# Patient Record
Sex: MALE | Race: WHITE | NOT HISPANIC OR LATINO | Employment: OTHER | RURAL
[De-identification: names, ages, dates, MRNs, and addresses within clinical notes are randomized per-mention and may not be internally consistent; named-entity substitution may affect disease eponyms.]

---

## 2019-09-05 ENCOUNTER — OFFICE VISIT (OUTPATIENT)
Dept: FAMILY MEDICINE CLINIC | Facility: CLINIC | Age: 59
End: 2019-09-05

## 2019-09-05 VITALS
HEIGHT: 72 IN | BODY MASS INDEX: 27.32 KG/M2 | SYSTOLIC BLOOD PRESSURE: 118 MMHG | OXYGEN SATURATION: 99 % | WEIGHT: 201.7 LBS | TEMPERATURE: 98 F | DIASTOLIC BLOOD PRESSURE: 60 MMHG | HEART RATE: 53 BPM

## 2019-09-05 DIAGNOSIS — E78.5 HYPERLIPIDEMIA, UNSPECIFIED HYPERLIPIDEMIA TYPE: ICD-10-CM

## 2019-09-05 DIAGNOSIS — Z87.19 HISTORY OF INGUINAL HERNIA: ICD-10-CM

## 2019-09-05 DIAGNOSIS — I10 ESSENTIAL HYPERTENSION: ICD-10-CM

## 2019-09-05 DIAGNOSIS — Z76.89 ENCOUNTER TO ESTABLISH CARE WITH NEW DOCTOR: ICD-10-CM

## 2019-09-05 DIAGNOSIS — Z90.79 H/O PROSTATECTOMY: ICD-10-CM

## 2019-09-05 DIAGNOSIS — C61 PROSTATE CANCER (HCC): ICD-10-CM

## 2019-09-05 DIAGNOSIS — G47.00 INSOMNIA, UNSPECIFIED TYPE: ICD-10-CM

## 2019-09-05 PROCEDURE — 99204 OFFICE O/P NEW MOD 45 MIN: CPT | Performed by: FAMILY MEDICINE

## 2019-09-05 RX ORDER — GABAPENTIN 300 MG/1
300 CAPSULE ORAL
Refills: 5 | COMMUNITY
Start: 2019-08-19 | End: 2019-10-30 | Stop reason: SDUPTHER

## 2019-09-05 RX ORDER — IPRATROPIUM BROMIDE 21 UG/1
SPRAY, METERED NASAL
Refills: 4 | COMMUNITY
Start: 2019-07-16 | End: 2020-04-15 | Stop reason: SDUPTHER

## 2019-09-05 RX ORDER — RAMIPRIL 10 MG/1
CAPSULE ORAL
COMMUNITY
Start: 2018-06-29 | End: 2019-10-30 | Stop reason: SDUPTHER

## 2019-09-05 RX ORDER — ATORVASTATIN CALCIUM 20 MG/1
1 TABLET, FILM COATED ORAL EVERY OTHER DAY
COMMUNITY
Start: 2018-06-29 | End: 2019-10-30 | Stop reason: SDUPTHER

## 2019-09-05 RX ORDER — MONTELUKAST SODIUM 10 MG/1
TABLET ORAL
COMMUNITY
Start: 2018-06-29 | End: 2019-10-30 | Stop reason: SDUPTHER

## 2019-09-05 NOTE — PROGRESS NOTES
Subjective Pt of Dr. Loyd. Practice has closed.    Guevara Meza is a 59 y.o. overweight white male nonsmoker with HTN, Allergies, [employer] (Hearing aides), H/O Prostate cancer S/P Prostatectomy at [facility]. See PMH/PSH. Presents to [facility] with new PCP.  ' Need Checkup, management of problems as discussed'.    Hypertension   This is a chronic problem. The current episode started more than 1 year ago. The problem is controlled. Associated symptoms include headaches. Pertinent negatives include no chest pain, neck pain, palpitations or shortness of breath (Cancer). Agents associated with hypertension include decongestants and NSAIDs. Risk factors for coronary artery disease include male gender and dyslipidemia. Past treatments include ACE inhibitors. Current antihypertension treatment includes ACE inhibitors. The current treatment provides significant improvement.   Cancer   This is a chronic (prostate cancer, S/P Prostatectomy) problem. The current episode started more than 1 year ago. The problem occurs daily. The problem has been gradually improving. Associated symptoms include arthralgias, fatigue and headaches. Pertinent negatives include no abdominal pain, chest pain, chills, congestion, coughing, diaphoresis, fever, joint swelling, myalgias, nausea, neck pain, numbness, rash, sore throat, vomiting or weakness. Nothing aggravates the symptoms. Treatments tried: Surgery. The treatment provided significant relief.   Sinus Problem   This is a chronic (Allergies, Rhinorrhea.) problem. The current episode started more than 1 year ago. The problem has been waxing and waning since onset. His pain is at a severity of 0/10. He is experiencing no pain. Associated symptoms include headaches. Pertinent negatives include no chills, congestion, coughing, diaphoresis, ear pain, neck pain, shortness of breath (Cancer), sinus pressure, sneezing or sore throat. Past treatments include spray decongestants and saline sprays  (Singulair, Benadryl, Atrovent nasal.). The treatment provided moderate relief.   Hyperlipidemia   This is a chronic problem. The current episode started more than 1 year ago. Recent lipid tests were reviewed and are variable. Pertinent negatives include no chest pain, myalgias or shortness of breath (Cancer). Current antihyperlipidemic treatment includes statins. The current treatment provides significant improvement of lipids. Risk factors for coronary artery disease include dyslipidemia, hypertension and male sex.   Pain   This is a chronic (Foot, Back, Shoulder) problem. Associated symptoms include arthralgias, fatigue and headaches. Pertinent negatives include no abdominal pain, chest pain, chills, congestion, coughing, diaphoresis, fever, joint swelling, myalgias, nausea, neck pain, numbness, rash, sore throat, vomiting or weakness. The symptoms are aggravated by standing, walking and exertion. Treatments tried: Neurontin. The treatment provided moderate relief.   Insomnia   This is a chronic problem. The current episode started more than 1 year ago. The problem occurs intermittently. Associated symptoms include arthralgias, fatigue and headaches. Pertinent negatives include no abdominal pain, chest pain, chills, congestion, coughing, diaphoresis, fever, joint swelling, myalgias, nausea, neck pain, numbness, rash, sore throat, vomiting or weakness. Treatments tried: Benadryl, Neurontin. The treatment provided moderate relief.        The following portions of the patient's history were reviewed and updated as appropriate: allergies, current medications, past family history, past medical history, past social history, past surgical history and problem list.    Review of Systems   Constitutional: Positive for fatigue. Negative for activity change, appetite change, chills, diaphoresis, fever and unexpected weight change.   HENT: Positive for hearing loss, postnasal drip, rhinorrhea and tinnitus. Negative for  congestion, dental problem, drooling, ear discharge, ear pain, facial swelling, mouth sores, nosebleeds, sinus pressure, sinus pain, sneezing, sore throat, trouble swallowing and voice change.    Eyes: Positive for itching. Negative for photophobia, pain, discharge, redness and visual disturbance.        Wears glasses     Eye exams with Dr. Glaser   Respiratory: Negative for apnea, cough, choking, chest tightness, shortness of breath (Cancer), wheezing and stridor.    Cardiovascular: Negative for chest pain, palpitations and leg swelling.   Gastrointestinal: Negative for abdominal distention, abdominal pain, anal bleeding, blood in stool, constipation, diarrhea, nausea, rectal pain and vomiting.        Rectal stenosis   Endocrine: Negative for cold intolerance, heat intolerance, polydipsia, polyphagia and polyuria.   Genitourinary: Positive for enuresis and hematuria. Negative for decreased urine volume, difficulty urinating, discharge, dysuria, flank pain, frequency, genital sores, penile pain, penile swelling, scrotal swelling, testicular pain and urgency.        Small R hernia felt in scrotum at times, told OK    Musculoskeletal: Positive for arthralgias and back pain. Negative for gait problem, joint swelling, myalgias, neck pain and neck stiffness.        R foot, Plantar faciitis.  R calf pain.     L5 DDD  Occasional back pain more on R    R shoulder pain   Skin: Negative for color change, pallor, rash and wound.        Eczema   Allergic/Immunologic: Positive for environmental allergies. Negative for food allergies and immunocompromised state.   Neurological: Positive for tremors and headaches. Negative for dizziness, seizures, syncope, facial asymmetry, speech difficulty, weakness, light-headedness and numbness.   Hematological: Negative for adenopathy. Bruises/bleeds easily.   Psychiatric/Behavioral: Positive for sleep disturbance. Negative for agitation, behavioral problems, confusion, decreased  concentration, dysphoric mood, hallucinations, self-injury and suicidal ideas. The patient has insomnia. The patient is not nervous/anxious and is not hyperactive.         Neurotin helps with sleep,      Sleeps 5 hrs with med.        Objective   Physical Exam   Constitutional: He is oriented to person, place, and time. He appears well-developed and well-nourished. No distress.   HENT:   Head: Normocephalic.   Right Ear: External ear normal.   Left Ear: External ear normal.   Nose: Nose normal.   Mouth/Throat: Oropharynx is clear and moist. No oropharyngeal exudate.   Eyes: Conjunctivae and EOM are normal. Pupils are equal, round, and reactive to light. Right eye exhibits no discharge. Left eye exhibits no discharge. No scleral icterus.   Neck: Normal range of motion. Neck supple. No JVD present. No tracheal deviation present. No thyromegaly present.   Cardiovascular: Normal rate, normal heart sounds and intact distal pulses. Exam reveals no gallop and no friction rub.   No murmur heard.  Pulmonary/Chest: Effort normal and breath sounds normal. No stridor. No respiratory distress. He has no wheezes. He has no rales. He exhibits no tenderness.   Abdominal: Soft. Bowel sounds are normal. He exhibits no distension and no mass. There is no tenderness. There is no rebound and no guarding. No hernia.   Musculoskeletal: Normal range of motion. He exhibits no edema, tenderness or deformity.   R shoulder pain   Lymphadenopathy:     He has no cervical adenopathy.   Neurological: He is alert and oriented to person, place, and time. He has normal reflexes. He displays normal reflexes. No cranial nerve deficit or sensory deficit. He exhibits normal muscle tone. Coordination normal.   Skin: Skin is warm and dry. Capillary refill takes 2 to 3 seconds. No rash noted. He is not diaphoretic. No erythema. No pallor.   Psychiatric: He has a normal mood and affect. His behavior is normal. Judgment and thought content normal.   Nursing  note and vitals reviewed.      Assessment/Plan   Guevara was seen today for establish care.    Diagnoses and all orders for this visit:    Encounter to establish care with new doctor    Prostate cancer (CMS/[facility])    H/O prostatectomy    Essential hypertension    Hyperlipidemia, unspecified hyperlipidemia type    History of inguinal hernia    Insomnia, unspecified type    Discussed exam, health problems, meds, tx plan, rationale. Discussed USPSTF recommendations. Discussed F/U with specialist. Discussed F/U here.          This document has been electronically signed by Rd Pabon MD

## 2019-09-08 PROBLEM — I10 ESSENTIAL HYPERTENSION: Status: ACTIVE | Noted: 2019-09-08

## 2019-09-08 PROBLEM — Z87.19 HISTORY OF INGUINAL HERNIA: Status: ACTIVE | Noted: 2019-09-08

## 2019-09-08 PROBLEM — E78.5 HYPERLIPIDEMIA: Status: ACTIVE | Noted: 2019-09-08

## 2019-09-08 PROBLEM — Z90.79 H/O PROSTATECTOMY: Status: ACTIVE | Noted: 2019-09-08

## 2019-09-08 PROBLEM — Z76.89 ENCOUNTER TO ESTABLISH CARE WITH NEW DOCTOR: Status: ACTIVE | Noted: 2019-09-08

## 2019-09-08 PROBLEM — C61 PROSTATE CANCER: Status: ACTIVE | Noted: 2019-09-08

## 2019-10-16 ENCOUNTER — LAB (OUTPATIENT)
Dept: LAB | Facility: HOSPITAL | Age: 59
End: 2019-10-16

## 2019-10-16 ENCOUNTER — OFFICE VISIT (OUTPATIENT)
Dept: FAMILY MEDICINE CLINIC | Facility: CLINIC | Age: 59
End: 2019-10-16

## 2019-10-16 VITALS
OXYGEN SATURATION: 98 % | HEIGHT: 72 IN | DIASTOLIC BLOOD PRESSURE: 96 MMHG | TEMPERATURE: 97.9 F | RESPIRATION RATE: 18 BRPM | SYSTOLIC BLOOD PRESSURE: 158 MMHG | HEART RATE: 53 BPM | WEIGHT: 205.25 LBS | BODY MASS INDEX: 27.8 KG/M2

## 2019-10-16 DIAGNOSIS — R25.2 LEG CRAMPS: ICD-10-CM

## 2019-10-16 DIAGNOSIS — M79.604 RIGHT LEG PAIN: Primary | ICD-10-CM

## 2019-10-16 DIAGNOSIS — Z79.899 HIGH RISK MEDICATION USE: ICD-10-CM

## 2019-10-16 DIAGNOSIS — I10 ESSENTIAL HYPERTENSION: ICD-10-CM

## 2019-10-16 DIAGNOSIS — I83.93 VARICOSE VEINS OF BOTH LOWER EXTREMITIES, UNSPECIFIED WHETHER COMPLICATED: ICD-10-CM

## 2019-10-16 DIAGNOSIS — Z00.00 ROUTINE MEDICAL EXAM: ICD-10-CM

## 2019-10-16 PROCEDURE — 80048 BASIC METABOLIC PNL TOTAL CA: CPT | Performed by: NURSE PRACTITIONER

## 2019-10-16 PROCEDURE — 85379 FIBRIN DEGRADATION QUANT: CPT | Performed by: NURSE PRACTITIONER

## 2019-10-16 PROCEDURE — 83735 ASSAY OF MAGNESIUM: CPT | Performed by: NURSE PRACTITIONER

## 2019-10-16 PROCEDURE — 99213 OFFICE O/P EST LOW 20 MIN: CPT | Performed by: NURSE PRACTITIONER

## 2019-10-16 PROCEDURE — 85025 COMPLETE CBC W/AUTO DIFF WBC: CPT | Performed by: NURSE PRACTITIONER

## 2019-10-16 NOTE — PROGRESS NOTES
"Subjective   Guevara Meza is a 59 y.o. male.     [facility] Visit    PCP: Rd Pabon MD    CC: \"right leg pain, cramps\"    History of varicose veins in bilateral lower legs, has support socks, but hasn't been wearing regularly.  Denies history of blood clots, recent travel.  Non smoker.     Does report he took a cialis and some cold meds yesterday and blood pressure is up a little today.  Has not taken Altace yet today.      Is on a statin for hyperlipidemia, but has been on for awhile without symptoms of leg cramps and has had no recent increase in dosage.       Leg Pain    Incident onset: started with right foot pain/cramping about a week ago, but the last few days it feels like it is traveling up his leg and now is in his right thigh--he is concerned about a blood clot. The incident occurred at home. There was no injury mechanism. The pain is present in the right foot and right leg. The quality of the pain is described as cramping. The pain is moderate. The pain has been fluctuating since onset. Pertinent negatives include no inability to bear weight, loss of motion, loss of sensation, muscle weakness, numbness or tingling. Nothing aggravates the symptoms. Treatments tried: he thought it may be from his sciatica, sees chiropractor for this, but pain feels different.        The following portions of the patient's history were reviewed and updated as appropriate: allergies, current medications, past medical history, past social history, past surgical history and problem list.    Review of Systems   Constitutional: Negative.    HENT: Positive for congestion. Negative for ear discharge, ear pain, postnasal drip, rhinorrhea, sinus pressure, sneezing and swollen glands.    Eyes: Negative.    Respiratory: Negative.    Cardiovascular: Negative.         Varicosities lower legs     Gastrointestinal: Negative.    Genitourinary: Positive for urinary incontinence ( since prostatectomy in Feb 2019). " "  Musculoskeletal: Positive for back pain.   Skin: Negative for rash.   Neurological: Negative for dizziness, tingling, numbness and headache.     /96 (BP Location: Right arm, Patient Position: Sitting, Cuff Size: Large Adult)   Pulse 53   Temp 97.9 °F (36.6 °C) (Oral)   Resp 18   Ht 182.9 cm (72\")   Wt 93.1 kg (205 lb 4 oz)   SpO2 98%   BMI 27.84 kg/m²     Objective   Physical Exam   Constitutional: He is oriented to person, place, and time. He appears well-developed and well-nourished. No distress.   Cardiovascular: Normal rate and regular rhythm.   Pulmonary/Chest: Effort normal and breath sounds normal.   Musculoskeletal:        Right lower leg: He exhibits no tenderness, no bony tenderness, no swelling and no edema.        Legs:  Negative Hansel's sign     Neurological: He is alert and oriented to person, place, and time.   Skin: Skin is warm and dry. No erythema.   Nursing note and vitals reviewed.    No results found for this or any previous visit (from the past 24 hour(s)).  No Images in the past 120 days found..      Assessment/Plan   Guevara was seen today for leg pain.    Diagnoses and all orders for this visit:    Right leg pain  -     CBC Auto Differential  -     D-dimer, Quantitative  -     Duplex Venous Lower Extremity - Right CAR; Future    Leg cramps  -     Basic metabolic panel  -     Magnesium  -     CBC Auto Differential  -     D-dimer, Quantitative    Varicose veins of both lower extremities, unspecified whether complicated  -     CBC Auto Differential  -     D-dimer, Quantitative  -     Duplex Venous Lower Extremity - Right CAR; Future    Essential hypertension      Labs, US as above.  Will follow with results when available.   Recommended he resume support socks/stockings for now    Take b/p meds as directed daily. Avoid OTC cold meds.  May use Coricidan OTC if needed.     See PCP or RTC if symptoms persist/worsen  See PCP for routine f/u visit and management of chronic medical " conditions

## 2019-10-17 LAB
ANION GAP SERPL CALCULATED.3IONS-SCNC: 8.4 MMOL/L (ref 5–15)
BASOPHILS # BLD AUTO: 0.06 10*3/MM3 (ref 0–0.2)
BASOPHILS NFR BLD AUTO: 1.1 % (ref 0–1.5)
BUN BLD-MCNC: 12 MG/DL (ref 8–23)
BUN/CREAT SERPL: 12.4 (ref 7–25)
CALCIUM SPEC-SCNC: 9.1 MG/DL (ref 8.6–10.5)
CHLORIDE SERPL-SCNC: 102 MMOL/L (ref 98–107)
CO2 SERPL-SCNC: 31.6 MMOL/L (ref 22–29)
CREAT BLD-MCNC: 0.97 MG/DL (ref 0.76–1.27)
D-DIMER, QUANTITATIVE (MAD,POW, STR): 323 NG/ML (FEU) (ref 0–470)
DEPRECATED RDW RBC AUTO: 40.7 FL (ref 37–54)
EOSINOPHIL # BLD AUTO: 0.19 10*3/MM3 (ref 0–0.4)
EOSINOPHIL NFR BLD AUTO: 3.5 % (ref 0.3–6.2)
ERYTHROCYTE [DISTWIDTH] IN BLOOD BY AUTOMATED COUNT: 12.4 % (ref 12.3–15.4)
GFR SERPL CREATININE-BSD FRML MDRD: 79 ML/MIN/1.73
GLUCOSE BLD-MCNC: 143 MG/DL (ref 65–99)
HCT VFR BLD AUTO: 40.1 % (ref 37.5–51)
HGB BLD-MCNC: 13.6 G/DL (ref 13–17.7)
IMM GRANULOCYTES # BLD AUTO: 0.13 10*3/MM3 (ref 0–0.05)
IMM GRANULOCYTES NFR BLD AUTO: 2.4 % (ref 0–0.5)
LYMPHOCYTES # BLD AUTO: 0.89 10*3/MM3 (ref 0.7–3.1)
LYMPHOCYTES NFR BLD AUTO: 16.2 % (ref 19.6–45.3)
MAGNESIUM SERPL-MCNC: 2.4 MG/DL (ref 1.6–2.4)
MCH RBC QN AUTO: 31 PG (ref 26.6–33)
MCHC RBC AUTO-ENTMCNC: 33.9 G/DL (ref 31.5–35.7)
MCV RBC AUTO: 91.3 FL (ref 79–97)
MONOCYTES # BLD AUTO: 0.46 10*3/MM3 (ref 0.1–0.9)
MONOCYTES NFR BLD AUTO: 8.4 % (ref 5–12)
NEUTROPHILS # BLD AUTO: 3.76 10*3/MM3 (ref 1.7–7)
NEUTROPHILS NFR BLD AUTO: 68.4 % (ref 42.7–76)
NRBC BLD AUTO-RTO: 0 /100 WBC (ref 0–0.2)
PLATELET # BLD AUTO: 189 10*3/MM3 (ref 140–450)
PMV BLD AUTO: 11.3 FL (ref 6–12)
POTASSIUM BLD-SCNC: 4.2 MMOL/L (ref 3.5–5.2)
RBC # BLD AUTO: 4.39 10*6/MM3 (ref 4.14–5.8)
SODIUM BLD-SCNC: 142 MMOL/L (ref 136–145)
WBC NRBC COR # BLD: 5.49 10*3/MM3 (ref 3.4–10.8)

## 2019-10-18 ENCOUNTER — OFFICE VISIT (OUTPATIENT)
Dept: FAMILY MEDICINE CLINIC | Facility: CLINIC | Age: 59
End: 2019-10-18

## 2019-10-18 VITALS
TEMPERATURE: 98.6 F | BODY MASS INDEX: 28.31 KG/M2 | OXYGEN SATURATION: 98 % | HEIGHT: 72 IN | DIASTOLIC BLOOD PRESSURE: 88 MMHG | SYSTOLIC BLOOD PRESSURE: 144 MMHG | WEIGHT: 209 LBS | HEART RATE: 59 BPM

## 2019-10-18 DIAGNOSIS — Z90.79 H/O PROSTATECTOMY: ICD-10-CM

## 2019-10-18 DIAGNOSIS — Z00.00 ROUTINE MEDICAL EXAM: ICD-10-CM

## 2019-10-18 DIAGNOSIS — Z79.899 HIGH RISK MEDICATION USE: ICD-10-CM

## 2019-10-18 DIAGNOSIS — I10 ESSENTIAL HYPERTENSION: ICD-10-CM

## 2019-10-18 DIAGNOSIS — Z87.19 HISTORY OF INGUINAL HERNIA: ICD-10-CM

## 2019-10-18 DIAGNOSIS — M54.31 SCIATICA OF RIGHT SIDE: ICD-10-CM

## 2019-10-18 DIAGNOSIS — I83.93 VARICOSE VEINS OF BOTH LOWER EXTREMITIES, UNSPECIFIED WHETHER COMPLICATED: ICD-10-CM

## 2019-10-18 DIAGNOSIS — R25.2 LEG CRAMPS: ICD-10-CM

## 2019-10-18 LAB
AMPHET+METHAMPHET UR QL: NEGATIVE
AMPHETAMINES UR QL: NEGATIVE
BARBITURATES UR QL SCN: NEGATIVE
BENZODIAZ UR QL SCN: NEGATIVE
BILIRUB UR QL STRIP: NEGATIVE
BUPRENORPHINE SERPL-MCNC: NEGATIVE NG/ML
CANNABINOIDS SERPL QL: NEGATIVE
CHOLEST SERPL-MCNC: 144 MG/DL (ref 0–200)
CLARITY UR: CLEAR
COCAINE UR QL: NEGATIVE
COLOR UR: YELLOW
GLUCOSE UR STRIP-MCNC: NEGATIVE MG/DL
HDLC SERPL-MCNC: 53 MG/DL (ref 40–60)
HGB UR QL STRIP.AUTO: NEGATIVE
KETONES UR QL STRIP: NEGATIVE
LDLC SERPL CALC-MCNC: 82 MG/DL (ref 0–100)
LDLC/HDLC SERPL: 1.54 {RATIO}
LEUKOCYTE ESTERASE UR QL STRIP.AUTO: NEGATIVE
METHADONE UR QL SCN: NEGATIVE
NITRITE UR QL STRIP: NEGATIVE
OPIATES UR QL: NEGATIVE
OXYCODONE UR QL SCN: NEGATIVE
PCP UR QL SCN: NEGATIVE
PH UR STRIP.AUTO: 7 [PH] (ref 5–8)
PROPOXYPH UR QL: NEGATIVE
PROT UR QL STRIP: NEGATIVE
SP GR UR STRIP: 1.01 (ref 1–1.03)
TRICYCLICS UR QL SCN: NEGATIVE
TRIGL SERPL-MCNC: 46 MG/DL (ref 0–150)
TSH SERPL DL<=0.05 MIU/L-ACNC: 3.27 UIU/ML (ref 0.27–4.2)
UROBILINOGEN UR QL STRIP: NORMAL
VLDLC SERPL-MCNC: 9.2 MG/DL (ref 5–40)

## 2019-10-18 PROCEDURE — 80307 DRUG TEST PRSMV CHEM ANLYZR: CPT

## 2019-10-18 PROCEDURE — 80061 LIPID PANEL: CPT

## 2019-10-18 PROCEDURE — 84443 ASSAY THYROID STIM HORMONE: CPT

## 2019-10-18 PROCEDURE — 82672 ASSAY OF ESTROGEN: CPT

## 2019-10-18 PROCEDURE — 99213 OFFICE O/P EST LOW 20 MIN: CPT | Performed by: FAMILY MEDICINE

## 2019-10-18 PROCEDURE — 81003 URINALYSIS AUTO W/O SCOPE: CPT

## 2019-10-18 NOTE — PROGRESS NOTES
Subjective   ohn Maria E Meza is a 59 y.o. overweight white male nonsmoker with HTN, Allergies, [employer] (Hearing aides), H/O Prostate cancer S/P Prostatectomy at [place]. See PMH/PSH. Presents for exam, to discuss health problems, Tx and F/U plans.    '  Remains incontinent of urine after Prostatectomy, using absorbant UW, smells like ammonia  after a few hrs. ED unresolved since surgery, was given Cialis not effective, trying a pump. Back pain has flared up, going down R leg. B/p has been OK. Allergies tolerable on meds'      Hypertension   This is a chronic problem. The current episode started more than 1 year ago. The problem is controlled. Associated symptoms include headaches. Pertinent negatives include no chest pain, neck pain, palpitations or shortness of breath (Cancer). Agents associated with hypertension include decongestants and NSAIDs. Risk factors for coronary artery disease include male gender and dyslipidemia. Past treatments include ACE inhibitors. Current antihypertension treatment includes ACE inhibitors. The current treatment provides significant improvement.   Cancer   This is a chronic (prostate cancer, S/P Prostatectomy) problem. The current episode started more than 1 year ago. The problem occurs daily. The problem has been gradually improving. Associated symptoms include arthralgias, fatigue and headaches. Pertinent negatives include no abdominal pain, chest pain, chills, congestion, coughing, diaphoresis, fever, joint swelling, myalgias, nausea, neck pain, numbness, rash, sore throat, vomiting or weakness. Nothing aggravates the symptoms. Treatments tried: Surgery. The treatment provided significant relief.   Sinus Problem   This is a chronic (Allergies, Rhinorrhea.) problem. The current episode started more than 1 year ago. The problem has been waxing and waning since onset. His pain is at a severity of 0/10. He is experiencing no pain. Associated symptoms include headaches. Pertinent  negatives include no chills, congestion, coughing, diaphoresis, ear pain, neck pain, shortness of breath (Cancer), sinus pressure, sneezing or sore throat. Past treatments include spray decongestants and saline sprays (Singulair, Benadryl, Atrovent nasal.). The treatment provided moderate relief.   Hyperlipidemia   This is a chronic problem. The current episode started more than 1 year ago. Recent lipid tests were reviewed and are variable. Pertinent negatives include no chest pain, myalgias or shortness of breath (Cancer). Current antihyperlipidemic treatment includes statins. The current treatment provides significant improvement of lipids. Risk factors for coronary artery disease include dyslipidemia, hypertension and male sex.   Pain   This is a chronic (Foot, Back, Shoulder pain. Low back is flaring) problem. The current episode started more than 1 year ago. Associated symptoms include arthralgias, fatigue and headaches. Pertinent negatives include no abdominal pain, chest pain, chills, congestion, coughing, diaphoresis, fever, joint swelling, myalgias, nausea, neck pain, numbness, rash, sore throat, vomiting or weakness. The symptoms are aggravated by standing, walking and exertion. Treatments tried: Neurontin. The treatment provided moderate relief.   Insomnia   This is a chronic problem. The current episode started more than 1 year ago. The problem occurs intermittently. Associated symptoms include arthralgias, fatigue and headaches. Pertinent negatives include no abdominal pain, chest pain, chills, congestion, coughing, diaphoresis, fever, joint swelling, myalgias, nausea, neck pain, numbness, rash, sore throat, vomiting or weakness. Treatments tried: Benadryl, Neurontin. The treatment provided moderate relief.        The following portions of the patient's history were reviewed and updated as appropriate: allergies, current medications, past family history, past medical history, past social history, past  surgical history and problem list.    Review of Systems   Constitutional: Positive for fatigue. Negative for activity change, appetite change, chills, diaphoresis, fever and unexpected weight change.   HENT: Positive for hearing loss, postnasal drip, rhinorrhea and tinnitus. Negative for congestion, dental problem, drooling, ear discharge, ear pain, facial swelling, mouth sores, nosebleeds, sinus pressure, sinus pain, sneezing, sore throat, trouble swallowing and voice change.    Eyes: Positive for itching. Negative for photophobia, pain, discharge, redness and visual disturbance.        Wears glasses     Eye exams with Dr. Glaser   Respiratory: Negative for apnea, cough, choking, chest tightness, shortness of breath (Cancer), wheezing and stridor.    Cardiovascular: Negative for chest pain, palpitations and leg swelling.   Gastrointestinal: Negative for abdominal distention, abdominal pain, anal bleeding, blood in stool, constipation, diarrhea, nausea, rectal pain and vomiting.        Rectal stenosis   Endocrine: Negative for cold intolerance, heat intolerance, polydipsia, polyphagia and polyuria.   Genitourinary: Positive for enuresis and hematuria. Negative for decreased urine volume, difficulty urinating, discharge, dysuria, flank pain, frequency, genital sores, penile pain, penile swelling, scrotal swelling, testicular pain and urgency.        Small R hernia felt in scrotum at times, told OK    Musculoskeletal: Positive for arthralgias and back pain. Negative for gait problem, joint swelling, myalgias, neck pain and neck stiffness.        R foot, Plantar faciitis.  R calf pain.     L5 DDD  Occasional back pain more on R    R shoulder pain   Skin: Negative for color change, pallor, rash and wound.        Eczema   Allergic/Immunologic: Positive for environmental allergies. Negative for food allergies and immunocompromised state.   Neurological: Positive for tremors and headaches. Negative for dizziness, seizures,  syncope, facial asymmetry, speech difficulty, weakness, light-headedness and numbness.   Hematological: Negative for adenopathy. Bruises/bleeds easily.   Psychiatric/Behavioral: Positive for sleep disturbance. Negative for agitation, behavioral problems, confusion, decreased concentration, dysphoric mood, hallucinations, self-injury and suicidal ideas. The patient has insomnia. The patient is not nervous/anxious and is not hyperactive.         Neurotin helps with sleep,      Sleeps 5 hrs with med.        Objective   Physical Exam   Constitutional: He is oriented to person, place, and time. He appears well-developed and well-nourished. No distress.   HENT:   Head: Normocephalic.   Right Ear: External ear normal.   Left Ear: External ear normal.   Nose: Nose normal.   Mouth/Throat: Oropharynx is clear and moist. No oropharyngeal exudate.   Eyes: Conjunctivae and EOM are normal. Pupils are equal, round, and reactive to light. Right eye exhibits no discharge. Left eye exhibits no discharge. No scleral icterus.   Neck: Normal range of motion. Neck supple. No JVD present. No tracheal deviation present. No thyromegaly present.   Cardiovascular: Normal rate, normal heart sounds and intact distal pulses. Exam reveals no gallop and no friction rub.   No murmur heard.  Pulmonary/Chest: Effort normal and breath sounds normal. No stridor. No respiratory distress. He has no wheezes. He has no rales. He exhibits no tenderness.   Abdominal: Soft. Bowel sounds are normal. He exhibits no distension and no mass. There is no tenderness. There is no rebound and no guarding. No hernia.   Musculoskeletal: Normal range of motion. He exhibits no edema, tenderness or deformity.   R shoulder pain.  Has WB 4 with Sav SL and C/O   Reports pain goes R low back down R leg.    Lymphadenopathy:     He has no cervical adenopathy.   Neurological: He is alert and oriented to person, place, and time. He has normal reflexes. He displays normal reflexes.  No cranial nerve deficit or sensory deficit. He exhibits normal muscle tone. Coordination normal.   Skin: Skin is warm and dry. Capillary refill takes 2 to 3 seconds. No rash noted. He is not diaphoretic. No erythema. No pallor.   Psychiatric: He has a normal mood and affect. His behavior is normal. Judgment and thought content normal.   Nursing note and vitals reviewed.      Assessment/Plan   Guevara was seen today for hypertension, hyperlipidemia, insomnia, leg pain and muscle pain.    Diagnoses and all orders for this visit:    Sciatica of right side  -     XR Spine Lumbar 2 or 3 View (In Office)    High risk medication use  -     Urine Drug Screen - Urine, Clean Catch; Future    Routine medical exam  -     TSH; Future  -     Lipid Panel; Future  -     Urinalysis With Culture If Indicated -; Future  -     Estrogens, Total; Future    Essential hypertension    H/O prostatectomy    History of inguinal hernia    Leg cramps    Varicose veins of both lower extremities, unspecified whether complicated      Discussed exam, health problems, meds, indications, Tx plan, rationale. Discussed USPSTF recommendations. Discussed checking labs. Discussed F/U with specialist. Discussed F/U here.          This document has been electronically signed by Rd Pabon MD

## 2019-10-18 NOTE — PATIENT INSTRUCTIONS
Chronic Back Pain  When back pain lasts longer than 3 months, it is called chronic back pain. The cause of your back pain may not be known. Some common causes include:  · Wear and tear (degenerative disease) of the bones, ligaments, or disks in your back.  · Inflammation and stiffness in your back (arthritis).  People who have chronic back pain often go through certain periods in which the pain is more intense (flare-ups). Many people can learn to manage the pain with home care.  Follow these instructions at home:  Pay attention to any changes in your symptoms. Take these actions to help with your pain:  Activity    · Avoid bending and other activities that make the problem worse.  · Maintain a proper position when standing or sitting:  ? When standing, keep your upper back and neck straight, with your shoulders pulled back. Avoid slouching.  ? When sitting, keep your back straight and relax your shoulders. Do not round your shoulders or pull them backward.  · Do not sit or  one place for long periods of time.  · Take brief periods of rest throughout the day. This will reduce your pain. Resting in a lying or standing position is usually better than sitting to rest.  · When you are resting for longer periods, mix in some mild activity or stretching between periods of rest. This will help to prevent stiffness and pain.  · Get regular exercise. Ask your health care provider what activities are safe for you.  · Do not lift anything that is heavier than 10 lb (4.5 kg). Always use proper lifting technique, which includes:  ? Bending your knees.  ? Keeping the load close to your body.  ? Avoiding twisting.  · Sleep on a firm mattress in a comfortable position. Try lying on your side with your knees slightly bent. If you lie on your back, put a pillow under your knees.  Managing pain  · If directed, apply ice to the painful area. Your health care provider may recommend applying ice during the first 24-48 hours after  a flare-up begins.  ? Put ice in a plastic bag.  ? Place a towel between your skin and the bag.  ? Leave the ice on for 20 minutes, 2-3 times per day.  · If directed, apply heat to the affected area as often as told by your health care provider. Use the heat source that your health care provider recommends, such as a moist heat pack or a heating pad.  ? Place a towel between your skin and the heat source.  ? Leave the heat on for 20-30 minutes.  ? Remove the heat if your skin turns bright red. This is especially important if you are unable to feel pain, heat, or cold. You may have a greater risk of getting burned.  · Try soaking in a warm tub.  · Take over-the-counter and prescription medicines only as told by your health care provider.  · Keep all follow-up visits as told by your health care provider. This is important.  Contact a health care provider if:  · You have pain that is not relieved with rest or medicine.  Get help right away if:  · You have weakness or numbness in one or both of your legs or feet.  · You have trouble controlling your bladder or your bowels.  · You have nausea or vomiting.  · You have pain in your abdomen.  · You have shortness of breath or you faint.  This information is not intended to replace advice given to you by your health care provider. Make sure you discuss any questions you have with your health care provider.  Document Released: 01/25/2006 Document Revised: 06/27/2018 Document Reviewed: 06/27/2018  [employer] Interactive Interactive Patient Education © 2019 [employer]

## 2019-10-21 LAB — ESTROGEN SERPL-MCNC: 114 PG/ML (ref 40–115)

## 2019-10-22 ENCOUNTER — TELEPHONE (OUTPATIENT)
Dept: FAMILY MEDICINE CLINIC | Facility: CLINIC | Age: 59
End: 2019-10-22

## 2019-10-30 ENCOUNTER — OFFICE VISIT (OUTPATIENT)
Dept: FAMILY MEDICINE CLINIC | Facility: CLINIC | Age: 59
End: 2019-10-30

## 2019-10-30 VITALS
TEMPERATURE: 98.1 F | DIASTOLIC BLOOD PRESSURE: 90 MMHG | OXYGEN SATURATION: 99 % | SYSTOLIC BLOOD PRESSURE: 138 MMHG | WEIGHT: 210.5 LBS | HEIGHT: 72 IN | HEART RATE: 59 BPM | BODY MASS INDEX: 28.51 KG/M2

## 2019-10-30 DIAGNOSIS — Z90.79 H/O PROSTATECTOMY: ICD-10-CM

## 2019-10-30 DIAGNOSIS — M79.604 RIGHT LEG PAIN: ICD-10-CM

## 2019-10-30 DIAGNOSIS — I10 ESSENTIAL HYPERTENSION: ICD-10-CM

## 2019-10-30 DIAGNOSIS — E78.5 HYPERLIPIDEMIA, UNSPECIFIED HYPERLIPIDEMIA TYPE: ICD-10-CM

## 2019-10-30 DIAGNOSIS — C61 PROSTATE CANCER (HCC): ICD-10-CM

## 2019-10-30 DIAGNOSIS — R39.81 FUNCTIONAL URINARY INCONTINENCE: ICD-10-CM

## 2019-10-30 DIAGNOSIS — T78.40XA ALLERGIC DISORDER, INITIAL ENCOUNTER: ICD-10-CM

## 2019-10-30 DIAGNOSIS — N52.31 ERECTILE DYSFUNCTION AFTER RADICAL PROSTATECTOMY: ICD-10-CM

## 2019-10-30 DIAGNOSIS — Z79.899 HIGH RISK MEDICATION USE: ICD-10-CM

## 2019-10-30 PROCEDURE — 99214 OFFICE O/P EST MOD 30 MIN: CPT | Performed by: FAMILY MEDICINE

## 2019-10-30 RX ORDER — MONTELUKAST SODIUM 10 MG/1
10 TABLET ORAL NIGHTLY
Qty: 90 TABLET | Refills: 2 | Status: SHIPPED | OUTPATIENT
Start: 2019-10-30 | End: 2020-04-15 | Stop reason: SDUPTHER

## 2019-10-30 RX ORDER — GABAPENTIN 300 MG/1
300 CAPSULE ORAL 3 TIMES DAILY
Qty: 90 CAPSULE | Refills: 3 | Status: SHIPPED | OUTPATIENT
Start: 2019-10-30 | End: 2020-02-26 | Stop reason: SDUPTHER

## 2019-10-30 RX ORDER — ATORVASTATIN CALCIUM 20 MG/1
20 TABLET, FILM COATED ORAL EVERY OTHER DAY
Qty: 30 TABLET | Refills: 5 | Status: SHIPPED | OUTPATIENT
Start: 2019-10-30 | End: 2020-07-15 | Stop reason: SDUPTHER

## 2019-10-30 RX ORDER — RAMIPRIL 10 MG/1
10 CAPSULE ORAL DAILY
Qty: 90 CAPSULE | Refills: 1 | Status: SHIPPED | OUTPATIENT
Start: 2019-10-30 | End: 2020-02-07 | Stop reason: SDUPTHER

## 2019-11-02 PROBLEM — Z79.899 HIGH RISK MEDICATION USE: Status: ACTIVE | Noted: 2019-11-02

## 2019-11-02 PROBLEM — N52.31 ERECTILE DYSFUNCTION AFTER RADICAL PROSTATECTOMY: Status: ACTIVE | Noted: 2019-11-02

## 2019-11-02 PROBLEM — R39.81 FUNCTIONAL URINARY INCONTINENCE: Status: ACTIVE | Noted: 2019-11-02

## 2020-02-07 DIAGNOSIS — I10 ESSENTIAL HYPERTENSION: ICD-10-CM

## 2020-02-07 RX ORDER — RAMIPRIL 10 MG/1
10 CAPSULE ORAL DAILY
Qty: 90 CAPSULE | Refills: 1 | Status: SHIPPED | OUTPATIENT
Start: 2020-02-07 | End: 2020-04-15 | Stop reason: SDUPTHER

## 2020-02-26 ENCOUNTER — OFFICE VISIT (OUTPATIENT)
Dept: FAMILY MEDICINE CLINIC | Facility: CLINIC | Age: 60
End: 2020-02-26

## 2020-02-26 ENCOUNTER — LAB (OUTPATIENT)
Dept: LAB | Facility: HOSPITAL | Age: 60
End: 2020-02-26

## 2020-02-26 VITALS
DIASTOLIC BLOOD PRESSURE: 70 MMHG | BODY MASS INDEX: 28.86 KG/M2 | TEMPERATURE: 98.6 F | HEIGHT: 72 IN | HEART RATE: 66 BPM | SYSTOLIC BLOOD PRESSURE: 118 MMHG | OXYGEN SATURATION: 99 % | WEIGHT: 213.1 LBS

## 2020-02-26 DIAGNOSIS — M79.605 BILATERAL LEG AND FOOT PAIN: ICD-10-CM

## 2020-02-26 DIAGNOSIS — M79.604 BILATERAL LEG AND FOOT PAIN: ICD-10-CM

## 2020-02-26 DIAGNOSIS — M79.604 RIGHT LEG PAIN: ICD-10-CM

## 2020-02-26 DIAGNOSIS — M79.671 BILATERAL LEG AND FOOT PAIN: ICD-10-CM

## 2020-02-26 DIAGNOSIS — R39.81 FUNCTIONAL URINARY INCONTINENCE: ICD-10-CM

## 2020-02-26 DIAGNOSIS — M79.672 BILATERAL LEG AND FOOT PAIN: ICD-10-CM

## 2020-02-26 DIAGNOSIS — N52.31 ERECTILE DYSFUNCTION AFTER RADICAL PROSTATECTOMY: ICD-10-CM

## 2020-02-26 DIAGNOSIS — Z85.46 H/O PROSTATE CANCER: ICD-10-CM

## 2020-02-26 DIAGNOSIS — G47.09 OTHER INSOMNIA: ICD-10-CM

## 2020-02-26 DIAGNOSIS — J34.9 SINUS PROBLEM: ICD-10-CM

## 2020-02-26 DIAGNOSIS — I10 ESSENTIAL HYPERTENSION: ICD-10-CM

## 2020-02-26 LAB — PSA SERPL-MCNC: <0.014 NG/ML (ref 0–4)

## 2020-02-26 PROCEDURE — G0103 PSA SCREENING: HCPCS

## 2020-02-26 PROCEDURE — 99214 OFFICE O/P EST MOD 30 MIN: CPT | Performed by: FAMILY MEDICINE

## 2020-02-26 RX ORDER — GABAPENTIN 300 MG/1
300 CAPSULE ORAL 3 TIMES DAILY
Qty: 90 CAPSULE | Refills: 3 | Status: SHIPPED | OUTPATIENT
Start: 2020-02-26 | End: 2020-04-15 | Stop reason: SDUPTHER

## 2020-02-26 RX ORDER — CHLORPHENIRAMINE MALEATE 4 MG/1
4 TABLET ORAL EVERY 6 HOURS PRN
Qty: 90 TABLET | Refills: 2 | Status: SHIPPED | OUTPATIENT
Start: 2020-02-26 | End: 2020-04-15 | Stop reason: SDUPTHER

## 2020-02-26 RX ORDER — SILDENAFIL 100 MG/1
100 TABLET, FILM COATED ORAL DAILY PRN
Qty: 30 TABLET | Refills: 3 | Status: SHIPPED | OUTPATIENT
Start: 2020-02-26 | End: 2020-05-26

## 2020-02-26 RX ORDER — AZITHROMYCIN 250 MG/1
TABLET, FILM COATED ORAL
Qty: 6 TABLET | Refills: 0 | Status: SHIPPED | OUTPATIENT
Start: 2020-02-26 | End: 2020-04-01

## 2020-02-26 NOTE — PROGRESS NOTES
Subjective    Guevara Meza is a 59 y.o. overweight white male nonsmoker with HTN, Allergies, [place] (Hearing aides), H/O Prostate cancer S/P Prostatectomy at [place]. See PMH/PSH. Presents for exam, to discuss health problems, Tx and F/U plans.   Past visit  '  Remains incontinent of urine after Prostatectomy, using absorbant UW, smells like ammonia  after a few hrs. ED unresolved since surgery, was given Cialis not effective, trying a pump. Back pain has flared up, going down R leg. B/p has been OK. Allergies tolerable on meds'     Last visit   ' Back pain improving some after med. ED has not improved. Urinary incontinence has not improved, Urologist said will take time to tell. B/P has been OK. Had X-ray and Labs would like to go over results'.     Today  ' Have occasional bilateral  pectoral pains, no SOA, nausea, sweating. Have nodule on R thumb believe is a foreign body , been in for a while, have tried to dig out in past. B/P has been OK. Urinary incontinence has improved. ED has not improved. Told by Urologist, should have PSA's followed here at 3 months, then every 6 months'.     Hypertension   This is a chronic problem. The current episode started more than 1 year ago. The problem is controlled. Associated symptoms include headaches. Pertinent negatives include no chest pain, neck pain, palpitations or shortness of breath (Cancer). Agents associated with hypertension include decongestants and NSAIDs. Risk factors for coronary artery disease include male gender and dyslipidemia. Past treatments include ACE inhibitors. Current antihypertension treatment includes ACE inhibitors. The current treatment provides significant improvement.   Cancer   This is a chronic (prostate cancer, S/P Prostatectomy) problem. The current episode started more than 1 year ago. The problem occurs daily. The problem has been gradually improving. Associated symptoms include arthralgias, fatigue and headaches. Pertinent  negatives include no abdominal pain, chest pain, chills, congestion, coughing, diaphoresis, fever, joint swelling, myalgias, nausea, neck pain, numbness, rash, sore throat, vomiting or weakness. Nothing aggravates the symptoms. Treatments tried: Surgery. The treatment provided significant relief.   Hyperlipidemia   This is a chronic problem. The current episode started more than 1 year ago. Recent lipid tests were reviewed and are variable. Pertinent negatives include no chest pain, myalgias or shortness of breath (Cancer). Current antihyperlipidemic treatment includes statins. The current treatment provides significant improvement of lipids. Risk factors for coronary artery disease include dyslipidemia, hypertension and male sex.   Pain   This is a chronic (Foot, Back, Shoulder pain. Low back is flaring) problem. The current episode started more than 1 year ago. Associated symptoms include arthralgias, fatigue and headaches. Pertinent negatives include no abdominal pain, chest pain, chills, congestion, coughing, diaphoresis, fever, joint swelling, myalgias, nausea, neck pain, numbness, rash, sore throat, vomiting or weakness. The symptoms are aggravated by standing, walking and exertion. Treatments tried: Neurontin. The treatment provided moderate relief.   Insomnia   This is a chronic problem. The current episode started more than 1 year ago. The problem occurs intermittently. Associated symptoms include arthralgias, fatigue and headaches. Pertinent negatives include no abdominal pain, chest pain, chills, congestion, coughing, diaphoresis, fever, joint swelling, myalgias, nausea, neck pain, numbness, rash, sore throat, vomiting or weakness. Treatments tried: Benadryl, Neurontin. The treatment provided moderate relief.        The following portions of the patient's history were reviewed and updated as appropriate: allergies, current medications, past family history, past medical history, past social history, past  surgical history and problem list.    Review of Systems   Constitutional: Positive for fatigue. Negative for activity change, appetite change, chills, diaphoresis, fever and unexpected weight change.   HENT: Positive for hearing loss and tinnitus. Negative for congestion, dental problem, drooling, ear discharge, ear pain, facial swelling, mouth sores, nosebleeds, postnasal drip, rhinorrhea, sinus pressure, sinus pain, sneezing, sore throat, trouble swallowing and voice change.    Eyes: Positive for itching. Negative for photophobia, pain, discharge, redness and visual disturbance.        Wears glasses     Eye exams with Dr. Glaser   Respiratory: Negative for apnea, cough, choking, chest tightness, shortness of breath (Cancer), wheezing and stridor.    Cardiovascular: Negative for chest pain, palpitations and leg swelling.   Gastrointestinal: Negative for abdominal distention, abdominal pain, anal bleeding, blood in stool, constipation, diarrhea, nausea, rectal pain and vomiting.        Rectal stenosis   Endocrine: Negative for cold intolerance, heat intolerance, polydipsia, polyphagia and polyuria.   Genitourinary: Positive for enuresis and hematuria. Negative for decreased urine volume, difficulty urinating, discharge, dysuria, flank pain, frequency, genital sores, penile pain, penile swelling, scrotal swelling, testicular pain and urgency.        Small R hernia felt in scrotum at times, told OK    Musculoskeletal: Positive for arthralgias and back pain. Negative for gait problem, joint swelling, myalgias, neck pain and neck stiffness.        R foot, Plantar faciitis.  R calf pain.     L5 DDD  Occasional back pain more on R    R shoulder pain   Skin: Negative for color change, pallor, rash and wound.        Eczema   Allergic/Immunologic: Positive for environmental allergies. Negative for food allergies and immunocompromised state.   Neurological: Positive for tremors and headaches. Negative for dizziness, seizures,  syncope, facial asymmetry, speech difficulty, weakness, light-headedness and numbness.   Hematological: Negative for adenopathy. Bruises/bleeds easily.   Psychiatric/Behavioral: Positive for sleep disturbance. Negative for agitation, behavioral problems, confusion, decreased concentration, dysphoric mood, hallucinations, self-injury and suicidal ideas. The patient has insomnia. The patient is not nervous/anxious and is not hyperactive.         Neurotin helps with sleep,      Sleeps 5 hrs with med.        Objective   Physical Exam   Constitutional: He is oriented to person, place, and time. He appears well-developed and well-nourished. No distress.   HENT:   Head: Normocephalic.   Right Ear: External ear normal.   Left Ear: External ear normal.   Nose: Nose normal.   Mouth/Throat: Oropharynx is clear and moist. No oropharyngeal exudate.   Eyes: Pupils are equal, round, and reactive to light. Conjunctivae and EOM are normal. Right eye exhibits no discharge. Left eye exhibits no discharge. No scleral icterus.   Neck: Normal range of motion. Neck supple. No JVD present. No tracheal deviation present. No thyromegaly present.   Cardiovascular: Normal rate, normal heart sounds and intact distal pulses. Exam reveals no gallop and no friction rub.   No murmur heard.  Pulmonary/Chest: Effort normal and breath sounds normal. No stridor. No respiratory distress. He has no wheezes. He has no rales. He exhibits no tenderness.   Abdominal: Soft. Bowel sounds are normal. He exhibits no distension and no mass. There is no tenderness. There is no rebound and no guarding. No hernia.   Musculoskeletal: Normal range of motion. He exhibits no edema, tenderness or deformity.   R shoulder pain WB 4 with ROM  Has WB 2 with Sav SL and C/O   Reports pain goes R low back down R leg.    Lymphadenopathy:     He has no cervical adenopathy.   Neurological: He is alert and oriented to person, place, and time. He has normal reflexes. He displays  normal reflexes. No cranial nerve deficit or sensory deficit. He exhibits normal muscle tone. Coordination normal.   Skin: Skin is warm and dry. Capillary refill takes 2 to 3 seconds. No rash noted. He is not diaphoretic. No erythema. No pallor.   Psychiatric: He has a normal mood and affect. His behavior is normal. Judgment and thought content normal.   Nursing note and vitals reviewed.      Assessment/Plan   Guevara was seen today for hypertension, hyperlipidemia, insomnia, arm pain and sinusitis.    Diagnoses and all orders for this visit:    Right leg pain  -     gabapentin (NEURONTIN) 300 MG capsule; Take 1 capsule by mouth 3 (Three) Times a Day.    Essential hypertension    Sinus problem  Comments:  Z-pack, Chlor-trimeton  Orders:  -     azithromycin (ZITHROMAX) 250 MG tablet; Take 2 tablets the first day, then 1 tablet daily for 4 days.  -     chlorpheniramine (CHLOR-TRIMETON) 4 MG tablet; Take 1 tablet by mouth Every 6 (Six) Hours As Needed for Allergies.    H/O prostate cancer  Comments:  Check PSA  Orders:  -     PSA SCREENING; Future    Erectile dysfunction after radical prostatectomy    Functional urinary incontinence    Bilateral leg and foot pain  Comments:  Neurontin    Other insomnia  Comments:  Neurontin    Other orders  -     sildenafil (VIAGRA) 100 MG tablet; Take 1 tablet by mouth Daily As Needed for Erectile Dysfunction for up to 90 days.    Discussed exam, health problems, meds, indications, Tx plan, rationale. Discussed safety with controlled med. Discussed USPSTF recommendations. Discussed F/U with specialist. Discussed F/U here.        This document has been electronically signed by Rd Pabon MD

## 2020-02-29 PROBLEM — M79.605 BILATERAL LEG AND FOOT PAIN: Status: ACTIVE | Noted: 2020-02-29

## 2020-02-29 PROBLEM — M79.671 BILATERAL LEG AND FOOT PAIN: Status: ACTIVE | Noted: 2020-02-29

## 2020-02-29 PROBLEM — M79.604 BILATERAL LEG AND FOOT PAIN: Status: ACTIVE | Noted: 2020-02-29

## 2020-02-29 PROBLEM — G47.09 OTHER INSOMNIA: Status: ACTIVE | Noted: 2020-02-29

## 2020-02-29 PROBLEM — M79.672 BILATERAL LEG AND FOOT PAIN: Status: ACTIVE | Noted: 2020-02-29

## 2020-03-05 ENCOUNTER — TELEPHONE (OUTPATIENT)
Dept: FAMILY MEDICINE CLINIC | Facility: CLINIC | Age: 60
End: 2020-03-05

## 2020-04-01 ENCOUNTER — TELEPHONE (OUTPATIENT)
Dept: FAMILY MEDICINE CLINIC | Facility: CLINIC | Age: 60
End: 2020-04-01

## 2020-04-01 ENCOUNTER — OFFICE VISIT (OUTPATIENT)
Dept: FAMILY MEDICINE CLINIC | Facility: CLINIC | Age: 60
End: 2020-04-01

## 2020-04-01 DIAGNOSIS — J32.4 CHRONIC PANSINUSITIS: ICD-10-CM

## 2020-04-01 DIAGNOSIS — J02.9 SORE THROAT: ICD-10-CM

## 2020-04-01 PROCEDURE — 99442 PR PHYS/QHP TELEPHONE EVALUATION 11-20 MIN: CPT | Performed by: FAMILY MEDICINE

## 2020-04-01 RX ORDER — LEVOFLOXACIN 750 MG/1
750 TABLET ORAL DAILY
Qty: 7 TABLET | Refills: 0 | Status: SHIPPED | OUTPATIENT
Start: 2020-04-01 | End: 2020-04-15

## 2020-04-01 NOTE — PATIENT INSTRUCTIONS
Sinusitis, Adult  Sinusitis is soreness and swelling (inflammation) of your sinuses. Sinuses are hollow spaces in the bones around your face. They are located:  · Around your eyes.  · In the middle of your forehead.  · Behind your nose.  · In your cheekbones.  Your sinuses and nasal passages are lined with a fluid called mucus. Mucus drains out of your sinuses. Swelling can trap mucus in your sinuses. This lets germs (bacteria, virus, or fungus) grow, which leads to infection. Most of the time, this condition is caused by a virus.  What are the causes?  This condition is caused by:  · Allergies.  · Asthma.  · Germs.  · Things that block your nose or sinuses.  · Growths in the nose (nasal polyps).  · Chemicals or irritants in the air.  · Fungus (rare).  What increases the risk?  You are more likely to develop this condition if:  · You have a weak body defense system (immune system).  · You do a lot of swimming or diving.  · You use nasal sprays too much.  · You smoke.  What are the signs or symptoms?  The main symptoms of this condition are pain and a feeling of pressure around the sinuses. Other symptoms include:  · Stuffy nose (congestion).  · Runny nose (drainage).  · Swelling and warmth in the sinuses.  · Headache.  · Toothache.  · A cough that may get worse at night.  · Mucus that collects in the throat or the back of the nose (postnasal drip).  · Being unable to smell and taste.  · Being very tired (fatigue).  · A fever.  · Sore throat.  · Bad breath.  How is this diagnosed?  This condition is diagnosed based on:  · Your symptoms.  · Your medical history.  · A physical exam.  · Tests to find out if your condition is short-term (acute) or long-term (chronic). Your doctor may:  ? Check your nose for growths (polyps).  ? Check your sinuses using a tool that has a light (endoscope).  ? Check for allergies or germs.  ? Do imaging tests, such as an MRI or CT scan.  How is this treated?  Treatment for this condition  depends on the cause and whether it is short-term or long-term.  · If caused by a virus, your symptoms should go away on their own within 10 days. You may be given medicines to relieve symptoms. They include:  ? Medicines that shrink swollen tissue in the nose.  ? Medicines that treat allergies (antihistamines).  ? A spray that treats swelling of the nostrils.   ? Rinses that help get rid of thick mucus in your nose (nasal saline washes).  · If caused by bacteria, your doctor may wait to see if you will get better without treatment. You may be given antibiotic medicine if you have:  ? A very bad infection.  ? A weak body defense system.  · If caused by growths in the nose, you may need to have surgery.  Follow these instructions at home:  Medicines  · Take, use, or apply over-the-counter and prescription medicines only as told by your doctor. These may include nasal sprays.  · If you were prescribed an antibiotic medicine, take it as told by your doctor. Do not stop taking the antibiotic even if you start to feel better.  Hydrate and humidify    · Drink enough water to keep your pee (urine) pale yellow.  · Use a cool mist humidifier to keep the humidity level in your home above 50%.  · Breathe in steam for 10-15 minutes, 3-4 times a day, or as told by your doctor. You can do this in the bathroom while a hot shower is running.  · Try not to spend time in cool or dry air.  Rest  · Rest as much as you can.  · Sleep with your head raised (elevated).  · Make sure you get enough sleep each night.  General instructions    · Put a warm, moist washcloth on your face 3-4 times a day, or as often as told by your doctor. This will help with discomfort.  · Wash your hands often with soap and water. If there is no soap and water, use hand .  · Do not smoke. Avoid being around people who are smoking (secondhand smoke).  · Keep all follow-up visits as told by your doctor. This is important.  Contact a doctor if:  · You  have a fever.  · Your symptoms get worse.  · Your symptoms do not get better within 10 days.  Get help right away if:  · You have a very bad headache.  · You cannot stop throwing up (vomiting).  · You have very bad pain or swelling around your face or eyes.  · You have trouble seeing.  · You feel confused.  · Your neck is stiff.  · You have trouble breathing.  Summary  · Sinusitis is swelling of your sinuses. Sinuses are hollow spaces in the bones around your face.  · This condition is caused by tissues in your nose that become inflamed or swollen. This traps germs. These can lead to infection.  · If you were prescribed an antibiotic medicine, take it as told by your doctor. Do not stop taking it even if you start to feel better.  · Keep all follow-up visits as told by your doctor. This is important.  This information is not intended to replace advice given to you by your health care provider. Make sure you discuss any questions you have with your health care provider.  Document Released: 06/05/2009 Document Revised: 05/20/2019 Document Reviewed: 05/20/2019  [employer] Interactive Patient Education © 2020 [employer]

## 2020-04-01 NOTE — PROGRESS NOTES
Subjective    Guevara Meza is a 59 y.o. overweight white male nonsmoker with HTN, Allergies, [place] (Hearing aides), H/O Prostate cancer S/P Prostatectomy at [place]. See PMH/PSH. Calls to discuss acute health problems, Tx and F/U plans.    ' Sinusitis not improved drainage down throat, throat causing sourness, and some cough. No fever,  2wks. Yellow green drainage. Z-pack didn't help much. Chlortrimeton hasn't helped much'.    Sinusitis   This is a chronic problem. The current episode started 1 to 4 weeks ago. The problem has been waxing and waning since onset. There has been no fever. His pain is at a severity of 4/10. The pain is moderate. Associated symptoms include congestion, coughing, headaches, sinus pressure and a sore throat. Pertinent negatives include no chills, diaphoresis, ear pain, neck pain, shortness of breath (Cancer) or sneezing. Past treatments include antibiotics (Antihistamine). The treatment provided no relief.        The following portions of the patient's history were reviewed and updated as appropriate: allergies, current medications, past family history, past medical history, past social history, past surgical history and problem list.    Review of Systems   Constitutional: Negative for activity change, appetite change, chills, diaphoresis, fatigue, fever and unexpected weight change.   HENT: Positive for congestion, hearing loss, sinus pressure, sore throat and tinnitus. Negative for dental problem, drooling, ear discharge, ear pain, facial swelling, mouth sores, nosebleeds, postnasal drip, rhinorrhea, sinus pain, sneezing, trouble swallowing and voice change.    Eyes: Positive for itching. Negative for photophobia, pain, discharge, redness and visual disturbance.        Wears glasses     Eye exams with Dr. Glaser   Respiratory: Positive for cough. Negative for apnea, choking, chest tightness, shortness of breath (Cancer), wheezing and stridor.    Cardiovascular: Negative for chest  pain, palpitations and leg swelling.   Gastrointestinal: Negative for abdominal distention, abdominal pain, anal bleeding, blood in stool, constipation, diarrhea, nausea, rectal pain and vomiting.        Rectal stenosis   Endocrine: Negative for cold intolerance, heat intolerance, polydipsia, polyphagia and polyuria.   Genitourinary: Positive for enuresis. Negative for decreased urine volume, difficulty urinating, discharge, dysuria, flank pain, frequency, genital sores, hematuria, penile pain, penile swelling, scrotal swelling, testicular pain and urgency.        Small R hernia felt in scrotum at times, told OK    Musculoskeletal: Positive for arthralgias and back pain. Negative for gait problem, joint swelling, myalgias, neck pain and neck stiffness.        R foot, Plantar faciitis.  R calf pain.     L5 DDD  Occasional back pain more on R    R shoulder pain   Skin: Negative for color change, pallor, rash and wound.        Eczema   Allergic/Immunologic: Positive for environmental allergies. Negative for food allergies and immunocompromised state.   Neurological: Positive for tremors and headaches. Negative for dizziness, seizures, syncope, facial asymmetry, speech difficulty, weakness, light-headedness and numbness.   Hematological: Negative for adenopathy. Bruises/bleeds easily.   Psychiatric/Behavioral: Positive for sleep disturbance. Negative for agitation, behavioral problems, confusion, decreased concentration, dysphoric mood, hallucinations, self-injury and suicidal ideas. The patient is not nervous/anxious and is not hyperactive.         Neurotin helps with sleep,      Sleeps 5 hrs with med.        Objective   Physical Exam   Constitutional: No distress.   Pulmonary/Chest: Effort normal.   Skin: He is not diaphoretic.   Psychiatric: He has a normal mood and affect. His behavior is normal. Judgment and thought content normal.       Assessment/Plan   Guevara was seen today for sore throat.    Diagnoses and all  orders for this visit:    Chronic pansinusitis  -     levoFLOXacin (Levaquin) 750 MG tablet; Take 1 tablet by mouth Daily.    Sore throat  -     levoFLOXacin (Levaquin) 750 MG tablet; Take 1 tablet by mouth Daily.    Discussed Health problem, Sinusitis, Meds, indication, Tx plan, Rationale. Discussed F/U plan.  This visit has been rescheduled as a phone visit to comply with patient safety concerns in accordance with CDC recommendations. Total time of discussion was 11 minutes.        This document has been electronically signed by Rd Pabon MD on April 1, 2020 16:37

## 2020-04-01 NOTE — TELEPHONE ENCOUNTER
PATIENT STATES HE BELIEVES HE HAS A SINUS INFECTION SYMPTOMS ARE RUNNY NOSE AND YELLOW MUCOUS AND IS REQUESTING SOMETHING FOR IT   PATIENT ALSO STATES HE DOES NOT WANT TO COME IN  FOR AN APPOINTMENT WITH COVID [address] [place] [address] Pharmacy [address] [place] [address] DRIVE - [phone] [facility] [phone] FX         PATIENTS BEST CONTACT NUMBER   [phone]

## 2020-04-15 ENCOUNTER — OFFICE VISIT (OUTPATIENT)
Dept: FAMILY MEDICINE CLINIC | Facility: CLINIC | Age: 60
End: 2020-04-15

## 2020-04-15 ENCOUNTER — TELEPHONE (OUTPATIENT)
Dept: FAMILY MEDICINE CLINIC | Facility: CLINIC | Age: 60
End: 2020-04-15

## 2020-04-15 ENCOUNTER — PRIOR AUTHORIZATION (OUTPATIENT)
Dept: FAMILY MEDICINE CLINIC | Facility: CLINIC | Age: 60
End: 2020-04-15

## 2020-04-15 DIAGNOSIS — Z85.46 H/O PROSTATE CANCER: ICD-10-CM

## 2020-04-15 DIAGNOSIS — J34.9 SINUS PROBLEM: ICD-10-CM

## 2020-04-15 DIAGNOSIS — M79.604 RIGHT LEG PAIN: ICD-10-CM

## 2020-04-15 DIAGNOSIS — M79.672 BILATERAL LEG AND FOOT PAIN: ICD-10-CM

## 2020-04-15 DIAGNOSIS — M79.605 BILATERAL LEG AND FOOT PAIN: ICD-10-CM

## 2020-04-15 DIAGNOSIS — I10 ESSENTIAL HYPERTENSION: ICD-10-CM

## 2020-04-15 DIAGNOSIS — J30.2 SEASONAL ALLERGIES: ICD-10-CM

## 2020-04-15 DIAGNOSIS — M79.671 BILATERAL LEG AND FOOT PAIN: ICD-10-CM

## 2020-04-15 DIAGNOSIS — Z90.79 H/O PROSTATECTOMY: ICD-10-CM

## 2020-04-15 DIAGNOSIS — M79.604 BILATERAL LEG AND FOOT PAIN: ICD-10-CM

## 2020-04-15 PROCEDURE — 99213 OFFICE O/P EST LOW 20 MIN: CPT | Performed by: FAMILY MEDICINE

## 2020-04-15 RX ORDER — MONTELUKAST SODIUM 10 MG/1
10 TABLET ORAL NIGHTLY
Qty: 90 TABLET | Refills: 2
Start: 2020-04-15 | End: 2020-08-14 | Stop reason: SDDI

## 2020-04-15 RX ORDER — CHLORPHENIRAMINE MALEATE 4 MG/1
4 TABLET ORAL EVERY 6 HOURS PRN
Qty: 90 TABLET | Refills: 2
Start: 2020-04-15 | End: 2020-06-03 | Stop reason: SDUPTHER

## 2020-04-15 RX ORDER — IPRATROPIUM BROMIDE 21 UG/1
1 SPRAY, METERED NASAL 2 TIMES DAILY
Qty: 1 EACH | Refills: 4 | Status: SHIPPED | OUTPATIENT
Start: 2020-04-15 | End: 2020-08-14 | Stop reason: SDDI

## 2020-04-15 RX ORDER — GABAPENTIN 300 MG/1
300 CAPSULE ORAL 3 TIMES DAILY
Qty: 90 CAPSULE | Refills: 3
Start: 2020-04-15 | End: 2020-06-03 | Stop reason: SDUPTHER

## 2020-04-15 RX ORDER — RAMIPRIL 10 MG/1
10 CAPSULE ORAL DAILY
Qty: 90 CAPSULE | Refills: 1
Start: 2020-04-15 | End: 2020-10-15 | Stop reason: SDUPTHER

## 2020-04-15 NOTE — PROGRESS NOTES
Subjective   Guevara Meza is a 59 y.o. overweight white male nonsmoker with HTN, Allergies, [place] (Hearing aides), H/O Prostate cancer S/P Prostatectomy at [facility]. See PMH/PSH. Pt Calls to discuss acute health problems, Tx and F/U plans.  You have chosen to receive care through a telephone visit. Do you consent to use a telephone visit for your medical care today? Yes      ' Sinusitis, sore throat resolved with Levaquin, but drainage has started back, taking Chlor-trimeton, helps some. Ran out of Nasal spray from specialist, need refill that helps with decreasing drainage'.  'HPI'  Sinusitis   This is a new problem. The current episode started 1 to 4 weeks ago. The problem has been gradually improving since onset. There has been no fever. His pain is at a severity of 1/10. The pain is mild. Associated symptoms include congestion, coughing, headaches, sinus pressure and a sore throat. Pertinent negatives include no chills, diaphoresis, ear pain, neck pain, shortness of breath (Cancer) or sneezing. Past treatments include antibiotics and acetaminophen (Antihistamine).   Sore Throat    The current episode started 1 to 4 weeks ago. The problem has been gradually improving. There has been no fever. The pain is at a severity of 0/10. The patient is experiencing no pain. Associated symptoms include congestion, coughing and headaches. Pertinent negatives include no abdominal pain, diarrhea, drooling, ear discharge, ear pain, neck pain, shortness of breath (Cancer), stridor, trouble swallowing or vomiting. Associated symptoms comments: Sinus drainage. Treatments tried: Abx, Antihistamine, Atrovent nasal. The treatment provided moderate relief.   Hypertension   This is a chronic problem. The current episode started more than 1 year ago. The problem is controlled. Associated symptoms include headaches. Pertinent negatives include no chest pain, neck pain, palpitations or shortness of breath (Cancer). Agents associated  with hypertension include decongestants and NSAIDs. Risk factors for coronary artery disease include male gender and dyslipidemia. Past treatments include ACE inhibitors. Current antihypertension treatment includes ACE inhibitors. The current treatment provides significant improvement.   Cancer   This is a chronic (prostate cancer, S/P Prostatectomy) problem. The current episode started more than 1 year ago. The problem occurs daily. The problem has been gradually improving. Associated symptoms include arthralgias, congestion, coughing, headaches and a sore throat. Pertinent negatives include no abdominal pain, chest pain, chills, diaphoresis, fatigue, fever, joint swelling, myalgias, nausea, neck pain, numbness, rash, vomiting or weakness. Nothing aggravates the symptoms. Treatments tried: Surgery. The treatment provided significant relief.   Hyperlipidemia   This is a chronic problem. The current episode started more than 1 year ago. Recent lipid tests were reviewed and are variable. Pertinent negatives include no chest pain, myalgias or shortness of breath (Cancer). Current antihyperlipidemic treatment includes statins. The current treatment provides significant improvement of lipids. Risk factors for coronary artery disease include dyslipidemia, hypertension and male sex.   Pain   This is a chronic (Foot, Back, Shoulder pain. Low back is flaring) problem. The current episode started more than 1 year ago. Associated symptoms include arthralgias, congestion, coughing, headaches and a sore throat. Pertinent negatives include no abdominal pain, chest pain, chills, diaphoresis, fatigue, fever, joint swelling, myalgias, nausea, neck pain, numbness, rash, vomiting or weakness. The symptoms are aggravated by standing, walking and exertion. Treatments tried: Neurontin. The treatment provided moderate relief.   Insomnia   This is a chronic problem. The current episode started more than 1 year ago. The problem occurs  intermittently. Associated symptoms include arthralgias, congestion, coughing, headaches and a sore throat. Pertinent negatives include no abdominal pain, chest pain, chills, diaphoresis, fatigue, fever, joint swelling, myalgias, nausea, neck pain, numbness, rash, vomiting or weakness. Treatments tried: Benadryl, Neurontin. The treatment provided moderate relief.        The following portions of the patient's history were reviewed and updated as appropriate: allergies, current medications, past family history, past medical history, past social history, past surgical history and problem list.    Review of Systems   Constitutional: Negative for activity change, appetite change, chills, diaphoresis, fatigue, fever and unexpected weight change.   HENT: Positive for congestion, hearing loss, postnasal drip, sinus pressure, sore throat and tinnitus. Negative for dental problem, drooling, ear discharge, ear pain, facial swelling, mouth sores, nosebleeds, rhinorrhea, sinus pain, sneezing, trouble swallowing and voice change.    Eyes: Positive for itching. Negative for photophobia, pain, discharge, redness and visual disturbance.        Wears glasses     Eye exams with Dr. Glaser   Respiratory: Positive for cough. Negative for apnea, choking, chest tightness, shortness of breath (Cancer), wheezing and stridor.    Cardiovascular: Negative for chest pain, palpitations and leg swelling.   Gastrointestinal: Negative for abdominal distention, abdominal pain, anal bleeding, blood in stool, constipation, diarrhea, nausea, rectal pain and vomiting.        Rectal stenosis   Endocrine: Negative for cold intolerance, heat intolerance, polydipsia, polyphagia and polyuria.   Genitourinary: Positive for enuresis. Negative for decreased urine volume, difficulty urinating, discharge, dysuria, flank pain, frequency, genital sores, hematuria, penile pain, penile swelling, scrotal swelling, testicular pain and urgency.        Small R hernia  felt in scrotum at times, told OK    Musculoskeletal: Positive for arthralgias and back pain. Negative for gait problem, joint swelling, myalgias, neck pain and neck stiffness.        R foot, Plantar faciitis.  R calf pain.     L5 DDD  Occasional back pain more on R    R shoulder pain   Skin: Negative for color change, pallor, rash and wound.        Eczema   Allergic/Immunologic: Positive for environmental allergies. Negative for food allergies and immunocompromised state.   Neurological: Positive for tremors and headaches. Negative for dizziness, seizures, syncope, facial asymmetry, speech difficulty, weakness, light-headedness and numbness.   Hematological: Negative for adenopathy. Bruises/bleeds easily.   Psychiatric/Behavioral: Positive for sleep disturbance. Negative for agitation, behavioral problems, confusion, decreased concentration, dysphoric mood, hallucinations, self-injury and suicidal ideas. The patient has insomnia. The patient is not nervous/anxious and is not hyperactive.         Neurotin helps with sleep,      Sleeps 5 hrs with med.        Objective   Physical Exam   Constitutional: He is oriented to person, place, and time. No distress.   Pulmonary/Chest: Effort normal.   Neurological: He is alert and oriented to person, place, and time.   Psychiatric: He has a normal mood and affect. His behavior is normal. Judgment and thought content normal.   Nursing note reviewed.      Assessment/Plan   Guevara was seen today for sinusitis and sore throat.    Diagnoses and all orders for this visit:    Sinus problem  -     ipratropium (ATROVENT) 0.03 % nasal spray; 1 spray into the nostril(s) as directed by provider 2 (Two) Times a Day.  -     montelukast (SINGULAIR) 10 MG tablet; Take 1 tablet by mouth Every Night.  -     chlorpheniramine (Chlor-Trimeton) 4 MG tablet; Take 1 tablet by mouth Every 6 (Six) Hours As Needed for Allergies.    Essential hypertension  -     ramipril (ALTACE) 10 MG capsule; Take 1  capsule by mouth Daily.    Seasonal allergies  -     ipratropium (ATROVENT) 0.03 % nasal spray; 1 spray into the nostril(s) as directed by provider 2 (Two) Times a Day.  -     montelukast (SINGULAIR) 10 MG tablet; Take 1 tablet by mouth Every Night.  -     chlorpheniramine (Chlor-Trimeton) 4 MG tablet; Take 1 tablet by mouth Every 6 (Six) Hours As Needed for Allergies.    H/O prostate cancer    H/O prostatectomy    Bilateral leg and foot pain  -     gabapentin (NEURONTIN) 300 MG capsule; Take 1 capsule by mouth 3 (Three) Times a Day.    Right leg pain  -     gabapentin (NEURONTIN) 300 MG capsule; Take 1 capsule by mouth 3 (Three) Times a Day.    Sinus problem  Comments:  Z-pack, Chlor-trimeton  Orders:  -     ipratropium (ATROVENT) 0.03 % nasal spray; 1 spray into the nostril(s) as directed by provider 2 (Two) Times a Day.  -     montelukast (SINGULAIR) 10 MG tablet; Take 1 tablet by mouth Every Night.  -     chlorpheniramine (Chlor-Trimeton) 4 MG tablet; Take 1 tablet by mouth Every 6 (Six) Hours As Needed for Allergies.      Discussed Health problems, meds, indications, tx plan, rationale. Discussed caution with OTC sinus med with HTN. Discussed precautions to avoid exposure to Covid-19. Discussed F/u with specialist. Discussed F/U here  This visit has been rescheduled as a phone visit to comply with patient safety concerns in accordance with CDC recommendations. Total time of discussion was 15 minutes.        This document has been electronically signed by Rd Pabon MD on April 15, 2020 10:00

## 2020-04-15 NOTE — TELEPHONE ENCOUNTER
Insurance does not cover ipratropium nasal spray. PA attempted, denied. Pt must try and fail azelastine, dymista, flunisolide, fluticasone, olopatadine, onmaris.per denial letter. Do not see any listed in chart or previous notes. Please advise.

## 2020-04-15 NOTE — TELEPHONE ENCOUNTER
He had tried Flonase , Nasonex, was put on Atrovent by HCP treating Allergies. He may have changed Ins since it was covered last.

## 2020-06-03 ENCOUNTER — OFFICE VISIT (OUTPATIENT)
Dept: FAMILY MEDICINE CLINIC | Facility: CLINIC | Age: 60
End: 2020-06-03

## 2020-06-03 DIAGNOSIS — I10 ESSENTIAL HYPERTENSION: ICD-10-CM

## 2020-06-03 DIAGNOSIS — M79.604 RIGHT LEG PAIN: ICD-10-CM

## 2020-06-03 DIAGNOSIS — J34.9 SINUS PROBLEM: ICD-10-CM

## 2020-06-03 DIAGNOSIS — M79.604 BILATERAL LEG AND FOOT PAIN: ICD-10-CM

## 2020-06-03 DIAGNOSIS — J32.4 CHRONIC PANSINUSITIS: ICD-10-CM

## 2020-06-03 DIAGNOSIS — M79.605 BILATERAL LEG AND FOOT PAIN: ICD-10-CM

## 2020-06-03 DIAGNOSIS — M79.671 BILATERAL LEG AND FOOT PAIN: ICD-10-CM

## 2020-06-03 DIAGNOSIS — J30.2 SEASONAL ALLERGIES: ICD-10-CM

## 2020-06-03 DIAGNOSIS — M79.672 BILATERAL LEG AND FOOT PAIN: ICD-10-CM

## 2020-06-03 PROCEDURE — 99213 OFFICE O/P EST LOW 20 MIN: CPT | Performed by: FAMILY MEDICINE

## 2020-06-03 RX ORDER — GABAPENTIN 300 MG/1
300 CAPSULE ORAL 3 TIMES DAILY
Qty: 90 CAPSULE | Refills: 3
Start: 2020-06-03 | End: 2020-06-03 | Stop reason: SDUPTHER

## 2020-06-03 RX ORDER — CHLORPHENIRAMINE MALEATE 4 MG/1
4 TABLET ORAL EVERY 6 HOURS PRN
Qty: 90 TABLET | Refills: 5 | Status: SHIPPED | OUTPATIENT
Start: 2020-06-03 | End: 2020-08-14

## 2020-06-03 RX ORDER — GABAPENTIN 300 MG/1
300 CAPSULE ORAL 3 TIMES DAILY
Qty: 90 CAPSULE | Refills: 3 | Status: SHIPPED | OUTPATIENT
Start: 2020-06-03 | End: 2020-07-15 | Stop reason: SDUPTHER

## 2020-06-03 RX ORDER — DOXYCYCLINE 100 MG/1
100 CAPSULE ORAL 2 TIMES DAILY
Qty: 20 CAPSULE | Refills: 0 | Status: SHIPPED | OUTPATIENT
Start: 2020-06-03 | End: 2020-07-15

## 2020-06-03 NOTE — PROGRESS NOTES
Subjective   Guevara Meza is a 60 y.o. overweight white male nonsmoker with HTN, Allergies, [place] (Hearing aides), H/O Prostate cancer S/P Prostatectomy at [facility]. See PMH/PSH. Pt Calls to discuss acute health problems, Tx and F/U plans.  You have chosen to receive care through a telephone visit. Do you consent to use a telephone visit for your medical care today? Yes    ' Sinuses had improved after last visit, but have started back, have pressure, drainage, had some bleeding. Using Allergy meds. B/P has been OK. Neurontin helps with chronic pain, need refill'.    Sinusitis   This is a new problem. The current episode started 1 to 4 weeks ago. The problem has been gradually improving since onset. There has been no fever. His pain is at a severity of 1/10. The pain is mild. Associated symptoms include sinus pressure. Pertinent negatives include no ear pain or sneezing. Past treatments include antibiotics and acetaminophen (Antihistamine).   Sore Throat    This is a recurrent problem. The current episode started 1 to 4 weeks ago. The problem has been waxing and waning. There has been no fever. The pain is at a severity of 2/10. The pain is mild. Pertinent negatives include no diarrhea, drooling, ear discharge, ear pain, stridor or trouble swallowing. Associated symptoms comments: Sinus drainage. Treatments tried: Antihistamine, Atrovent nasal. The treatment provided no relief.   Hypertension   This is a chronic problem. The current episode started more than 1 year ago. The problem is controlled. Pertinent negatives include no palpitations. Agents associated with hypertension include decongestants and NSAIDs. Risk factors for coronary artery disease include male gender and dyslipidemia. Past treatments include ACE inhibitors. Current antihypertension treatment includes ACE inhibitors. The current treatment provides significant improvement.   Pain   This is a chronic (Foot, Back, Shoulder pain. Low back is  flaring) problem. The current episode started more than 1 year ago. The symptoms are aggravated by standing, walking and exertion. Treatments tried: Neurontin. The treatment provided moderate relief.   Insomnia   This is a chronic problem. The current episode started more than 1 year ago. The problem occurs intermittently. Treatments tried: Benadryl, Neurontin. The treatment provided moderate relief.        The following portions of the patient's history were reviewed and updated as appropriate: allergies, current medications, past family history, past medical history, past social history, past surgical history and problem list.    Review of Systems   Constitutional: Negative for activity change, appetite change and unexpected weight change.   HENT: Positive for hearing loss, nosebleeds, postnasal drip, sinus pressure and tinnitus. Negative for dental problem, drooling, ear discharge, ear pain, facial swelling, mouth sores, rhinorrhea, sinus pain, sneezing, trouble swallowing and voice change.    Eyes: Positive for itching. Negative for photophobia, pain, discharge, redness and visual disturbance.        Wears glasses     Eye exams with Dr. Glaser   Respiratory: Negative for apnea, choking, chest tightness, wheezing and stridor.    Cardiovascular: Negative for palpitations and leg swelling.   Gastrointestinal: Negative for abdominal distention, anal bleeding, blood in stool, constipation, diarrhea and rectal pain.        Rectal stenosis   Endocrine: Negative for cold intolerance, heat intolerance, polydipsia, polyphagia and polyuria.   Genitourinary: Positive for enuresis. Negative for decreased urine volume, difficulty urinating, discharge, dysuria, flank pain, frequency, genital sores, hematuria, penile pain, penile swelling, scrotal swelling, testicular pain and urgency.        Small R hernia felt in scrotum at times, told OK    Musculoskeletal: Positive for back pain. Negative for gait problem and neck  stiffness.        R foot, Plantar faciitis.  R calf pain.     L5 DDD  Occasional back pain more on R    R shoulder pain   Skin: Negative for color change, pallor and wound.        Eczema   Allergic/Immunologic: Positive for environmental allergies. Negative for food allergies and immunocompromised state.   Neurological: Positive for tremors. Negative for dizziness, seizures, syncope, facial asymmetry, speech difficulty and light-headedness.   Hematological: Negative for adenopathy. Bruises/bleeds easily.   Psychiatric/Behavioral: Positive for sleep disturbance. Negative for agitation, behavioral problems, confusion, decreased concentration, dysphoric mood, hallucinations, self-injury and suicidal ideas. The patient has insomnia. The patient is not nervous/anxious and is not hyperactive.         Neurotin helps with sleep,      Sleeps 5 hrs with med.        Objective   Physical Exam   Constitutional: He is oriented to person, place, and time. No distress.   Pulmonary/Chest: Effort normal.   Neurological: He is alert and oriented to person, place, and time.   Psychiatric: He has a normal mood and affect. His behavior is normal. Judgment and thought content normal.   Nursing note and vitals reviewed.      Assessment/Plan   Guevara was seen today for hypertension, allergies and sinusitis.    Diagnoses and all orders for this visit:    Sinus problem  -     chlorpheniramine (Chlor-Trimeton) 4 MG tablet; Take 1 tablet by mouth Every 6 (Six) Hours As Needed for Allergies.    Seasonal allergies  -     chlorpheniramine (Chlor-Trimeton) 4 MG tablet; Take 1 tablet by mouth Every 6 (Six) Hours As Needed for Allergies.    Right leg pain  -     Discontinue: gabapentin (NEURONTIN) 300 MG capsule; Take 1 capsule by mouth 3 (Three) Times a Day.  -     gabapentin (NEURONTIN) 300 MG capsule; Take 1 capsule by mouth 3 (Three) Times a Day.    Bilateral leg and foot pain  -     Discontinue: gabapentin (NEURONTIN) 300 MG capsule; Take 1  capsule by mouth 3 (Three) Times a Day.  -     gabapentin (NEURONTIN) 300 MG capsule; Take 1 capsule by mouth 3 (Three) Times a Day.    Chronic pansinusitis    Essential hypertension    Other orders  -     doxycycline (MONODOX) 100 MG capsule; Take 1 capsule by mouth 2 (Two) Times a Day.    Discussed health problems, meds, indications, Tx plan, rationale. Discussed F/u with specialist. Discussed safety with controlled med. Discussed F/U plan.  This visit has been rescheduled as a phone visit to comply with patient safety concerns in accordance with CDC recommendations. Total time of discussion was 15 minutes.            This document has been electronically signed by Rd Pabon MD

## 2020-06-05 NOTE — PATIENT INSTRUCTIONS
Preventive Care 40-64 Years Old, Female  Preventive care refers to visits with your health care provider and lifestyle choices that can promote health and wellness. This includes:  · A yearly physical exam. This may also be called an annual well check.  · Regular dental visits and eye exams.  · Immunizations.  · Screening for certain conditions.  · Healthy lifestyle choices, such as eating a healthy diet, getting regular exercise, not using drugs or products that contain nicotine and tobacco, and limiting alcohol use.  What can I expect for my preventive care visit?  Physical exam  Your health care provider will check your:  · Height and weight. This may be used to calculate body mass index (BMI), which tells if you are at a healthy weight.  · Heart rate and blood pressure.  · Skin for abnormal spots.  Counseling  Your health care provider may ask you questions about your:  · Alcohol, tobacco, and drug use.  · Emotional well-being.  · Home and relationship well-being.  · Sexual activity.  · Eating habits.  · Work and work environment.  · Method of birth control.  · Menstrual cycle.  · Pregnancy history.  What immunizations do I need?    Influenza (flu) vaccine  · This is recommended every year.  Tetanus, diphtheria, and pertussis (Tdap) vaccine  · You may need a Td booster every 10 years.  Varicella (chickenpox) vaccine  · You may need this if you have not been vaccinated.  Zoster (shingles) vaccine  · You may need this after age 60.  Measles, mumps, and rubella (MMR) vaccine  · You may need at least one dose of MMR if you were born in 1957 or later. You may also need a second dose.  Pneumococcal conjugate (PCV13) vaccine  · You may need this if you have certain conditions and were not previously vaccinated.  Pneumococcal polysaccharide (PPSV23) vaccine  · You may need one or two doses if you smoke cigarettes or if you have certain conditions.  Meningococcal conjugate (MenACWY) vaccine  · You may need this if you  have certain conditions.  Hepatitis A vaccine  · You may need this if you have certain conditions or if you travel or work in places where you may be exposed to hepatitis A.  Hepatitis B vaccine  · You may need this if you have certain conditions or if you travel or work in places where you may be exposed to hepatitis B.  Haemophilus influenzae type b (Hib) vaccine  · You may need this if you have certain conditions.  Human papillomavirus (HPV) vaccine  · If recommended by your health care provider, you may need three doses over 6 months.  You may receive vaccines as individual doses or as more than one vaccine together in one shot (combination vaccines). Talk with your health care provider about the risks and benefits of combination vaccines.  What tests do I need?  Blood tests  · Lipid and cholesterol levels. These may be checked every 5 years, or more frequently if you are over 50 years old.  · Hepatitis C test.  · Hepatitis B test.  Screening  · Lung cancer screening. You may have this screening every year starting at age 55 if you have a 30-pack-year history of smoking and currently smoke or have quit within the past 15 years.  · Colorectal cancer screening. All adults should have this screening starting at age 50 and continuing until age 75. Your health care provider may recommend screening at age 45 if you are at increased risk. You will have tests every 1-10 years, depending on your results and the type of screening test.  · Diabetes screening. This is done by checking your blood sugar (glucose) after you have not eaten for a while (fasting). You may have this done every 1-3 years.  · Mammogram. This may be done every 1-2 years. Talk with your health care provider about when you should start having regular mammograms. This may depend on whether you have a family history of breast cancer.  · BRCA-related cancer screening. This may be done if you have a family history of breast, ovarian, tubal, or peritoneal  cancers.  · Pelvic exam and Pap test. This may be done every 3 years starting at age 21. Starting at age 30, this may be done every 5 years if you have a Pap test in combination with an HPV test.  Other tests  · Sexually transmitted disease (STD) testing.  · Bone density scan. This is done to screen for osteoporosis. You may have this scan if you are at high risk for osteoporosis.  Follow these instructions at home:  Eating and drinking  · Eat a diet that includes fresh fruits and vegetables, whole grains, lean protein, and low-fat dairy.  · Take vitamin and mineral supplements as recommended by your health care provider.  · Do not drink alcohol if:  ? Your health care provider tells you not to drink.  ? You are pregnant, may be pregnant, or are planning to become pregnant.  · If you drink alcohol:  ? Limit how much you have to 0-1 drink a day.  ? Be aware of how much alcohol is in your drink. In the [place] one drink equals one 12 oz bottle of beer (355 mL), one 5 oz glass of wine (148 mL), or one 1½ oz glass of hard liquor (44 mL).  Lifestyle  · Take daily care of your teeth and gums.  · Stay active. Exercise for at least 30 minutes on 5 or more days each week.  · Do not use any products that contain nicotine or tobacco, such as cigarettes, e-cigarettes, and chewing tobacco. If you need help quitting, ask your health care provider.  · If you are sexually active, practice safe sex. Use a condom or other form of birth control (contraception) in order to prevent pregnancy and STIs (sexually transmitted infections).  · If told by your health care provider, take low-dose aspirin daily starting at age 50.  What's next?  · Visit your health care provider once a year for a well check visit.  · Ask your health care provider how often you should have your eyes and teeth checked.  · Stay up to date on all vaccines.  This information is not intended to replace advice given to you by your health care provider. Make sure you  discuss any questions you have with your health care provider.  Document Released: 01/13/2017 Document Revised: 08/29/2019 Document Reviewed: 08/29/2019  Elsevier Patient Education © 2020 [employer]

## 2020-06-23 ENCOUNTER — TELEPHONE (OUTPATIENT)
Dept: FAMILY MEDICINE CLINIC | Facility: CLINIC | Age: 60
End: 2020-06-23

## 2020-06-23 DIAGNOSIS — R09.89 GLOBUS SENSATION: Primary | ICD-10-CM

## 2020-06-23 NOTE — TELEPHONE ENCOUNTER
would like a referral to ENT. He called stating that he feels like he is choking like someone has hands around his neck. He also stATED THAT THE ANTIBIOTIC that he was given gave him sores in his mouth. Please call

## 2020-06-23 NOTE — TELEPHONE ENCOUNTER
Spoke with pt to let him know medication was called in for the sores in his mouth. States he does not currently have sores, they had gone away when he stopped the medication.   Let pt know referral was placed for ENT, he would like to see Dr. Solano. Updated referral info, let pt know Dr. Solano's office would be calling him to schedule his appt.   Pt voiced understanding.

## 2020-07-15 ENCOUNTER — OFFICE VISIT (OUTPATIENT)
Dept: FAMILY MEDICINE CLINIC | Facility: CLINIC | Age: 60
End: 2020-07-15

## 2020-07-15 VITALS
HEIGHT: 72 IN | DIASTOLIC BLOOD PRESSURE: 70 MMHG | WEIGHT: 204.7 LBS | SYSTOLIC BLOOD PRESSURE: 128 MMHG | BODY MASS INDEX: 27.73 KG/M2 | OXYGEN SATURATION: 98 % | TEMPERATURE: 98.2 F | HEART RATE: 63 BPM

## 2020-07-15 DIAGNOSIS — M79.605 BILATERAL LEG AND FOOT PAIN: ICD-10-CM

## 2020-07-15 DIAGNOSIS — N52.31 ERECTILE DYSFUNCTION AFTER RADICAL PROSTATECTOMY: ICD-10-CM

## 2020-07-15 DIAGNOSIS — R04.0 BLEEDING FROM THE NOSE: ICD-10-CM

## 2020-07-15 DIAGNOSIS — M79.2 NEUROPATHIC PAIN: ICD-10-CM

## 2020-07-15 DIAGNOSIS — M79.604 RIGHT LEG PAIN: ICD-10-CM

## 2020-07-15 DIAGNOSIS — E78.5 HYPERLIPIDEMIA, UNSPECIFIED HYPERLIPIDEMIA TYPE: ICD-10-CM

## 2020-07-15 DIAGNOSIS — Z90.79 H/O PROSTATECTOMY: ICD-10-CM

## 2020-07-15 DIAGNOSIS — M79.604 BILATERAL LEG AND FOOT PAIN: ICD-10-CM

## 2020-07-15 DIAGNOSIS — M79.671 BILATERAL LEG AND FOOT PAIN: ICD-10-CM

## 2020-07-15 DIAGNOSIS — R39.81 FUNCTIONAL URINARY INCONTINENCE: ICD-10-CM

## 2020-07-15 DIAGNOSIS — M79.672 BILATERAL LEG AND FOOT PAIN: ICD-10-CM

## 2020-07-15 DIAGNOSIS — J34.9 SINUS PROBLEM: ICD-10-CM

## 2020-07-15 PROCEDURE — 99214 OFFICE O/P EST MOD 30 MIN: CPT | Performed by: FAMILY MEDICINE

## 2020-07-15 RX ORDER — GABAPENTIN 300 MG/1
300 CAPSULE ORAL 3 TIMES DAILY
Qty: 90 CAPSULE | Refills: 3 | Status: SHIPPED | OUTPATIENT
Start: 2020-07-15 | End: 2020-10-15 | Stop reason: SDUPTHER

## 2020-07-15 RX ORDER — ATORVASTATIN CALCIUM 20 MG/1
20 TABLET, FILM COATED ORAL EVERY OTHER DAY
Qty: 90 TABLET | Refills: 1 | Status: SHIPPED | OUTPATIENT
Start: 2020-07-15 | End: 2020-07-15 | Stop reason: SDUPTHER

## 2020-07-15 RX ORDER — ATORVASTATIN CALCIUM 20 MG/1
20 TABLET, FILM COATED ORAL EVERY OTHER DAY
Qty: 90 TABLET | Refills: 1 | Status: SHIPPED | OUTPATIENT
Start: 2020-07-15 | End: 2021-05-27 | Stop reason: SDUPTHER

## 2020-07-15 NOTE — PROGRESS NOTES
Subjective   Guevara Meza is a 60 y.o. overweight white male nonsmoker with HTN, Allergies, [place] (Hearing aides),Neuropathic pain,  H/O Prostate cancer S/P Prostatectomy at [facility]. See PMH/PSH. Pt Calls to discuss acute health problems, Tx and F/U plans.    ' Still have allergy and sinus problems. Sinus drainage causes nose bleeds, and a sore throat. Neurontin helps with back and shoulder and foot pain'.     Sinusitis   This is a new problem. The current episode started 1 to 4 weeks ago. The problem has been gradually improving since onset. There has been no fever. His pain is at a severity of 1/10. The pain is mild. Associated symptoms include sinus pressure and a sore throat. Pertinent negatives include no ear pain or sneezing. Past treatments include antibiotics and acetaminophen (Antihistamine).   Sore Throat    This is a recurrent problem. The current episode started 1 to 4 weeks ago. The problem has been waxing and waning. There has been no fever. The pain is at a severity of 2/10. The pain is mild. Pertinent negatives include no diarrhea, drooling, ear discharge, ear pain, stridor or trouble swallowing. Associated symptoms comments: Sinus drainage. Treatments tried: Antihistamine, Atrovent nasal. The treatment provided no relief.   Hypertension   This is a chronic problem. The current episode started more than 1 year ago. The problem is controlled. Pertinent negatives include no palpitations. Agents associated with hypertension include decongestants and NSAIDs. Risk factors for coronary artery disease include male gender and dyslipidemia. Past treatments include ACE inhibitors. Current antihypertension treatment includes ACE inhibitors. The current treatment provides significant improvement.   Pain   This is a chronic (Foot, Back, Shoulder pain. Low back is flaring) problem. The current episode started more than 1 year ago. Associated symptoms include a sore throat. The symptoms are aggravated by  standing, walking and exertion. Treatments tried: Neurontin. The treatment provided moderate relief.   Insomnia   This is a chronic problem. The current episode started more than 1 year ago. The problem occurs intermittently. Associated symptoms include a sore throat. Treatments tried: Benadryl, Neurontin. The treatment provided moderate relief.        The following portions of the patient's history were reviewed and updated as appropriate: allergies, current medications, past family history, past medical history, past social history, past surgical history and problem list.    Review of Systems   Constitutional: Negative for activity change, appetite change and unexpected weight change.   HENT: Positive for hearing loss, nosebleeds, postnasal drip, sinus pressure, sore throat and tinnitus. Negative for dental problem, drooling, ear discharge, ear pain, facial swelling, mouth sores, rhinorrhea, sinus pain, sneezing, trouble swallowing and voice change.    Eyes: Positive for itching. Negative for photophobia, pain, discharge, redness and visual disturbance.        Wears glasses     Eye exams with Dr. Glaser   Respiratory: Negative for apnea, choking, chest tightness, wheezing and stridor.    Cardiovascular: Negative for palpitations and leg swelling.   Gastrointestinal: Negative for abdominal distention, anal bleeding, blood in stool, constipation, diarrhea and rectal pain.        Rectal stenosis   Endocrine: Negative for cold intolerance, heat intolerance, polydipsia, polyphagia and polyuria.   Genitourinary: Positive for enuresis. Negative for decreased urine volume, difficulty urinating, discharge, dysuria, flank pain, frequency, genital sores, hematuria, penile pain, penile swelling, scrotal swelling, testicular pain and urgency.        Small R hernia felt in scrotum at times, told OK    Musculoskeletal: Positive for back pain. Negative for gait problem and neck stiffness.        R foot, Plantar faciitis.  R calf  pain.     L5 DDD  Occasional back pain more on R    R shoulder pain   Skin: Negative for color change, pallor and wound.        Eczema   Allergic/Immunologic: Positive for environmental allergies. Negative for food allergies and immunocompromised state.   Neurological: Positive for tremors. Negative for dizziness, seizures, syncope, facial asymmetry, speech difficulty and light-headedness.   Hematological: Negative for adenopathy. Bruises/bleeds easily.   Psychiatric/Behavioral: Positive for sleep disturbance. Negative for agitation, behavioral problems, confusion, decreased concentration, dysphoric mood, hallucinations, self-injury and suicidal ideas. The patient has insomnia. The patient is not nervous/anxious and is not hyperactive.         Neurotin helps with pain and sleep,      Sleeps 5 hrs with med.        Objective   Physical Exam   Constitutional: He is oriented to person, place, and time. He appears well-developed and well-nourished. No distress.   HENT:   Head: Normocephalic.   Right Ear: External ear normal.   Left Ear: External ear normal.   Nose: Nose normal.   Mouth/Throat: Oropharynx is clear and moist. No oropharyngeal exudate.   Eyes: Pupils are equal, round, and reactive to light. Conjunctivae and EOM are normal. Right eye exhibits no discharge. Left eye exhibits no discharge. No scleral icterus.   Neck: Normal range of motion. Neck supple. No JVD present. No tracheal deviation present. No thyromegaly present.   Cardiovascular: Normal rate, regular rhythm, normal heart sounds and intact distal pulses. Exam reveals no gallop and no friction rub.   No murmur heard.  Pulmonary/Chest: Effort normal and breath sounds normal. No stridor. No respiratory distress. He has no wheezes. He has no rales. He exhibits no tenderness.   Abdominal: Soft. Bowel sounds are normal. He exhibits no distension and no mass. There is no tenderness. There is no rebound and no guarding. No hernia.   Musculoskeletal: Normal  range of motion. He exhibits no edema, tenderness or deformity.   R shoulder pain WB 4 with ROM  Has WB 2 with Sav SL and C/O   Reports pain goes R low back down R leg.    Lymphadenopathy:     He has no cervical adenopathy.   Neurological: He is alert and oriented to person, place, and time. He has normal reflexes. He displays normal reflexes. No cranial nerve deficit or sensory deficit. He exhibits normal muscle tone. Coordination normal.   Skin: Skin is warm and dry. Capillary refill takes 2 to 3 seconds. No rash noted. He is not diaphoretic. No erythema. No pallor.   Psychiatric: He has a normal mood and affect. His behavior is normal. Judgment and thought content normal.   Nursing note and vitals reviewed.      Assessment/Plan   Guevara was seen today for hypertension, leg pain, foot pain and sinusitis.    Diagnoses and all orders for this visit:    Hyperlipidemia, unspecified hyperlipidemia type  Comments:  Lipitor  Orders:  -     Discontinue: atorvastatin (LIPITOR) 20 MG tablet; Take 1 tablet by mouth Every Other Day.  -     atorvastatin (LIPITOR) 20 MG tablet; Take 1 tablet by mouth Every Other Day.    Right leg pain  -     gabapentin (NEURONTIN) 300 MG capsule; Take 1 capsule by mouth 3 (Three) Times a Day.    Bilateral leg and foot pain  -     gabapentin (NEURONTIN) 300 MG capsule; Take 1 capsule by mouth 3 (Three) Times a Day.    Sinus problem  -     Gel Nasal Moisturizer Saline AYR 0.5OZ    Erectile dysfunction after radical prostatectomy    Functional urinary incontinence    H/O prostatectomy    Neuropathic pain    Bleeding from the nose    Discussed exam, health problems, med, indication, Tx plan, rationale. Discussed safety with controlled med. Discussed trial of saline gel for nose bleed. Discussed F/U with specialist. Discussed F/U here.        This document has been electronically signed by Rd Pabon MD

## 2020-07-18 PROBLEM — R04.0 BLEEDING FROM THE NOSE: Status: ACTIVE | Noted: 2020-07-18

## 2020-07-18 PROBLEM — M79.2 NEUROPATHIC PAIN: Status: ACTIVE | Noted: 2020-07-18

## 2020-07-20 ENCOUNTER — TELEPHONE (OUTPATIENT)
Dept: FAMILY MEDICINE CLINIC | Facility: CLINIC | Age: 60
End: 2020-07-20

## 2020-07-20 NOTE — TELEPHONE ENCOUNTER
Caller: Guevara Meza    Relationship to patient: Self    Best call back number: [phone]    Patient is needing: PATIENT WOULD LIKE TO SPEAK WITH DR. FRENCH ABOUT HIS SORE THROAT. HIS WIFE BELIEVES HE MIGHT HAVE ACID REFLUX OR GERD.

## 2020-07-21 RX ORDER — OMEPRAZOLE 40 MG/1
40 CAPSULE, DELAYED RELEASE ORAL DAILY
Qty: 30 CAPSULE | Refills: 2 | Status: SHIPPED | OUTPATIENT
Start: 2020-07-21 | End: 2020-10-15 | Stop reason: SDUPTHER

## 2020-07-21 NOTE — TELEPHONE ENCOUNTER
Sent Rx for omeprazole in, GERD usually causes a cough, but can cause a sore throat. Will see if tx will help. Keep us updated.

## 2020-08-14 ENCOUNTER — OFFICE VISIT (OUTPATIENT)
Dept: OTOLARYNGOLOGY | Facility: CLINIC | Age: 60
End: 2020-08-14

## 2020-08-14 VITALS — WEIGHT: 204 LBS | HEIGHT: 78 IN | OXYGEN SATURATION: 99 % | BODY MASS INDEX: 23.6 KG/M2

## 2020-08-14 DIAGNOSIS — J37.0 CHRONIC LARYNGITIS: ICD-10-CM

## 2020-08-14 DIAGNOSIS — R04.0 NASAL BLEEDING: ICD-10-CM

## 2020-08-14 DIAGNOSIS — B37.0 ORAL CANDIDA: Primary | ICD-10-CM

## 2020-08-14 PROCEDURE — 99203 OFFICE O/P NEW LOW 30 MIN: CPT | Performed by: OTOLARYNGOLOGY

## 2020-08-17 NOTE — PROGRESS NOTES
"Subjective   Guevara Meza is a 60 y.o. male.     History of Present Illness   Patient has several complaints today.  States that earlier in the year he saw an otolaryngologist from [place] who diagnosed him with rhinitis and gave him a nose spray.  He says now he is having intermittent nosebleeds.  He no longer uses the nose spray.  He \"snorts water\" and that seems to help.  Additionally he says back in the spring he had a sore throat and was given antibiotics on 2 occasions and subsequently was diagnosed with what sounds like thrush.  He was prescribed nystatin but the patient states he initially did not get it filled because he thought he was getting better now he reports that he has discoloration on his tongue and a bad taste in his mouth.  He subsequently obtained the nystatin prescription and began using it although he has not been using it 4 times a day every day and is only been using it about 7 or 8 days.  He thinks this is helping.  Also occasionally feels like he is choking although it does not seem to be affecting his ability to eat or drink.  He has acid reflux for which he takes omeprazole.  He has never used tobacco products.      The following portions of the patient's history were reviewed and updated as appropriate: allergies, current medications, past family history, past medical history, past social history, past surgical history and problem list.      Guevara Meza reports that he has never smoked. He has never used smokeless tobacco. He reports that he does not drink alcohol or use drugs.  Patient is not a tobacco user and has not been counseled for use of tobacco products    Family History   Problem Relation Age of Onset   • Stroke Mother    • No Known Problems Father        Allergies   Allergen Reactions   • Penicillin G Swelling         Current Outpatient Medications:   •  Ascorbic Acid (VITAMIN C PO), Take  by mouth., Disp: , Rfl:   •  aspirin 81 MG tablet, Take 81 mg by " mouth Daily., Disp: , Rfl:   •  atorvastatin (LIPITOR) 20 MG tablet, Take 1 tablet by mouth Every Other Day., Disp: 90 tablet, Rfl: 1  •  diphenhydrAMINE HCl (BENADRYL ALLERGY PO), Take  by mouth., Disp: , Rfl:   •  gabapentin (NEURONTIN) 300 MG capsule, Take 1 capsule by mouth 3 (Three) Times a Day., Disp: 90 capsule, Rfl: 3  •  Multiple Vitamin (MULTI VITAMIN DAILY PO), Take  by mouth., Disp: , Rfl:   •  omeprazole (priLOSEC) 40 MG capsule, Take 1 capsule by mouth Daily., Disp: 30 capsule, Rfl: 2  •  ramipril (ALTACE) 10 MG capsule, Take 1 capsule by mouth Daily., Disp: 90 capsule, Rfl: 1  •  VITAMIN D PO, Take  by mouth., Disp: , Rfl:   •  VITAMIN E PO, Take  by mouth., Disp: , Rfl:   •  nystatin (MYCOSTATIN) [id number] UNIT/ML suspension, Take 5 mL by mouth 4 (Four) Times a Day. Swish and spit, Disp: 200 mL, Rfl: 0    Past Medical History:   Diagnosis Date   • Allergic    • Cancer (CMS/HCC)     prostate   • History of inguinal hernia    • Hyperlipidemia    • Hypertension    • Shoulder dislocation        Past Surgical History:   Procedure Laterality Date   • CHOLECYSTECTOMY     • COLONOSCOPY     • PROSTATE SURGERY     • ROTATOR CUFF REPAIR Right          Review of Systems   HENT: Positive for congestion, hearing loss, nosebleeds and postnasal drip.    Eyes: Positive for itching and visual disturbance.   Psychiatric/Behavioral:        Not assessed   All other systems reviewed and are negative.          Objective   Physical Exam  General: Well-developed well-nourished male in no acute distress.  Alert and oriented x3. Head: Normocephalic. Face: Symmetrical strength and appearance. PERRL. EOMI. Voice:Strong. Speech:Fluent  Ears: External ears no deformity, canals no discharge, tympanic membranes intact clear and mobile bilaterally.  Nose: Nares show no discharge mass polyp or purulence.  Boggy mucosa is present.  No gross external deformity.  Septum: To the right, with no obvious bleeder or prominent vessel  Oral  cavity: Lips and gums without lesions.  Tongue with some mild whitish discoloration consistent with a resolving candidiasis.  Floor mouth without lesions.  Parotid and submandibular ducts unobstructed.  No mucosal lesions on the buccal mucosa or vestibule of the mouth.  Pharynx: No erythema exudate mass or ulcer.  1+ tonsils present.  Mirror exam of the larynx shows some mild erythema of the arytenoids and posterior glottis with no evidence of neoplasm.  Vocal cord mobility is intact.  Neck: No lymphadenopathy.  No thyromegaly.  Trachea and larynx midline.  No masses in the parotid or submandibular glands.        Assessment/Plan   Guevara was seen today for difficulty swallowing and mouth lesions.    Diagnoses and all orders for this visit:    Oral candida    Chronic laryngitis    Nasal bleeding    Other orders  -     nystatin (MYCOSTATIN) [id number] UNIT/ML suspension; Take 5 mL by mouth 4 (Four) Times a Day. Swish and spit      Plan: I prescribed him some additional nystatin.  I want him to use 5 cc swish and spit 4 times a day for a full 10 days beyond today's visit.  Also advised using nasal saline spray frequently for nosebleeds.  Follow-up with me as needed for nonresolution of symptoms.

## 2020-08-20 ENCOUNTER — OFFICE VISIT (OUTPATIENT)
Dept: FAMILY MEDICINE CLINIC | Facility: CLINIC | Age: 60
End: 2020-08-20

## 2020-08-20 VITALS — TEMPERATURE: 99.2 F

## 2020-08-20 DIAGNOSIS — J06.9 URI WITH COUGH AND CONGESTION: Primary | ICD-10-CM

## 2020-08-20 DIAGNOSIS — R10.9 RIGHT FLANK PAIN: ICD-10-CM

## 2020-08-20 PROCEDURE — 99442 PR PHYS/QHP TELEPHONE EVALUATION 11-20 MIN: CPT | Performed by: NURSE PRACTITIONER

## 2020-08-20 RX ORDER — BENZONATATE 100 MG/1
100 CAPSULE ORAL 3 TIMES DAILY PRN
Qty: 30 CAPSULE | Refills: 0 | Status: SHIPPED | OUTPATIENT
Start: 2020-08-20 | End: 2020-10-15

## 2020-08-20 RX ORDER — AZITHROMYCIN 250 MG/1
250 TABLET, FILM COATED ORAL DAILY
Qty: 6 TABLET | Refills: 0 | Status: SHIPPED | OUTPATIENT
Start: 2020-08-20 | End: 2020-08-26

## 2020-08-20 RX ORDER — CHLORPHENIRAMINE MALEATE 4 MG/1
4 TABLET ORAL EVERY 6 HOURS PRN
Qty: 30 TABLET | Refills: 0 | Status: SHIPPED | OUTPATIENT
Start: 2020-08-20 | End: 2021-01-27

## 2020-08-20 NOTE — PATIENT INSTRUCTIONS

## 2020-08-20 NOTE — PROGRESS NOTES
"Subjective   Guevara Meza is a 60 y.o. male.     You have chosen to receive care through a telephone visit. Do you consent to use a telephone visit for your medical care today? Yes    PCP: Dr. Pabon    CC: \"chills, body aches, back pain\"    No known Covid exposures.  Symptoms started after bush hogging.     URI    This is a new problem. The current episode started yesterday. The problem has been unchanged. The maximum temperature recorded prior to his arrival was 100.4 - 100.9 F (high temp: 100.4). Associated symptoms include congestion, coughing ( from drainage), rhinorrhea and a sore throat (scratchy from drainage). Pertinent negatives include no abdominal pain, chest pain, diarrhea, dysuria, ear pain, headaches, joint pain, joint swelling, nausea, neck pain, plugged ear sensation, rash, sinus pain, sneezing, swollen glands, vomiting or wheezing. Treatments tried: motrin. The treatment provided mild relief.        The following portions of the patient's history were reviewed and updated as appropriate: allergies, current medications, past medical history, past social history, past surgical history and problem list.    Review of Systems   Constitutional: Positive for chills ( when fever present), fatigue and fever ( max 100.4).   HENT: Positive for congestion, postnasal drip, rhinorrhea and sore throat (scratchy from drainage). Negative for ear discharge, ear pain, sinus pressure, sneezing, swollen glands and trouble swallowing.    Eyes: Negative.    Respiratory: Positive for cough ( from drainage). Negative for chest tightness, shortness of breath and wheezing.    Cardiovascular: Negative for chest pain.   Gastrointestinal: Negative for abdominal pain, diarrhea, nausea and vomiting.   Genitourinary: Positive for flank pain (mild right), frequency and urinary incontinence (chronic since prostatectomy for prostate cancer in 2018--no change). Negative for dysuria and urgency.   Musculoskeletal: Negative for " joint pain, neck pain and neck stiffness.   Skin: Negative for rash.   Neurological: Positive for headache. Negative for dizziness and light-headedness.     Temp 99.2 °F (37.3 °C)     Objective   Physical Exam   Constitutional: He is oriented to person, place, and time.   Neurological: He is alert and oriented to person, place, and time.   Psychiatric: He has a normal mood and affect. His behavior is normal. Thought content normal.     No results found for this or any previous visit (from the past 24 hour(s)).  No Images in the past 120 days found..      Assessment/Plan   Guevara was seen today for chills, generalized body aches and flank pain.    Diagnoses and all orders for this visit:    URI with cough and congestion  -     azithromycin (Zithromax Z-Huan) 250 MG tablet; Take 1 tablet by mouth Daily for 6 days. Take 2 tablets by mouth the first day, then 1 tablet daily for 4 days.  -     benzonatate (Tessalon Perles) 100 MG capsule; Take 1 capsule by mouth 3 (Three) Times a Day As Needed for Cough.  -     chlorpheniramine (Chlor-Trimeton) 4 MG tablet; Take 1 tablet by mouth Every 6 (Six) Hours As Needed (drainage).    Right flank pain  -     Urinalysis With Culture If Indicated -    Push fluids  Rest  Tylenol or Motrin as needed  Rx for Chlor tabs, Tessalon perles  Rx for Zithromax if purulent sputum/nasal discharge develops or no improvement in 4-5 days    U/A order placed if flank pain worsens or he develops dysuria, frequency, urgency, he will come in to lab to have done    See PCP or RTC if symptoms persist/worsen  See PCP for routine f/u visit and management of chronic medical conditions      This document has been electronically signed by RON Stallworth on August 20, 2020 11:26,.    This visit has been rescheduled as a phone visit to comply with patient safety concerns in accordance with CDC recommendations. Total time of discussion was 14 minutes.

## 2020-08-24 ENCOUNTER — TELEPHONE (OUTPATIENT)
Dept: FAMILY MEDICINE CLINIC | Facility: CLINIC | Age: 60
End: 2020-08-24

## 2020-08-24 NOTE — TELEPHONE ENCOUNTER
Pt did not have covid test or ua done, pt states he is feeling better but is feeling more tired that usual and has diarrhea I did let pt know that the zpak could cause some upset stomach and to make sure he is taking with food. I let pt know that if you felt the need for more advise that I would call him back. I did urge him to come get the UA done and if he is still having the cough to get covid testing done

## 2020-08-24 NOTE — TELEPHONE ENCOUNTER
"Caller: Guevara Meza    Relationship to patient: Self    Best call back number: [phone]    Patient is needing: \"A FOLLOW UP ON WHERE I SHOULD BE\"-PATIENT WOULD LIKE TO SPEAK WITH SUZI ABOUT HIS MEDICATIONS AND IF SHE THINKS HE IS IMPROVING.     PLEASE ADVISE.    "

## 2020-08-26 ENCOUNTER — TELEPHONE (OUTPATIENT)
Dept: FAMILY MEDICINE CLINIC | Facility: CLINIC | Age: 60
End: 2020-08-26

## 2020-08-26 ENCOUNTER — OFFICE VISIT (OUTPATIENT)
Dept: FAMILY MEDICINE CLINIC | Facility: CLINIC | Age: 60
End: 2020-08-26

## 2020-08-26 VITALS — SYSTOLIC BLOOD PRESSURE: 128 MMHG | HEART RATE: 64 BPM | DIASTOLIC BLOOD PRESSURE: 78 MMHG | TEMPERATURE: 97.3 F

## 2020-08-26 DIAGNOSIS — R05.9 COUGH: Primary | ICD-10-CM

## 2020-08-26 PROCEDURE — 99442 PR PHYS/QHP TELEPHONE EVALUATION 11-20 MIN: CPT | Performed by: FAMILY MEDICINE

## 2020-08-26 RX ORDER — PREDNISONE 20 MG/1
TABLET ORAL
COMMUNITY
Start: 2020-08-23 | End: 2021-01-31

## 2020-08-26 RX ORDER — GUAIFENESIN AND CODEINE PHOSPHATE 100; 10 MG/5ML; MG/5ML
5 SOLUTION ORAL 3 TIMES DAILY PRN
Qty: 118 ML | Refills: 1 | Status: SHIPPED | OUTPATIENT
Start: 2020-08-26 | End: 2020-10-15

## 2020-08-26 RX ORDER — FLUCONAZOLE 100 MG/1
100 TABLET ORAL EVERY OTHER DAY
Qty: 2 TABLET | Refills: 1 | Status: SHIPPED | OUTPATIENT
Start: 2020-08-26 | End: 2020-08-31 | Stop reason: SDUPTHER

## 2020-08-26 NOTE — TELEPHONE ENCOUNTER
PATIENT IS REQUESTING SOMETHING FOR HIS COUGH AND MUCOUS HE STATES IT IS WHITE AT THIS TIME     HE STATES HE STILL HAS THE YEAST INFECTION IN HIS MOUTH AND IT IS YELLOW     HE STATES HE HAS BEEN AROUND A FRIEND THAT WAS POSITIVE FOR COVID-19 A WEEK AGO     GOOD CONTACT NUMBER   [phone] (H)      VERIFIED PHARMACY   [facility] - [place], [place] - [address] - [phone] [facility] [phone] FX

## 2020-08-31 ENCOUNTER — LAB (OUTPATIENT)
Dept: LAB | Facility: HOSPITAL | Age: 60
End: 2020-08-31

## 2020-08-31 ENCOUNTER — OFFICE VISIT (OUTPATIENT)
Dept: FAMILY MEDICINE CLINIC | Facility: CLINIC | Age: 60
End: 2020-08-31

## 2020-08-31 VITALS
DIASTOLIC BLOOD PRESSURE: 80 MMHG | OXYGEN SATURATION: 95 % | SYSTOLIC BLOOD PRESSURE: 104 MMHG | WEIGHT: 199.5 LBS | RESPIRATION RATE: 20 BRPM | HEIGHT: 72 IN | BODY MASS INDEX: 27.02 KG/M2 | TEMPERATURE: 98.9 F | HEART RATE: 64 BPM

## 2020-08-31 DIAGNOSIS — N39.0 URINARY TRACT INFECTION WITH HEMATURIA, SITE UNSPECIFIED: Primary | ICD-10-CM

## 2020-08-31 DIAGNOSIS — R05.9 COUGH: ICD-10-CM

## 2020-08-31 DIAGNOSIS — R31.9 URINARY TRACT INFECTION WITH HEMATURIA, SITE UNSPECIFIED: Primary | ICD-10-CM

## 2020-08-31 DIAGNOSIS — R30.0 DYSURIA: ICD-10-CM

## 2020-08-31 LAB
BACTERIA UR QL AUTO: ABNORMAL /HPF
BILIRUB BLD-MCNC: NEGATIVE MG/DL
BILIRUB UR QL STRIP: NEGATIVE
CLARITY UR: ABNORMAL
CLARITY, POC: ABNORMAL
COLOR UR: YELLOW
COLOR UR: YELLOW
GLUCOSE UR STRIP-MCNC: NEGATIVE MG/DL
GLUCOSE UR STRIP-MCNC: NEGATIVE MG/DL
HGB UR QL STRIP.AUTO: ABNORMAL
HYALINE CASTS UR QL AUTO: ABNORMAL /LPF
KETONES UR QL STRIP: NEGATIVE
KETONES UR QL: NEGATIVE
LEUKOCYTE EST, POC: ABNORMAL
LEUKOCYTE ESTERASE UR QL STRIP.AUTO: ABNORMAL
NITRITE UR QL STRIP: NEGATIVE
NITRITE UR-MCNC: NEGATIVE MG/ML
PH UR STRIP.AUTO: 6 [PH] (ref 5–8)
PH UR: 6 [PH] (ref 5–8)
PROT UR QL STRIP: ABNORMAL
PROT UR STRIP-MCNC: NEGATIVE MG/DL
RBC # UR STRIP: ABNORMAL /UL
RBC # UR: ABNORMAL /HPF
REF LAB TEST METHOD: ABNORMAL
SP GR UR STRIP: 1.01 (ref 1–1.03)
SP GR UR: 1.01 (ref 1–1.03)
SQUAMOUS #/AREA URNS HPF: ABNORMAL /HPF
UROBILINOGEN UR QL STRIP: ABNORMAL
UROBILINOGEN UR QL: NORMAL
WBC UR QL AUTO: ABNORMAL /HPF

## 2020-08-31 PROCEDURE — 99214 OFFICE O/P EST MOD 30 MIN: CPT | Performed by: NURSE PRACTITIONER

## 2020-08-31 PROCEDURE — 87088 URINE BACTERIA CULTURE: CPT | Performed by: NURSE PRACTITIONER

## 2020-08-31 PROCEDURE — 87086 URINE CULTURE/COLONY COUNT: CPT | Performed by: NURSE PRACTITIONER

## 2020-08-31 PROCEDURE — 81001 URINALYSIS AUTO W/SCOPE: CPT | Performed by: NURSE PRACTITIONER

## 2020-08-31 PROCEDURE — 87186 SC STD MICRODIL/AGAR DIL: CPT | Performed by: NURSE PRACTITIONER

## 2020-08-31 PROCEDURE — 96372 THER/PROPH/DIAG INJ SC/IM: CPT | Performed by: NURSE PRACTITIONER

## 2020-08-31 PROCEDURE — 81003 URINALYSIS AUTO W/O SCOPE: CPT | Performed by: NURSE PRACTITIONER

## 2020-08-31 RX ORDER — SULFAMETHOXAZOLE AND TRIMETHOPRIM 800; 160 MG/1; MG/1
1 TABLET ORAL 2 TIMES DAILY
Qty: 20 TABLET | Refills: 0 | Status: SHIPPED | OUTPATIENT
Start: 2020-08-31 | End: 2020-10-15

## 2020-08-31 RX ORDER — FLUCONAZOLE 100 MG/1
100 TABLET ORAL EVERY OTHER DAY
Qty: 2 TABLET | Refills: 1 | Status: SHIPPED | OUTPATIENT
Start: 2020-08-31 | End: 2020-10-15

## 2020-08-31 RX ORDER — TRIAMCINOLONE ACETONIDE 40 MG/ML
40 INJECTION, SUSPENSION INTRA-ARTICULAR; INTRAMUSCULAR ONCE
Status: COMPLETED | OUTPATIENT
Start: 2020-08-31 | End: 2020-08-31

## 2020-08-31 RX ADMIN — TRIAMCINOLONE ACETONIDE 40 MG: 40 INJECTION, SUSPENSION INTRA-ARTICULAR; INTRAMUSCULAR at 13:11

## 2020-08-31 NOTE — PROGRESS NOTES
"Subjective   Guevara Meza is a 60 y.o. male.     [facility] Same Day Appointment    PCP: Dr. Pabon    CC: \"lower back pain, burning with urination\"    Had URI symptoms and treated about a week and a half ago and feeling better, but still having some lingering cough/chest congestion.  Has Cheratussin as needed.  Never had Covid test performed, but has two friends that were diagnosed in the last 2 weeks.      Difficulty Urinating   This is a new problem. The current episode started in the past 7 days. The problem occurs daily. The problem has been gradually worsening. Associated symptoms include congestion, coughing and urinary symptoms ( +dysuria). Pertinent negatives include no abdominal pain, anorexia, arthralgias, change in bowel habit, chest pain, chills, diaphoresis, fatigue, fever, headaches, joint swelling, myalgias, nausea, neck pain, numbness, rash, sore throat, swollen glands, vertigo, visual change, vomiting or weakness. Nothing aggravates the symptoms. Treatments tried: increased fluids. The treatment provided no relief.   Cough   This is a new problem. Episode onset: x 2 weeks. The problem has been gradually improving. The problem occurs hourly. The cough is productive of sputum (occasionally blood tinged after coughing really hard). Associated symptoms include wheezing (minimal). Pertinent negatives include no chest pain, chills, ear congestion, ear pain, fever, headaches, heartburn, hemoptysis, myalgias, nasal congestion, postnasal drip, rash, rhinorrhea, sore throat, shortness of breath, sweats or weight loss. Nothing aggravates the symptoms. Treatments tried: zithromax, tessalon perles, cheratussin, chlor tab.        The following portions of the patient's history were reviewed and updated as appropriate: allergies, current medications, past medical history, past social history, past surgical history and problem list.    Review of Systems   Constitutional: Positive for appetite change (improving). " "Negative for chills, diaphoresis, fatigue, fever and unexpected weight loss.   HENT: Positive for congestion. Negative for ear pain, postnasal drip, rhinorrhea, sinus pressure, sneezing, sore throat and swollen glands.    Eyes: Negative.    Respiratory: Positive for cough and wheezing (minimal). Negative for hemoptysis, chest tightness and shortness of breath.    Cardiovascular: Negative for chest pain, palpitations and leg swelling.   Gastrointestinal: Negative for abdominal pain, anorexia, change in bowel habit, diarrhea, nausea and vomiting.   Genitourinary: Positive for difficulty urinating, dysuria and frequency. Negative for flank pain ( low back pain only).   Musculoskeletal: Negative for arthralgias, joint swelling, myalgias and neck pain.   Skin: Negative for rash.   Neurological: Negative for dizziness, vertigo, weakness, numbness and headache.     /80 (BP Location: Left arm, Patient Position: Sitting, Cuff Size: Adult)   Pulse 64   Temp 98.9 °F (37.2 °C) (Temporal)   Resp 20   Ht 182.9 cm (72\")   Wt 90.5 kg (199 lb 8 oz)   SpO2 95%   BMI 27.06 kg/m²     Objective   Physical Exam   Constitutional: He is oriented to person, place, and time. He appears well-developed and well-nourished. No distress.   HENT:   Head: Normocephalic and atraumatic.   Eyes: Conjunctivae are normal. Right eye exhibits no discharge. Left eye exhibits no discharge.   Neck: Neck supple.   Cardiovascular: Normal rate and regular rhythm.   Pulmonary/Chest: Effort normal. He has no wheezes. He has no rales.   Tight, congested cough remains     Abdominal: Soft. Bowel sounds are normal. There is no tenderness. There is no rebound and no guarding.   Genitourinary:   Genitourinary Comments: No flank pain     Lymphadenopathy:     He has no cervical adenopathy.   Neurological: He is alert and oriented to person, place, and time.   Skin: Skin is warm and dry.   Nursing note and vitals reviewed.    Recent Results (from the past 24 " hour(s))   POCT urinalysis dipstick, automated    Collection Time: 08/31/20 12:31 PM   Result Value Ref Range    Color Yellow Yellow, Straw, Dark Yellow, Michelle    Clarity, UA Cloudy (A) Clear    Specific Gravity  1.010 1.005 - 1.030    pH, Urine 6.0 5.0 - 8.0    Leukocytes Large (3+) (A) Negative    Nitrite, UA Negative Negative    Protein, POC Negative Negative mg/dL    Glucose, UA Negative Negative, 1000 mg/dL (3+) mg/dL    Ketones, UA Negative Negative    Urobilinogen, UA Normal Normal    Bilirubin Negative Negative    Blood, UA 2+ (A) Negative     No Images in the past 120 days found..      Assessment/Plan   Diagnoses and all orders for this visit:    Urinary tract infection with hematuria, site unspecified  -     sulfamethoxazole-trimethoprim (BACTRIM DS,SEPTRA DS) 800-160 MG per tablet; Take 1 tablet by mouth 2 (Two) Times a Day.    Dysuria  -     POCT urinalysis dipstick, automated  -     Urine Culture - Urine, Urine, Clean Catch    Cough  -     triamcinolone acetonide (KENALOG-40) injection 40 mg  -     XR Chest PA & Lateral (In Office)    Other orders  -     fluconazole (Diflucan) 100 MG tablet; Take 1 tablet by mouth Every Other Day.      Push fluids, especially water  Empty bladder frequently  Tylenol as needed  Rx for Bactrim DS for UTI  Urine cx pending--will call if antibiotics need to be changed    CXR today--will call with results  Kenalog 40 mg IM x 1 in office  Continue with tessalon perles or Cheratussin as needed for cough    See PCP or RTC if symptoms persist/worsen  See PCP for routine f/u visit and management of chronic medical conditions      This document has been electronically signed by RON Stallworth on August 31, 2020 13:11,.

## 2020-08-31 NOTE — PROGRESS NOTES
Subjective    Guevara Meza is a 60 y.o. overweight white male nonsmoker with HTN, Allergies, [employer] (Hearing aides),Neuropathic pain,  H/O Prostate cancer S/P Prostatectomy at [facility]. See PMH/PSH. Pt Calls to discuss acute health problems, Tx and F/U plans.     ' Was seen at [facility] for Cough and congestion, Taking Z-pack and steroids. Still coughing bad, Tessalon not helping. Thrush back in mouth'.        Cough   This is a new problem. The current episode started 1 to 4 weeks ago. The problem has been waxing and waning. The problem occurs hourly. The cough is productive of sputum. Associated symptoms include nasal congestion and a sore throat. Pertinent negatives include no ear pain, eye redness, postnasal drip, rhinorrhea or wheezing. He has tried oral steroids (Z-pack, Tessalon pearles) for the symptoms. The treatment provided mild relief. His past medical history is significant for bronchitis and environmental allergies.        The following portions of the patient's history were reviewed and updated as appropriate: allergies, current medications, past family history, past medical history, past social history, past surgical history and problem list.    Review of Systems   Constitutional: Negative for activity change, appetite change and unexpected weight change.   HENT: Positive for hearing loss, sore throat and tinnitus. Negative for dental problem, drooling, ear discharge, ear pain, facial swelling, mouth sores, nosebleeds, postnasal drip, rhinorrhea, sinus pressure, sinus pain, sneezing, trouble swallowing and voice change.    Eyes: Positive for itching. Negative for photophobia, pain, discharge, redness and visual disturbance.        Wears glasses     Eye exams with Dr. Glaser   Respiratory: Positive for cough. Negative for apnea, choking, chest tightness, wheezing and stridor.    Cardiovascular: Negative for palpitations and leg swelling.   Gastrointestinal: Negative for abdominal distention, anal  bleeding, blood in stool, constipation, diarrhea and rectal pain.        Rectal stenosis   Endocrine: Negative for cold intolerance, heat intolerance, polydipsia, polyphagia and polyuria.   Genitourinary: Positive for enuresis. Negative for decreased urine volume, difficulty urinating, discharge, dysuria, flank pain, frequency, genital sores, hematuria, penile pain, penile swelling, scrotal swelling, testicular pain and urgency.        Small R hernia felt in scrotum at times, told OK    Musculoskeletal: Positive for back pain. Negative for gait problem and neck stiffness.        R foot, Plantar faciitis.  R calf pain.     L5 DDD  Occasional back pain more on R    R shoulder pain   Skin: Negative for color change, pallor and wound.        Eczema   Allergic/Immunologic: Positive for environmental allergies. Negative for food allergies and immunocompromised state.   Neurological: Positive for tremors. Negative for dizziness, seizures, syncope, facial asymmetry, speech difficulty and light-headedness.   Hematological: Negative for adenopathy. Bruises/bleeds easily.   Psychiatric/Behavioral: Positive for sleep disturbance. Negative for agitation, behavioral problems, confusion, decreased concentration, dysphoric mood, hallucinations, self-injury and suicidal ideas. The patient is not nervous/anxious and is not hyperactive.         Neurotin helps with pain and sleep,      Sleeps 5 hrs with med.        Objective   Physical Exam   Constitutional: He is oriented to person, place, and time.   Pulmonary/Chest: Effort normal.   Cough   Neurological: He is alert and oriented to person, place, and time.   Psychiatric: He has a normal mood and affect. His behavior is normal. Judgment and thought content normal.   Nursing note and vitals reviewed.      Assessment/Plan   Guevara was seen today for cough, fatigue and diarrhea.    Diagnoses and all orders for this visit:    Cough  -     guaiFENesin-codeine (GUAIFENESIN AC) 100-10 MG/5ML  liquid; Take 5 mL by mouth 3 (Three) Times a Day As Needed for Cough or Congestion.    Other orders  -     fluconazole (Diflucan) 100 MG tablet; Take 1 tablet by mouth Every Other Day.      Discussed health problems, meds, indications, tx plan, rationale. Discussed safety with controlled med. Discussed F/u plan.  This visit has been rescheduled as a phone visit to comply with patient safety concerns in accordance with CDC recommendations. Total time of discussion was 15 minutes.            This document has been electronically signed by Rd Pabon MD

## 2020-08-31 NOTE — PATIENT INSTRUCTIONS
Urinary Tract Infection, Adult    A urinary tract infection (UTI) is an infection of any part of the urinary tract. The urinary tract includes the kidneys, ureters, bladder, and urethra. These organs make, store, and get rid of urine in the body.  Your health care provider may use other names to describe the infection. An upper UTI affects the ureters and kidneys (pyelonephritis). A lower UTI affects the bladder (cystitis) and urethra (urethritis).  What are the causes?  Most urinary tract infections are caused by bacteria in your genital area, around the entrance to your urinary tract (urethra). These bacteria grow and cause inflammation of your urinary tract.  What increases the risk?  You are more likely to develop this condition if:  · You have a urinary catheter that stays in place (indwelling).  · You are not able to control when you urinate or have a bowel movement (you have incontinence).  · You are female and you:  ? Use a spermicide or diaphragm for birth control.  ? Have low estrogen levels.  ? Are pregnant.  · You have certain genes that increase your risk (genetics).  · You are sexually active.  · You take antibiotic medicines.  · You have a condition that causes your flow of urine to slow down, such as:  ? An enlarged prostate, if you are male.  ? Blockage in your urethra (stricture).  ? A kidney stone.  ? A nerve condition that affects your bladder control (neurogenic bladder).  ? Not getting enough to drink, or not urinating often.  · You have certain medical conditions, such as:  ? Diabetes.  ? A weak disease-fighting system (immunesystem).  ? Sickle cell disease.  ? Gout.  ? Spinal cord injury.  What are the signs or symptoms?  Symptoms of this condition include:  · Needing to urinate right away (urgently).  · Frequent urination or passing small amounts of urine frequently.  · Pain or burning with urination.  · Blood in the urine.  · Urine that smells bad or unusual.  · Trouble urinating.  · Cloudy  urine.  · Vaginal discharge, if you are female.  · Pain in the abdomen or the lower back.  You may also have:  · Vomiting or a decreased appetite.  · Confusion.  · Irritability or tiredness.  · A fever.  · Diarrhea.  The first symptom in older adults may be confusion. In some cases, they may not have any symptoms until the infection has worsened.  How is this diagnosed?  This condition is diagnosed based on your medical history and a physical exam. You may also have other tests, including:  · Urine tests.  · Blood tests.  · Tests for sexually transmitted infections (STIs).  If you have had more than one UTI, a cystoscopy or imaging studies may be done to determine the cause of the infections.  How is this treated?  Treatment for this condition includes:  · Antibiotic medicine.  · Over-the-counter medicines to treat discomfort.  · Drinking enough water to stay hydrated.  If you have frequent infections or have other conditions such as a kidney stone, you may need to see a health care provider who specializes in the urinary tract (urologist).  In rare cases, urinary tract infections can cause sepsis. Sepsis is a life-threatening condition that occurs when the body responds to an infection. Sepsis is treated in the hospital with IV antibiotics, fluids, and other medicines.  Follow these instructions at home:    Medicines  · Take over-the-counter and prescription medicines only as told by your health care provider.  · If you were prescribed an antibiotic medicine, take it as told by your health care provider. Do not stop using the antibiotic even if you start to feel better.  General instructions  · Make sure you:  ? Empty your bladder often and completely. Do not hold urine for long periods of time.  ? Empty your bladder after sex.  ? Wipe from front to back after a bowel movement if you are female. Use each tissue one time when you wipe.  · Drink enough fluid to keep your urine pale yellow.  · Keep all follow-up  visits as told by your health care provider. This is important.  Contact a health care provider if:  · Your symptoms do not get better after 1-2 days.  · Your symptoms go away and then return.  Get help right away if you have:  · Severe pain in your back or your lower abdomen.  · A fever.  · Nausea or vomiting.  Summary  · A urinary tract infection (UTI) is an infection of any part of the urinary tract, which includes the kidneys, ureters, bladder, and urethra.  · Most urinary tract infections are caused by bacteria in your genital area, around the entrance to your urinary tract (urethra).  · Treatment for this condition often includes antibiotic medicines.  · If you were prescribed an antibiotic medicine, take it as told by your health care provider. Do not stop using the antibiotic even if you start to feel better.  · Keep all follow-up visits as told by your health care provider. This is important.  This information is not intended to replace advice given to you by your health care provider. Make sure you discuss any questions you have with your health care provider.  Document Released: 09/27/2006 Document Revised: 12/05/2019 Document Reviewed: 06/27/2019  [employer] Patient Education © 2020 [employer]

## 2020-09-02 LAB — BACTERIA SPEC AEROBE CULT: ABNORMAL

## 2020-09-03 LAB — BACTERIA SPEC AEROBE CULT: ABNORMAL

## 2020-09-09 ENCOUNTER — TELEPHONE (OUTPATIENT)
Dept: FAMILY MEDICINE CLINIC | Facility: CLINIC | Age: 60
End: 2020-09-09

## 2020-09-09 DIAGNOSIS — Z87.440 HISTORY OF UTI: Primary | ICD-10-CM

## 2020-09-09 PROCEDURE — 81003 URINALYSIS AUTO W/O SCOPE: CPT | Performed by: NURSE PRACTITIONER

## 2020-09-09 NOTE — TELEPHONE ENCOUNTER
Pt states he wants to come in and have a urine repeated to make sure his UTI is gone. Pt would like to come up to walkin clinic possibly on nurse schedule just to have urine dipped while he is in office to make sure it is better. Please advise. I let him know that I would call him back and let him know what you advised.

## 2020-09-10 ENCOUNTER — CLINICAL SUPPORT (OUTPATIENT)
Dept: FAMILY MEDICINE CLINIC | Facility: CLINIC | Age: 60
End: 2020-09-10

## 2020-09-10 DIAGNOSIS — R30.0 DYSURIA: Primary | ICD-10-CM

## 2020-09-10 LAB
BILIRUB BLD-MCNC: NEGATIVE MG/DL
CLARITY, POC: CLEAR
COLOR UR: YELLOW
GLUCOSE UR STRIP-MCNC: NEGATIVE MG/DL
KETONES UR QL: NEGATIVE
LEUKOCYTE EST, POC: NEGATIVE
NITRITE UR-MCNC: NEGATIVE MG/ML
PH UR: 6 [PH] (ref 5–8)
PROT UR STRIP-MCNC: NEGATIVE MG/DL
RBC # UR STRIP: NEGATIVE /UL
SP GR UR: 1.01 (ref 1–1.03)
UROBILINOGEN UR QL: NORMAL

## 2020-09-17 ENCOUNTER — TELEPHONE (OUTPATIENT)
Dept: FAMILY MEDICINE CLINIC | Facility: CLINIC | Age: 60
End: 2020-09-17

## 2020-09-17 NOTE — TELEPHONE ENCOUNTER
PATIENT STATES HE HAS LOWER BACK PAIN AND HAS HAD 2 UTI INFECTIONS WITHIN A YEAR BUT STATES HE IS NOT SURE IF IT IS JUST HIM THINKING HE IS GETTING ANOTHER ONE     HE ALSO STATES HE HAS YELLOW ON THE BACK OF HIS TONGUE AND HAD THRUSH ABOUT 6 WEEKS AGO HE STATES IT DOES NOT HURT BUT WANTS TO STAY ON TOP OF THINGS    HE IS REQUESTING A LAB ORDER FOR HIS LABS TO BE DRAWN BEFORE HIS NEXT 3 MONTH FOLLOW UP AND TO POSSIBLY RESCHEDULE FOR THE FOLLOW UP TO BE SOONER THAN October     HE IS WANTING DR FRENCH TO ADVISE HIM  IF HE SHOULD BE SEEN SOONER DUE TO HIS SYMPTOMS      GOOD CONTACT   [phone] (H)      [facility] - [place], [place] - [address] - [phone]  - [phone] FX

## 2020-09-18 NOTE — TELEPHONE ENCOUNTER
Tried calling to let patient know that his provider does not have anything available.  Patient can be seen in the walk-in clinic.  HUB to read

## 2020-09-18 NOTE — TELEPHONE ENCOUNTER
PATIENT CALLED BACK AND AT THIS TIME ISN'T HAVING ANY BACK PAIN ANYMORE MAYBE IT WAS JUST HIM SLEEPING WRONG. OFFERED WALK IN AS OF NOW DECLINED.

## 2020-10-15 ENCOUNTER — LAB (OUTPATIENT)
Dept: LAB | Facility: HOSPITAL | Age: 60
End: 2020-10-15

## 2020-10-15 ENCOUNTER — OFFICE VISIT (OUTPATIENT)
Dept: FAMILY MEDICINE CLINIC | Facility: CLINIC | Age: 60
End: 2020-10-15

## 2020-10-15 VITALS
HEIGHT: 72 IN | SYSTOLIC BLOOD PRESSURE: 126 MMHG | OXYGEN SATURATION: 96 % | WEIGHT: 204 LBS | BODY MASS INDEX: 27.63 KG/M2 | HEART RATE: 63 BPM | TEMPERATURE: 97.8 F | DIASTOLIC BLOOD PRESSURE: 82 MMHG

## 2020-10-15 DIAGNOSIS — C61 PROSTATE CANCER (HCC): ICD-10-CM

## 2020-10-15 DIAGNOSIS — Z12.5 PROSTATE CANCER SCREENING: ICD-10-CM

## 2020-10-15 DIAGNOSIS — M79.604 RIGHT LEG PAIN: ICD-10-CM

## 2020-10-15 DIAGNOSIS — M79.2 NEUROPATHIC PAIN: ICD-10-CM

## 2020-10-15 DIAGNOSIS — R39.81 FUNCTIONAL URINARY INCONTINENCE: ICD-10-CM

## 2020-10-15 DIAGNOSIS — M79.671 BILATERAL LEG AND FOOT PAIN: ICD-10-CM

## 2020-10-15 DIAGNOSIS — G47.09 OTHER INSOMNIA: ICD-10-CM

## 2020-10-15 DIAGNOSIS — M79.604 BILATERAL LEG AND FOOT PAIN: ICD-10-CM

## 2020-10-15 DIAGNOSIS — M79.605 BILATERAL LEG AND FOOT PAIN: ICD-10-CM

## 2020-10-15 DIAGNOSIS — Z90.79 H/O PROSTATECTOMY: ICD-10-CM

## 2020-10-15 DIAGNOSIS — R12 HEART BURN: ICD-10-CM

## 2020-10-15 DIAGNOSIS — J34.9 SINUS PROBLEM: ICD-10-CM

## 2020-10-15 DIAGNOSIS — I10 ESSENTIAL HYPERTENSION: ICD-10-CM

## 2020-10-15 DIAGNOSIS — M79.672 BILATERAL LEG AND FOOT PAIN: ICD-10-CM

## 2020-10-15 DIAGNOSIS — E78.5 HYPERLIPIDEMIA, UNSPECIFIED HYPERLIPIDEMIA TYPE: ICD-10-CM

## 2020-10-15 DIAGNOSIS — Z00.00 PREVENTATIVE HEALTH CARE: ICD-10-CM

## 2020-10-15 DIAGNOSIS — J30.2 SEASONAL ALLERGIES: ICD-10-CM

## 2020-10-15 LAB
ALBUMIN SERPL-MCNC: 4.4 G/DL (ref 3.5–5.2)
ALBUMIN/GLOB SERPL: 1.6 G/DL
ALP SERPL-CCNC: 78 U/L (ref 39–117)
ALT SERPL W P-5'-P-CCNC: 19 U/L (ref 1–41)
ANION GAP SERPL CALCULATED.3IONS-SCNC: 8.8 MMOL/L (ref 5–15)
AST SERPL-CCNC: 17 U/L (ref 1–40)
BACTERIA UR QL AUTO: NORMAL /HPF
BASOPHILS # BLD AUTO: 0.08 10*3/MM3 (ref 0–0.2)
BASOPHILS NFR BLD AUTO: 1 % (ref 0–1.5)
BILIRUB SERPL-MCNC: 0.4 MG/DL (ref 0–1.2)
BILIRUB UR QL STRIP: NEGATIVE
BUN SERPL-MCNC: 11 MG/DL (ref 8–23)
BUN/CREAT SERPL: 12.1 (ref 7–25)
CALCIUM SPEC-SCNC: 9.4 MG/DL (ref 8.6–10.5)
CHLORIDE SERPL-SCNC: 106 MMOL/L (ref 98–107)
CHOLEST SERPL-MCNC: 191 MG/DL (ref 0–200)
CLARITY UR: CLEAR
CO2 SERPL-SCNC: 28.2 MMOL/L (ref 22–29)
COLOR UR: YELLOW
CREAT SERPL-MCNC: 0.91 MG/DL (ref 0.76–1.27)
DEPRECATED RDW RBC AUTO: 42.7 FL (ref 37–54)
EOSINOPHIL # BLD AUTO: 0.38 10*3/MM3 (ref 0–0.4)
EOSINOPHIL NFR BLD AUTO: 4.5 % (ref 0.3–6.2)
ERYTHROCYTE [DISTWIDTH] IN BLOOD BY AUTOMATED COUNT: 13.1 % (ref 12.3–15.4)
GFR SERPL CREATININE-BSD FRML MDRD: 84 ML/MIN/1.73
GLOBULIN UR ELPH-MCNC: 2.8 GM/DL
GLUCOSE SERPL-MCNC: 91 MG/DL (ref 65–99)
GLUCOSE UR STRIP-MCNC: NEGATIVE MG/DL
HCT VFR BLD AUTO: 41.4 % (ref 37.5–51)
HDLC SERPL-MCNC: 70 MG/DL (ref 40–60)
HGB BLD-MCNC: 14.3 G/DL (ref 13–17.7)
HGB UR QL STRIP.AUTO: ABNORMAL
HYALINE CASTS UR QL AUTO: NORMAL /LPF
IMM GRANULOCYTES # BLD AUTO: 0.02 10*3/MM3 (ref 0–0.05)
IMM GRANULOCYTES NFR BLD AUTO: 0.2 % (ref 0–0.5)
KETONES UR QL STRIP: NEGATIVE
LDLC SERPL CALC-MCNC: 110 MG/DL (ref 0–100)
LDLC/HDLC SERPL: 1.57 {RATIO}
LEUKOCYTE ESTERASE UR QL STRIP.AUTO: NEGATIVE
LYMPHOCYTES # BLD AUTO: 1.8 10*3/MM3 (ref 0.7–3.1)
LYMPHOCYTES NFR BLD AUTO: 21.4 % (ref 19.6–45.3)
MCH RBC QN AUTO: 31.2 PG (ref 26.6–33)
MCHC RBC AUTO-ENTMCNC: 34.5 G/DL (ref 31.5–35.7)
MCV RBC AUTO: 90.2 FL (ref 79–97)
MONOCYTES # BLD AUTO: 0.62 10*3/MM3 (ref 0.1–0.9)
MONOCYTES NFR BLD AUTO: 7.4 % (ref 5–12)
NEUTROPHILS NFR BLD AUTO: 5.52 10*3/MM3 (ref 1.7–7)
NEUTROPHILS NFR BLD AUTO: 65.5 % (ref 42.7–76)
NITRITE UR QL STRIP: NEGATIVE
NRBC BLD AUTO-RTO: 0.1 /100 WBC (ref 0–0.2)
PH UR STRIP.AUTO: 6 [PH] (ref 5–8)
PLATELET # BLD AUTO: 244 10*3/MM3 (ref 140–450)
PMV BLD AUTO: 10.5 FL (ref 6–12)
POTASSIUM SERPL-SCNC: 4.2 MMOL/L (ref 3.5–5.2)
PROT SERPL-MCNC: 7.2 G/DL (ref 6–8.5)
PROT UR QL STRIP: NEGATIVE
PSA SERPL-MCNC: <0.014 NG/ML (ref 0–4)
RBC # BLD AUTO: 4.59 10*6/MM3 (ref 4.14–5.8)
RBC # UR: NORMAL /HPF
REF LAB TEST METHOD: NORMAL
SODIUM SERPL-SCNC: 143 MMOL/L (ref 136–145)
SP GR UR STRIP: 1.01 (ref 1–1.03)
SQUAMOUS #/AREA URNS HPF: NORMAL /HPF
TRIGL SERPL-MCNC: 57 MG/DL (ref 0–150)
TSH SERPL DL<=0.05 MIU/L-ACNC: 2.05 UIU/ML (ref 0.27–4.2)
UROBILINOGEN UR QL STRIP: ABNORMAL
VLDLC SERPL-MCNC: 11 MG/DL (ref 5–40)
WBC # BLD AUTO: 8.42 10*3/MM3 (ref 3.4–10.8)
WBC UR QL AUTO: NORMAL /HPF

## 2020-10-15 PROCEDURE — G0103 PSA SCREENING: HCPCS

## 2020-10-15 PROCEDURE — 81001 URINALYSIS AUTO W/SCOPE: CPT

## 2020-10-15 PROCEDURE — 85025 COMPLETE CBC W/AUTO DIFF WBC: CPT

## 2020-10-15 PROCEDURE — 80053 COMPREHEN METABOLIC PANEL: CPT

## 2020-10-15 PROCEDURE — 80061 LIPID PANEL: CPT

## 2020-10-15 PROCEDURE — 84443 ASSAY THYROID STIM HORMONE: CPT

## 2020-10-15 PROCEDURE — 99214 OFFICE O/P EST MOD 30 MIN: CPT | Performed by: FAMILY MEDICINE

## 2020-10-15 RX ORDER — GABAPENTIN 300 MG/1
300 CAPSULE ORAL 3 TIMES DAILY
Qty: 90 CAPSULE | Refills: 3 | Status: SHIPPED | OUTPATIENT
Start: 2020-10-15 | End: 2021-01-27 | Stop reason: SDUPTHER

## 2020-10-15 NOTE — PROGRESS NOTES
Subjective   Guevara Meza is a 60 y.o. overweight white male nonsmoker with HTN, Allergies, [place] (Hearing aides),Neuropathic pain,  H/O Prostate cancer S/P Prostatectomy at [place]. See PMH/PSH. Pt presents to discuss health problems, Tx and F/U plans.    ' Would like to see Urology Dr. Hansen at [facility], having difficulty with urinary incontinence, after Prostatectomy. B/P has been OK.  Allergies have been flaring up of and on, causing sinus problems'.    Sinusitis  This is a new problem. The current episode started 1 to 4 weeks ago. The problem has been gradually improving since onset. There has been no fever. His pain is at a severity of 1/10. The pain is mild. Associated symptoms include coughing and sinus pressure. Pertinent negatives include no ear pain, sneezing or sore throat. Past treatments include antibiotics and acetaminophen (Antihistamine).   Hypertension  This is a chronic problem. The current episode started more than 1 year ago. The problem is controlled. Pertinent negatives include no palpitations. Agents associated with hypertension include decongestants and NSAIDs. Risk factors for coronary artery disease include male gender and dyslipidemia. Past treatments include ACE inhibitors. Current antihypertension treatment includes ACE inhibitors. The current treatment provides significant improvement.   Cancer  This is a chronic (prostate cancer, S/P Prostatectomy) problem. The current episode started more than 1 year ago. The problem occurs daily. The problem has been gradually improving. Associated symptoms include coughing. Pertinent negatives include no sore throat. Rash: gu  Nothing aggravates the symptoms. Treatments tried: Surgery. The treatment provided significant relief.   Hyperlipidemia  This is a chronic problem. The current episode started more than 1 year ago. Recent lipid tests were reviewed and are variable. Current antihyperlipidemic treatment includes statins. The current treatment  provides significant improvement of lipids. Risk factors for coronary artery disease include dyslipidemia, hypertension and male sex.   Insomnia  This is a chronic problem. The current episode started more than 1 year ago. The problem occurs intermittently. Associated symptoms include coughing. Pertinent negatives include no sore throat. Rash: gu  Treatments tried: Benadryl, Neurontin. The treatment provided moderate relief.   Male  Problem  The patient's pertinent negatives include no penile discharge, penile pain, scrotal swelling or testicular pain. Primary symptoms comment: ED, Urinary incontinence.. This is a chronic problem. The current episode started more than 1 month ago. The problem occurs daily. The problem has been waxing and waning. Associated symptoms include coughing. Pertinent negatives include no constipation, diarrhea, dysuria, flank pain, frequency, sore throat or urgency. Rash: gu  Exacerbated by: S/P Prostatectomy for cancer. The treatment provided mild relief. (Prostate Cancer.)        The following portions of the patient's history were reviewed and updated as appropriate: allergies, current medications, past family history, past medical history, past social history, past surgical history and problem list.    Review of Systems   Constitutional: Negative for activity change, appetite change and unexpected weight change.   HENT: Positive for hearing loss, sinus pressure and tinnitus. Negative for dental problem, drooling, ear discharge, ear pain, facial swelling, mouth sores, nosebleeds, postnasal drip, rhinorrhea, sinus pain, sneezing, sore throat, trouble swallowing and voice change.    Eyes: Positive for itching. Negative for photophobia, pain, discharge, redness and visual disturbance.        Wears glasses     Eye exams with Dr. Glaser   Respiratory: Positive for cough. Negative for apnea, choking, chest tightness, wheezing and stridor.    Cardiovascular: Negative for palpitations and leg  swelling.   Gastrointestinal: Negative for abdominal distention, anal bleeding, blood in stool, constipation, diarrhea and rectal pain.        Rectal stenosis   Endocrine: Negative for cold intolerance, heat intolerance, polydipsia, polyphagia and polyuria.   Genitourinary: Positive for enuresis. Negative for decreased urine volume, difficulty urinating, discharge, dysuria, flank pain, frequency, genital sores, hematuria, penile pain, penile swelling, scrotal swelling, testicular pain and urgency.        Small R hernia felt in scrotum at times, told OK    Musculoskeletal: Positive for back pain. Negative for gait problem and neck stiffness.        R foot, Plantar faciitis.  R calf pain.     L5 DDD  Occasional back pain more on R    R shoulder pain   Skin: Negative for color change, pallor and wound. Rash: gu         Eczema   Allergic/Immunologic: Positive for environmental allergies. Negative for food allergies and immunocompromised state.   Neurological: Positive for tremors. Negative for dizziness, seizures, syncope, facial asymmetry, speech difficulty and light-headedness.   Hematological: Negative for adenopathy. Bruises/bleeds easily.   Psychiatric/Behavioral: Positive for sleep disturbance. Negative for agitation, behavioral problems, confusion, decreased concentration, dysphoric mood, hallucinations, self-injury and suicidal ideas. The patient has insomnia. The patient is not nervous/anxious and is not hyperactive.         Neurotin helps with pain and sleep,      Sleeps 5 hrs with med.        Objective   Physical Exam  Vitals signs and nursing note reviewed.   Constitutional:       General: He is not in acute distress.     Appearance: He is well-developed. He is not diaphoretic.   HENT:      Head: Normocephalic.      Right Ear: External ear normal.      Left Ear: External ear normal.      Nose: Nose normal.   Eyes:      General: No scleral icterus.        Right eye: No discharge.         Left eye: No  discharge.      Conjunctiva/sclera: Conjunctivae normal.      Pupils: Pupils are equal, round, and reactive to light.   Neck:      Thyroid: No thyromegaly.      Vascular: No JVD.      Trachea: No tracheal deviation.   Cardiovascular:      Rate and Rhythm: Normal rate.      Heart sounds: Normal heart sounds. No murmur. No friction rub. No gallop.    Pulmonary:      Effort: Pulmonary effort is normal. No respiratory distress.      Breath sounds: Normal breath sounds. No stridor. No wheezing or rales.   Chest:      Chest wall: No tenderness.   Abdominal:      General: Bowel sounds are normal. There is no distension.      Palpations: There is no mass.      Tenderness: There is no abdominal tenderness. There is no guarding or rebound.      Hernia: No hernia is present.   Musculoskeletal: Normal range of motion.         General: No tenderness or deformity.   Lymphadenopathy:      Cervical: No cervical adenopathy.   Skin:     General: Skin is warm and dry.      Coloration: Skin is not pale.      Findings: No erythema or rash.   Neurological:      Mental Status: He is alert and oriented to person, place, and time.      Cranial Nerves: No cranial nerve deficit.      Motor: No abnormal muscle tone.      Coordination: Coordination normal.      Deep Tendon Reflexes: Reflexes are normal and symmetric. Reflexes normal.   Psychiatric:         Behavior: Behavior normal.         Thought Content: Thought content normal.         Judgment: Judgment normal.         Assessment/Plan   Diagnoses and all orders for this visit:    1. Prostate cancer (CMS/HCC)  -     PSA SCREENING; Future    2. Prostate cancer screening  -     PSA SCREENING; Future    3. Essential hypertension  -     Comprehensive metabolic panel; Future  -     CBC & Differential; Future  -     TSH; Future  -     Urinalysis With Culture If Indicated -; Future  -     Lipid Panel; Future  -     ramipril (ALTACE) 10 MG capsule; Take 1 capsule by mouth Daily.  Dispense: 90  capsule; Refill: 1    4. H/O prostatectomy  -     PSA SCREENING; Future  -     Ambulatory Referral to Urology    5. Preventative health care  -     Comprehensive metabolic panel; Future  -     CBC & Differential; Future  -     TSH; Future  -     Urinalysis With Culture If Indicated -; Future  -     Lipid Panel; Future    6. Functional urinary incontinence  -     Ambulatory Referral to Urology    7. Right leg pain  -     gabapentin (NEURONTIN) 300 MG capsule; Take 1 capsule by mouth 3 (Three) Times a Day.  Dispense: 90 capsule; Refill: 3    8. Bilateral leg and foot pain  -     gabapentin (NEURONTIN) 300 MG capsule; Take 1 capsule by mouth 3 (Three) Times a Day.  Dispense: 90 capsule; Refill: 3    9. Sinus problem    10. Seasonal allergies    11. Hyperlipidemia, unspecified hyperlipidemia type    12. Other insomnia    13. Neuropathic pain  -     gabapentin (NEURONTIN) 300 MG capsule; Take 1 capsule by mouth 3 (Three) Times a Day.  Dispense: 90 capsule; Refill: 3    14. Heart burn  -     omeprazole (priLOSEC) 40 MG capsule; Take 1 capsule by mouth Daily.  Dispense: 30 capsule; Refill: 5    Discussed exam, health problems, meds, indications, tx plan, rationale. Discussed F/U with specialist. Discussed F/U here.         This document has been electronically signed by Rd Pabon MD

## 2020-10-18 PROBLEM — R12 HEART BURN: Status: ACTIVE | Noted: 2020-10-18

## 2020-10-18 PROBLEM — Z00.00 PREVENTATIVE HEALTH CARE: Status: ACTIVE | Noted: 2019-09-08

## 2020-10-18 RX ORDER — RAMIPRIL 10 MG/1
10 CAPSULE ORAL DAILY
Qty: 90 CAPSULE | Refills: 1 | Status: SHIPPED | OUTPATIENT
Start: 2020-10-18 | End: 2021-05-27 | Stop reason: SDUPTHER

## 2020-10-18 RX ORDER — OMEPRAZOLE 40 MG/1
40 CAPSULE, DELAYED RELEASE ORAL DAILY
Qty: 30 CAPSULE | Refills: 5 | Status: SHIPPED | OUTPATIENT
Start: 2020-10-18 | End: 2021-05-27 | Stop reason: SDUPTHER

## 2020-10-20 ENCOUNTER — TELEPHONE (OUTPATIENT)
Dept: FAMILY MEDICINE CLINIC | Facility: CLINIC | Age: 60
End: 2020-10-20

## 2020-10-20 NOTE — TELEPHONE ENCOUNTER
----- Message from Rd Pabon MD sent at 10/18/2020  7:18 PM CDT -----  Labs are good, will go over at next visit.

## 2020-10-29 ENCOUNTER — TELEPHONE (OUTPATIENT)
Dept: FAMILY MEDICINE CLINIC | Facility: CLINIC | Age: 60
End: 2020-10-29

## 2020-10-29 NOTE — TELEPHONE ENCOUNTER
Spoke with pt regarding lab results.   Pt would like to know next step would be in regards to the issue with his tongue. Please advise.

## 2020-10-29 NOTE — TELEPHONE ENCOUNTER
PATIENT CALLED AND WOULD LIKE A CALL BACK WITH HIS RESULTS FROM THE LABS HE HAD DONE ON 10/15/2020    GOOD CALLBACK;  [phone]

## 2020-10-30 NOTE — TELEPHONE ENCOUNTER
Will be to F/U with Dr. Solano. He will need to see it flaring up, maybe then he can figure out what's going on.

## 2020-11-03 ENCOUNTER — OFFICE VISIT (OUTPATIENT)
Dept: OTOLARYNGOLOGY | Facility: CLINIC | Age: 60
End: 2020-11-03

## 2020-11-03 VITALS — TEMPERATURE: 97.5 F | WEIGHT: 204 LBS | BODY MASS INDEX: 27.63 KG/M2 | HEIGHT: 72 IN

## 2020-11-03 DIAGNOSIS — B37.0 ORAL CANDIDA: Primary | ICD-10-CM

## 2020-11-03 PROCEDURE — 99213 OFFICE O/P EST LOW 20 MIN: CPT | Performed by: OTOLARYNGOLOGY

## 2020-11-07 NOTE — PROGRESS NOTES
Subjective   Guevara Meza is a 60 y.o. male.       History of Present Illness   Patient was seen previously with mouth irritation thought to be due to Candida.  I saw him in August and gave him nystatin and he said that helped but he has had recurrence of a bad taste in his mouth and burning of his tongue.  He was using Listerine but quit using this for 5 days ago.  He also intermittently takes oral prednisone for other medical problems.  Has not been on any oral antibiotics recently.      The following portions of the patient's history were reviewed and updated as appropriate: allergies, current medications, past family history, past medical history, past social history, past surgical history and problem list.     reports that he has never smoked. He has never used smokeless tobacco. He reports that he does not drink alcohol or use drugs.   Patient is not a tobacco user and has not been counseled for use of tobacco products      Review of Systems   Constitutional: Negative for fever.           Objective   Physical Exam  General: Well-developed well-nourished male in no acute distress.  Alert and oriented x3. Voice:Strong. Speech:Fluent  Ears: External ears no deformity, canals no discharge, tympanic membranes intact clear and mobile bilaterally.  Nose: Nares show no discharge mass polyp or purulence.  Boggy mucosa is present.  No gross external deformity.  Septum: To the right  Oral cavity: Lips and gums without lesions.  Tongue with some whitish exudate with a slight yellowish tinge consistent with Candida.  floor of mouth without lesions.  Parotid and submandibular ducts unobstructed.  No mucosal lesions on the buccal mucosa or vestibule of the mouth.  Pharynx: No erythema exudate mass or ulcer  Neck: No lymphadenopathy.  No thyromegaly.  Trachea and larynx midline.  No masses in the parotid or submandibular glands.    Assessment/Plan   Diagnoses and all orders for this visit:    1. Oral candida  (Primary)    Other orders  -     nystatin (MYCOSTATIN) 589422 UNIT/ML suspension; Take 5 mL by mouth 4 (Four) Times a Day. Swish and spit  Dispense: 200 mL; Refill: 1      Plan: This again looks like [place].  Will prescribe nystatin 10-day course with 1 refill.  Told him not to use the Listerine as this could be irritating.  Return in 1 month, call sooner for problems.

## 2020-12-01 ENCOUNTER — OFFICE VISIT (OUTPATIENT)
Dept: OTOLARYNGOLOGY | Facility: CLINIC | Age: 60
End: 2020-12-01

## 2020-12-01 VITALS — BODY MASS INDEX: 27.5 KG/M2 | TEMPERATURE: 96.8 F | OXYGEN SATURATION: 97 % | HEIGHT: 72 IN | WEIGHT: 203 LBS

## 2020-12-01 DIAGNOSIS — B37.0 ORAL CANDIDA: ICD-10-CM

## 2020-12-01 DIAGNOSIS — J30.9 ALLERGIC RHINITIS, UNSPECIFIED SEASONALITY, UNSPECIFIED TRIGGER: Primary | ICD-10-CM

## 2020-12-01 PROCEDURE — 99214 OFFICE O/P EST MOD 30 MIN: CPT | Performed by: OTOLARYNGOLOGY

## 2020-12-01 RX ORDER — AZELASTINE 1 MG/ML
2 SPRAY, METERED NASAL 2 TIMES DAILY
Qty: 30 ML | Refills: 11 | Status: SHIPPED | OUTPATIENT
Start: 2020-12-01 | End: 2022-02-22 | Stop reason: SDUPTHER

## 2020-12-01 NOTE — PROGRESS NOTES
Subjective   Guevara Meza is a 60 y.o. male.       History of Present Illness   Patient was seen previously with oral candidiasis thought to be due to use of prednisone.  Returns today stating that his mouth and throat are doing better but he did a lot of mowing over the weekend and has had a flareup of allergy symptoms including congestion and nonpurulent rhinorrhea and postnasal drainage.  He was previously on Astelin but does not have a prescription for that at this point.  Has been using nasal saline spray and over-the-counter antihistamines.  He states he continues to take prednisone intermittently about every other day.  He says this was prescribed by his dermatologist, Dr. Early, for eczema/psoriasis.  He says he also takes it when his allergies flareup.      The following portions of the patient's history were reviewed and updated as appropriate: allergies, current medications, past family history, past medical history, past social history, past surgical history and problem list.     reports that he has never smoked. He has never used smokeless tobacco. He reports that he does not drink alcohol or use drugs.   Patient is not a tobacco user and has not been counseled for use of tobacco products      Review of Systems   Constitutional: Negative for fever.   HENT: Positive for congestion.            Objective   Physical Exam  General: Well-developed well-nourished male in no acute distress.  Alert and oriented x3. Head: Normocephalic. Face: Symmetrical strength and appearance. PERRL. EOMI. Voice:Strong. Speech:Fluent  Ears: External ears no deformity, canals no discharge, tympanic membranes intact clear and mobile bilaterally.  Nose: Nares show pale boggy mucosa with mild clear discharge, no mass, polyp, purulence.  Septum to the right.  Oral cavity: Lips and gums without lesions.  Tongue shows no obvious Candida today.  Floor mouth without lesions.  Parotid and submandibular ducts unobstructed.  No  mucosal lesions on the buccal mucosa or vestibule of the mouth.  Pharynx: No erythema exudate mass or ulcer  Neck: No lymphadenopathy.  No thyromegaly.  Trachea and larynx midline.  No masses in the parotid or submandibular glands.      Assessment/Plan   Diagnoses and all orders for this visit:    1. Allergic rhinitis, unspecified seasonality, unspecified trigger (Primary)    2. Oral candida    Other orders  -     azelastine (ASTELIN) 0.1 % nasal spray; 2 sprays into the nostril(s) as directed by provider 2 (Two) Times a Day. Use in each nostril as directed  Dispense: 30 mL; Refill: 11      Plan: Will prescribe Astelin 2 sprays each nostril twice a day as needed for his allergies.  Encouraged him to continue using nasal saline spray as well.  I asked him to discuss his prednisone use with his dermatologist.  He takes oral prednisone approximately every other day and sometimes more often than that on a long-term basis and I told him that as long as he continues to do this he will likely have flareups of Candida and be at risk for other complications from prednisone use.  See me again in 6 months, call sooner for problems.

## 2021-01-27 ENCOUNTER — OFFICE VISIT (OUTPATIENT)
Dept: FAMILY MEDICINE CLINIC | Facility: CLINIC | Age: 61
End: 2021-01-27

## 2021-01-27 VITALS
OXYGEN SATURATION: 98 % | DIASTOLIC BLOOD PRESSURE: 78 MMHG | BODY MASS INDEX: 27.44 KG/M2 | HEART RATE: 87 BPM | SYSTOLIC BLOOD PRESSURE: 126 MMHG | TEMPERATURE: 97.8 F | WEIGHT: 202.6 LBS | HEIGHT: 72 IN

## 2021-01-27 DIAGNOSIS — M79.2 NEUROPATHIC PAIN: ICD-10-CM

## 2021-01-27 DIAGNOSIS — M79.671 BILATERAL LEG AND FOOT PAIN: ICD-10-CM

## 2021-01-27 DIAGNOSIS — R39.81 FUNCTIONAL URINARY INCONTINENCE: ICD-10-CM

## 2021-01-27 DIAGNOSIS — I10 ESSENTIAL HYPERTENSION: ICD-10-CM

## 2021-01-27 DIAGNOSIS — M79.605 BILATERAL LEG AND FOOT PAIN: ICD-10-CM

## 2021-01-27 DIAGNOSIS — M79.604 BILATERAL LEG AND FOOT PAIN: ICD-10-CM

## 2021-01-27 DIAGNOSIS — J30.2 SEASONAL ALLERGIES: ICD-10-CM

## 2021-01-27 DIAGNOSIS — Z90.79 H/O PROSTATECTOMY: ICD-10-CM

## 2021-01-27 DIAGNOSIS — M79.604 RIGHT LEG PAIN: ICD-10-CM

## 2021-01-27 DIAGNOSIS — N52.31 ERECTILE DYSFUNCTION AFTER RADICAL PROSTATECTOMY: ICD-10-CM

## 2021-01-27 DIAGNOSIS — M79.672 BILATERAL LEG AND FOOT PAIN: ICD-10-CM

## 2021-01-27 DIAGNOSIS — E78.5 HYPERLIPIDEMIA, UNSPECIFIED HYPERLIPIDEMIA TYPE: Primary | ICD-10-CM

## 2021-01-27 PROCEDURE — 99214 OFFICE O/P EST MOD 30 MIN: CPT | Performed by: FAMILY MEDICINE

## 2021-01-27 RX ORDER — GABAPENTIN 300 MG/1
300 CAPSULE ORAL 3 TIMES DAILY
Qty: 90 CAPSULE | Refills: 3 | Status: SHIPPED | OUTPATIENT
Start: 2021-01-27 | End: 2021-05-27 | Stop reason: SDUPTHER

## 2021-01-27 RX ORDER — SILDENAFIL 100 MG/1
100 TABLET, FILM COATED ORAL DAILY PRN
COMMUNITY
Start: 2020-12-09 | End: 2021-05-27 | Stop reason: ALTCHOICE

## 2021-01-27 NOTE — PROGRESS NOTES
Subjective   Guevara Meza is a 60 y.o. overweight white male nonsmoker with HTN, Allergies, [place] (Hearing aides),Neuropathic pain,  H/O Prostate cancer S/P Prostatectomy at [place]. See PMH/PSH. Pt presents to discuss health problems, Tx and F/U plans.    ' Seeing Urology, said could possibly be candidate for urinary incontinence device, not sure ready for another surgery. Didn't tolerate Viagra given here, have been changed to Cialis there. B/P has been OK. Allergies have been OK. Feet remain painful'.       Hypertension  This is a chronic problem. The current episode started more than 1 year ago. The problem is controlled. Pertinent negatives include no palpitations. Agents associated with hypertension include decongestants and NSAIDs. Risk factors for coronary artery disease include male gender and dyslipidemia. Past treatments include ACE inhibitors. Current antihypertension treatment includes ACE inhibitors. The current treatment provides significant improvement.   Cancer  This is a chronic (prostate cancer, S/P Prostatectomy) problem. The current episode started more than 1 year ago. The problem occurs daily. The problem has been gradually improving. Associated symptoms include coughing. Pertinent negatives include no sore throat. Rash: gu  Nothing aggravates the symptoms. Treatments tried: Surgery. The treatment provided significant relief.   Hyperlipidemia  This is a chronic problem. The current episode started more than 1 year ago. Recent lipid tests were reviewed and are variable. Current antihyperlipidemic treatment includes statins. The current treatment provides significant improvement of lipids. Risk factors for coronary artery disease include dyslipidemia, hypertension and male sex.   Insomnia  This is a chronic problem. The current episode started more than 1 year ago. The problem occurs intermittently. Associated symptoms include coughing. Pertinent negatives include no sore throat. Rash: gu   Treatments tried: Benadryl, Neurontin. The treatment provided moderate relief.   Male  Problem  The patient's pertinent negatives include no penile discharge, penile pain, scrotal swelling or testicular pain. Primary symptoms comment: ED, Urinary incontinence.. This is a chronic problem. The current episode started more than 1 month ago. The problem occurs daily. The problem has been waxing and waning. Associated symptoms include coughing. Pertinent negatives include no constipation, diarrhea, dysuria, flank pain, frequency, sore throat or urgency. Rash: gu  Exacerbated by: S/P Prostatectomy for cancer. The treatment provided mild relief. (Prostate Cancer.)   Pain  This is a chronic problem. The current episode started more than 1 year ago. The problem occurs daily. The problem has been waxing and waning. Associated symptoms include coughing. Pertinent negatives include no sore throat. Rash: gu  Associated symptoms comments: Neuropathic foot and leg pain. The symptoms are aggravated by standing and walking. Treatments tried: shoe inserts. The treatment provided significant relief.        The following portions of the patient's history were reviewed and updated as appropriate: allergies, current medications, past family history, past medical history, past social history, past surgical history and problem list.    Review of Systems   Constitutional: Negative for activity change, appetite change and unexpected weight change.   HENT: Positive for hearing loss, sinus pressure and tinnitus. Negative for dental problem, drooling, ear discharge, ear pain, facial swelling, mouth sores, nosebleeds, postnasal drip, rhinorrhea, sinus pain, sneezing, sore throat, trouble swallowing and voice change.    Eyes: Positive for itching. Negative for photophobia, pain, discharge, redness and visual disturbance.        Wears glasses     Eye exams with Dr. Glaser   Respiratory: Positive for cough. Negative for apnea, choking, chest  tightness, wheezing and stridor.    Cardiovascular: Negative for palpitations and leg swelling.   Gastrointestinal: Negative for abdominal distention, anal bleeding, blood in stool, constipation, diarrhea and rectal pain.        Rectal stenosis   Endocrine: Negative for cold intolerance, heat intolerance, polydipsia, polyphagia and polyuria.   Genitourinary: Positive for enuresis. Negative for decreased urine volume, difficulty urinating, discharge, dysuria, flank pain, frequency, genital sores, hematuria, penile pain, penile swelling, scrotal swelling, testicular pain and urgency.        Small R hernia felt in scrotum at times, told OK    Musculoskeletal: Positive for back pain. Negative for gait problem and neck stiffness.        R foot, Plantar faciitis.  R calf pain.     L5 DDD  Occasional back pain more on R    R shoulder pain   Skin: Negative for color change, pallor and wound. Rash: gu         Eczema   Allergic/Immunologic: Positive for environmental allergies. Negative for food allergies and immunocompromised state.   Neurological: Positive for tremors. Negative for dizziness, seizures, syncope, facial asymmetry, speech difficulty and light-headedness.   Hematological: Negative for adenopathy. Bruises/bleeds easily.   Psychiatric/Behavioral: Positive for sleep disturbance. Negative for agitation, behavioral problems, confusion, decreased concentration, dysphoric mood, hallucinations, self-injury and suicidal ideas. The patient has insomnia. The patient is not nervous/anxious and is not hyperactive.         Neurotin helps with pain and sleep,      Sleeps 5 hrs with med.        Objective   Physical Exam  Vitals signs and nursing note reviewed.   Constitutional:       General: He is not in acute distress.     Appearance: Normal appearance. He is well-developed. He is not diaphoretic.   HENT:      Head: Normocephalic.      Right Ear: Tympanic membrane, ear canal and external ear normal. There is no impacted  cerumen.      Left Ear: Tympanic membrane, ear canal and external ear normal. There is no impacted cerumen.      Nose: Nose normal.      Mouth/Throat:      Mouth: Mucous membranes are moist.   Eyes:      General: No scleral icterus.        Right eye: No discharge.         Left eye: No discharge.      Conjunctiva/sclera: Conjunctivae normal.      Pupils: Pupils are equal, round, and reactive to light.   Neck:      Thyroid: No thyromegaly.      Vascular: No JVD.      Trachea: No tracheal deviation.   Cardiovascular:      Rate and Rhythm: Normal rate.      Heart sounds: Normal heart sounds. No murmur. No friction rub. No gallop.    Pulmonary:      Effort: Pulmonary effort is normal. No respiratory distress.      Breath sounds: Normal breath sounds. No stridor. No wheezing or rales.   Chest:      Chest wall: No tenderness.   Abdominal:      General: Bowel sounds are normal. There is no distension.      Palpations: There is no mass.      Tenderness: There is no abdominal tenderness. There is no guarding or rebound.      Hernia: No hernia is present.   Musculoskeletal:         General: Tenderness present. No deformity.      Comments: WB 4 with ROM shoulders.  WB 2 with flexion, extension of feet.   Lymphadenopathy:      Cervical: No cervical adenopathy.   Skin:     General: Skin is warm and dry.      Coloration: Skin is not pale.      Findings: No erythema or rash.   Neurological:      Mental Status: He is alert and oriented to person, place, and time.      Cranial Nerves: No cranial nerve deficit.      Motor: No abnormal muscle tone.      Coordination: Coordination normal.      Deep Tendon Reflexes: Reflexes are normal and symmetric. Reflexes normal.   Psychiatric:         Mood and Affect: Mood normal.         Behavior: Behavior normal.         Thought Content: Thought content normal.         Judgment: Judgment normal.         Assessment/Plan   Diagnoses and all orders for this visit:    1. Hyperlipidemia, unspecified  hyperlipidemia type (Primary)    2. Right leg pain  -     gabapentin (NEURONTIN) 300 MG capsule; Take 1 capsule by mouth 3 (Three) Times a Day.  Dispense: 90 capsule; Refill: 3    3. Bilateral leg and foot pain  -     gabapentin (NEURONTIN) 300 MG capsule; Take 1 capsule by mouth 3 (Three) Times a Day.  Dispense: 90 capsule; Refill: 3    4. Neuropathic pain  -     gabapentin (NEURONTIN) 300 MG capsule; Take 1 capsule by mouth 3 (Three) Times a Day.  Dispense: 90 capsule; Refill: 3    5. H/O prostatectomy    6. Essential hypertension    7. Functional urinary incontinence    8. Erectile dysfunction after radical prostatectomy    9. Seasonal allergies    Discussed exam, health problems, medications indications treatment plan rationale.  James reviewed, discussed safety with controlled med.  Discussed follow-up with specialist.  Reviewed past labs with patient.  Discussed follow-up plan here.        This document has been electronically signed by Rd Pabon MD

## 2021-05-10 ENCOUNTER — OFFICE VISIT (OUTPATIENT)
Dept: FAMILY MEDICINE CLINIC | Facility: CLINIC | Age: 61
End: 2021-05-10

## 2021-05-10 VITALS
RESPIRATION RATE: 22 BRPM | TEMPERATURE: 98 F | SYSTOLIC BLOOD PRESSURE: 124 MMHG | HEIGHT: 72 IN | BODY MASS INDEX: 27.41 KG/M2 | HEART RATE: 72 BPM | OXYGEN SATURATION: 99 % | WEIGHT: 202.4 LBS | DIASTOLIC BLOOD PRESSURE: 68 MMHG

## 2021-05-10 DIAGNOSIS — J01.10 ACUTE FRONTAL SINUSITIS, RECURRENCE NOT SPECIFIED: Primary | ICD-10-CM

## 2021-05-10 DIAGNOSIS — K08.89 PAIN, DENTAL: ICD-10-CM

## 2021-05-10 PROCEDURE — 99214 OFFICE O/P EST MOD 30 MIN: CPT | Performed by: NURSE PRACTITIONER

## 2021-05-10 RX ORDER — CLINDAMYCIN HYDROCHLORIDE 300 MG/1
300 CAPSULE ORAL 4 TIMES DAILY
Qty: 40 CAPSULE | Refills: 0 | Status: SHIPPED | OUTPATIENT
Start: 2021-05-10 | End: 2021-05-27

## 2021-05-10 RX ORDER — VITAMIN E 268 MG
CAPSULE ORAL
COMMUNITY
End: 2022-09-27

## 2021-05-10 NOTE — PROGRESS NOTES
"Chief Complaint  POssible sinus infection    Subjective          Guevara Meza presents to [facility]    [facility]    PCP: Dr. Pabon    CC: \"sinus infection and possible dental abscess\"    Hasn't seen Dentist in over a year, since prior to Covid.  Reports he called and scheduled upcoming appointment this AM.     Scheduled for a colonoscopy later this week.      Sinus Problem  This is a new problem. Episode onset: x 2 weeks. The problem has been gradually worsening since onset. There has been no fever. He is experiencing no pain. Associated symptoms include congestion, headaches ( frontal), sinus pressure (frontal) and a sore throat (scratchy). Pertinent negatives include no chills, coughing, diaphoresis, ear pain, hoarse voice, neck pain, shortness of breath, sneezing or swollen glands. Treatments tried: nasal irrigations. The treatment provided mild relief.   Dental Pain   This is a new problem. The current episode started yesterday. The problem occurs daily. The problem has been waxing and waning. The pain is mild. Associated symptoms include sinus pressure (frontal). Pertinent negatives include no difficulty swallowing, facial pain, fever, oral bleeding or thermal sensitivity. He has tried nothing for the symptoms. The treatment provided no relief.       Review of Systems   Constitutional: Negative for appetite change, chills, diaphoresis and fever.   HENT: Positive for congestion, postnasal drip, sinus pressure (frontal), sinus pain (frontal) and sore throat (scratchy). Negative for ear discharge, ear pain, hoarse voice, rhinorrhea, sneezing and trouble swallowing.    Eyes: Negative.    Respiratory: Negative.  Negative for cough and shortness of breath.    Cardiovascular: Negative.    Gastrointestinal: Negative.    Genitourinary: Negative for difficulty urinating and dysuria.   Musculoskeletal: Negative for neck pain.   Skin: Negative.    Neurological: " "Positive for headaches ( frontal). Negative for dizziness.        Objective   Vital Signs:   /68 (BP Location: Left arm, Patient Position: Sitting, Cuff Size: Adult)   Pulse 72   Temp 98 °F (36.7 °C)   Resp 22   Ht 182.9 cm (72\")   Wt 91.8 kg (202 lb 6.4 oz)   SpO2 99%   BMI 27.45 kg/m²       Physical Exam  Vitals and nursing note reviewed.   Constitutional:       General: He is not in acute distress.     Appearance: Normal appearance. He is not ill-appearing.   HENT:      Head: Normocephalic and atraumatic.      Right Ear: Ear canal normal. Decreased hearing (hearing aid present, removed for exam) noted.      Left Ear: Ear canal normal. Decreased hearing (hearing aid present, removed for exam) noted.      Nose: Congestion present.      Right Sinus: Frontal sinus tenderness present. No maxillary sinus tenderness.      Left Sinus: Frontal sinus tenderness present. No maxillary sinus tenderness.      Mouth/Throat:      Mouth: Mucous membranes are moist.      Dentition: Dental tenderness and gingival swelling ( mild) present.      Pharynx: Posterior oropharyngeal erythema ( mild injection with moderate PND) present. No pharyngeal swelling or oropharyngeal exudate.     Eyes:      General:         Right eye: No discharge.         Left eye: No discharge.      Conjunctiva/sclera: Conjunctivae normal.   Cardiovascular:      Rate and Rhythm: Normal rate and regular rhythm.   Pulmonary:      Effort: Pulmonary effort is normal. No respiratory distress.      Breath sounds: Normal breath sounds. No wheezing, rhonchi or rales.   Musculoskeletal:      Cervical back: Neck supple. No tenderness.   Lymphadenopathy:      Cervical: No cervical adenopathy.   Skin:     General: Skin is warm and dry.   Neurological:      General: No focal deficit present.      Mental Status: He is alert and oriented to person, place, and time.          Result Review :                 Assessment and Plan    Diagnoses and all orders for this " visit:    1. Acute frontal sinusitis, recurrence not specified (Primary)  -     clindamycin (Cleocin) 300 MG capsule; Take 1 capsule by mouth 4 (Four) Times a Day.  Dispense: 40 capsule; Refill: 0    2. Pain, dental  -     clindamycin (Cleocin) 300 MG capsule; Take 1 capsule by mouth 4 (Four) Times a Day.  Dispense: 40 capsule; Refill: 0      Push fluids  Rest  Tylenol as needed  Rx for Cleocin provided  Encouraged peroxide or listerine oral rinses  May continue with nasal irrigations  Declines steroid injection for sinusitis today    Keep f/u with Dentist as scheduled    See PCP or RTC if symptoms persist/worsen  See PCP for routine f/u visit and management of chronic medical conditions      This document has been electronically signed by RON Stallworth on May 10, 2021 10:34 CDT,.    I spent 30 minutes caring for Guevara on this date of service. This time includes time spent by me in the following activities:preparing for the visit , exam, counseling, ordering medications and documentation.

## 2021-05-10 NOTE — PATIENT INSTRUCTIONS
Sinusitis, Adult  Sinusitis is inflammation of your sinuses. Sinuses are hollow spaces in the bones around your face. Your sinuses are located:  · Around your eyes.  · In the middle of your forehead.  · Behind your nose.  · In your cheekbones.  Mucus normally drains out of your sinuses. When your nasal tissues become inflamed or swollen, mucus can become trapped or blocked. This allows bacteria, viruses, and fungi to grow, which leads to infection. Most infections of the sinuses are caused by a virus.  Sinusitis can develop quickly. It can last for up to 4 weeks (acute) or for more than 12 weeks (chronic). Sinusitis often develops after a cold.  What are the causes?  This condition is caused by anything that creates swelling in the sinuses or stops mucus from draining. This includes:  · Allergies.  · Asthma.  · Infection from bacteria or viruses.  · Deformities or blockages in your nose or sinuses.  · Abnormal growths in the nose (nasal polyps).  · Pollutants, such as chemicals or irritants in the air.  · Infection from fungi (rare).  What increases the risk?  You are more likely to develop this condition if you:  · Have a weak body defense system (immune system).  · Do a lot of swimming or diving.  · Overuse nasal sprays.  · Smoke.  What are the signs or symptoms?  The main symptoms of this condition are pain and a feeling of pressure around the affected sinuses. Other symptoms include:  · Stuffy nose or congestion.  · Thick drainage from your nose.  · Swelling and warmth over the affected sinuses.  · Headache.  · Upper toothache.  · A cough that may get worse at night.  · Extra mucus that collects in the throat or the back of the nose (postnasal drip).  · Decreased sense of smell and taste.  · Fatigue.  · A fever.  · Sore throat.  · Bad breath.  How is this diagnosed?  This condition is diagnosed based on:  · Your symptoms.  · Your medical history.  · A physical exam.  · Tests to find out if your condition is  acute or chronic. This may include:  ? Checking your nose for nasal polyps.  ? Viewing your sinuses using a device that has a light (endoscope).  ? Testing for allergies or bacteria.  ? Imaging tests, such as an MRI or CT scan.  In rare cases, a bone biopsy may be done to rule out more serious types of fungal sinus disease.  How is this treated?  Treatment for sinusitis depends on the cause and whether your condition is chronic or acute.  · If caused by a virus, your symptoms should go away on their own within 10 days. You may be given medicines to relieve symptoms. They include:  ? Medicines that shrink swollen nasal passages (topical intranasal decongestants).  ? Medicines that treat allergies (antihistamines).  ? A spray that eases inflammation of the nostrils (topical intranasal corticosteroids).  ? Rinses that help get rid of thick mucus in your nose (nasal saline washes).  · If caused by bacteria, your health care provider may recommend waiting to see if your symptoms improve. Most bacterial infections will get better without antibiotic medicine. You may be given antibiotics if you have:  ? A severe infection.  ? A weak immune system.  · If caused by narrow nasal passages or nasal polyps, you may need to have surgery.  Follow these instructions at home:  Medicines  · Take, use, or apply over-the-counter and prescription medicines only as told by your health care provider. These may include nasal sprays.  · If you were prescribed an antibiotic medicine, take it as told by your health care provider. Do not stop taking the antibiotic even if you start to feel better.  Hydrate and humidify    · Drink enough fluid to keep your urine pale yellow. Staying hydrated will help to thin your mucus.  · Use a cool mist humidifier to keep the humidity level in your home above 50%.  · Inhale steam for 10-15 minutes, 3-4 times a day, or as told by your health care provider. You can do this in the bathroom while a hot shower is  running.  · Limit your exposure to cool or dry air.  Rest  · Rest as much as possible.  · Sleep with your head raised (elevated).  · Make sure you get enough sleep each night.  General instructions    · Apply a warm, moist washcloth to your face 3-4 times a day or as told by your health care provider. This will help with discomfort.  · Wash your hands often with soap and water to reduce your exposure to germs. If soap and water are not available, use hand .  · Do not smoke. Avoid being around people who are smoking (secondhand smoke).  · Keep all follow-up visits as told by your health care provider. This is important.  Contact a health care provider if:  · You have a fever.  · Your symptoms get worse.  · Your symptoms do not improve within 10 days.  Get help right away if:  · You have a severe headache.  · You have persistent vomiting.  · You have severe pain or swelling around your face or eyes.  · You have vision problems.  · You develop confusion.  · Your neck is stiff.  · You have trouble breathing.  Summary  · Sinusitis is soreness and inflammation of your sinuses. Sinuses are hollow spaces in the bones around your face.  · This condition is caused by nasal tissues that become inflamed or swollen. The swelling traps or blocks the flow of mucus. This allows bacteria, viruses, and fungi to grow, which leads to infection.  · If you were prescribed an antibiotic medicine, take it as told by your health care provider. Do not stop taking the antibiotic even if you start to feel better.  · Keep all follow-up visits as told by your health care provider. This is important.  This information is not intended to replace advice given to you by your health care provider. Make sure you discuss any questions you have with your health care provider.  Document Revised: 05/20/2019 Document Reviewed: 05/20/2019  [employer] Patient Education © 2021 [employer]      Dental Pain  Dental pain may be caused by many things,  including:  · Tooth decay (cavities or caries). Cavities expose the nerve of your tooth to air and to hot or cold temperatures. This can cause pain or discomfort.  · Abscess or infection. A dental abscess is a collection of pus from a bacterial infection in the inner part of the tooth (pulp). It usually occurs at the end of the root of a tooth.  · Injury.  · An unknown reason (idiopathic).  Your pain may be mild or severe. It may occur when you are:  · Chewing.  · Exposed to hot or cold temperatures.  · Eating or drinking sugary foods or beverages, such as soda or candy.  Your pain may be constant, or it may come and go without cause.  Follow these instructions at home:    Watch your dental pain for any changes. The following actions may help to lessen any discomfort that you are feeling:  Medicines  · Take over-the-counter and prescription medicines only as told by your health care provider.  · If you were prescribed an antibiotic medicine, take it as told by your health care provider. Do not stop taking the antibiotic even if you start to feel better.  Eating and drinking  · Avoid foods or drinks that cause you pain, such as:  ? Very hot or very cold foods or drinks.  ? Sweet or sugary foods or drinks.  Managing pain and swelling  · Apply ice to the painful area of your face:  ? Put ice in a plastic bag.  ? Place a towel between your skin and the bag.  ? Leave the ice on for 20 minutes, 2-3 times a day.  Brushing your teeth  · To keep your mouth and gums healthy, use fluoride toothpaste to brush your teeth twice a day. Floss once a day.  · Use a toothpaste made for sensitive teeth if directed by your health care provider.  · Brush your teeth with a soft-bristled toothbrush.  General instructions  · Do not apply heat to the outside of your face.  · Gargle with a salt-water mixture 3-4 times a day or as needed. To make a salt-water mixture, completely dissolve ½-1 tsp of salt in 1 cup of warm water.  · Keep all  follow-up visits as told by your health care provider. This is important.  · Apply ice to the outside of your jaw if there is swelling. Do not put ice directly on the skin.  Contact a health care provider if:  · Your pain is not controlled with medicines.  · Your symptoms get worse.  · You have new symptoms.  Get help right away if you:  · Are unable to open your mouth.  · Are having trouble breathing or swallowing.  · Have a fever.  · Notice that your face, neck, or jaw is swollen.  Summary  · Dental pain may be caused by many things, including tooth decay and infection.  · Your pain may be mild or severe.  · Take over-the-counter and prescription medicines only as told by your health care provider.  · Watch your dental pain for any changes. Let your health care provider know if symptoms get worse.  This information is not intended to replace advice given to you by your health care provider. Make sure you discuss any questions you have with your health care provider.  Document Revised: 04/14/2020 Document Reviewed: 11/08/2018  [employer] Patient Education © 2021 [employer]

## 2021-05-27 ENCOUNTER — OFFICE VISIT (OUTPATIENT)
Dept: FAMILY MEDICINE CLINIC | Facility: CLINIC | Age: 61
End: 2021-05-27

## 2021-05-27 VITALS
HEIGHT: 72 IN | TEMPERATURE: 97.7 F | DIASTOLIC BLOOD PRESSURE: 82 MMHG | HEART RATE: 77 BPM | SYSTOLIC BLOOD PRESSURE: 126 MMHG | BODY MASS INDEX: 27.5 KG/M2 | WEIGHT: 203 LBS | OXYGEN SATURATION: 97 %

## 2021-05-27 DIAGNOSIS — M79.671 BILATERAL LEG AND FOOT PAIN: ICD-10-CM

## 2021-05-27 DIAGNOSIS — M79.604 BILATERAL LEG AND FOOT PAIN: ICD-10-CM

## 2021-05-27 DIAGNOSIS — I10 ESSENTIAL HYPERTENSION: ICD-10-CM

## 2021-05-27 DIAGNOSIS — N39.45 CONTINUOUS LEAKAGE OF URINE: ICD-10-CM

## 2021-05-27 DIAGNOSIS — E78.5 HYPERLIPIDEMIA, UNSPECIFIED HYPERLIPIDEMIA TYPE: ICD-10-CM

## 2021-05-27 DIAGNOSIS — M79.605 BILATERAL LEG AND FOOT PAIN: ICD-10-CM

## 2021-05-27 DIAGNOSIS — R12 HEART BURN: ICD-10-CM

## 2021-05-27 DIAGNOSIS — M79.2 NEUROPATHIC PAIN: ICD-10-CM

## 2021-05-27 DIAGNOSIS — M79.604 RIGHT LEG PAIN: ICD-10-CM

## 2021-05-27 DIAGNOSIS — M79.672 BILATERAL LEG AND FOOT PAIN: ICD-10-CM

## 2021-05-27 PROCEDURE — 99214 OFFICE O/P EST MOD 30 MIN: CPT | Performed by: FAMILY MEDICINE

## 2021-05-27 RX ORDER — TADALAFIL 20 MG/1
TABLET ORAL
COMMUNITY
Start: 2021-05-18 | End: 2021-05-27

## 2021-05-27 RX ORDER — RAMIPRIL 10 MG/1
10 CAPSULE ORAL DAILY
Qty: 90 CAPSULE | Refills: 1 | Status: SHIPPED | OUTPATIENT
Start: 2021-05-27 | End: 2021-09-28 | Stop reason: SDUPTHER

## 2021-05-27 RX ORDER — OMEPRAZOLE 40 MG/1
40 CAPSULE, DELAYED RELEASE ORAL DAILY
Qty: 90 CAPSULE | Refills: 1 | Status: SHIPPED | OUTPATIENT
Start: 2021-05-27 | End: 2021-09-28 | Stop reason: SDUPTHER

## 2021-05-27 RX ORDER — GABAPENTIN 300 MG/1
300 CAPSULE ORAL 3 TIMES DAILY
Qty: 90 CAPSULE | Refills: 3 | Status: SHIPPED | OUTPATIENT
Start: 2021-05-27 | End: 2021-05-27 | Stop reason: SDUPTHER

## 2021-05-27 RX ORDER — ATORVASTATIN CALCIUM 20 MG/1
20 TABLET, FILM COATED ORAL EVERY OTHER DAY
Qty: 90 TABLET | Refills: 1 | Status: SHIPPED | OUTPATIENT
Start: 2021-05-27 | End: 2021-11-22 | Stop reason: SDUPTHER

## 2021-05-27 RX ORDER — TADALAFIL 5 MG/1
5 TABLET ORAL DAILY PRN
Qty: 30 TABLET | Refills: 5 | Status: SHIPPED | OUTPATIENT
Start: 2021-05-27 | End: 2021-05-27 | Stop reason: SDUPTHER

## 2021-05-27 RX ORDER — GABAPENTIN 300 MG/1
300 CAPSULE ORAL 3 TIMES DAILY
Qty: 90 CAPSULE | Refills: 3 | Status: SHIPPED | OUTPATIENT
Start: 2021-05-27 | End: 2021-09-28 | Stop reason: SDUPTHER

## 2021-05-27 RX ORDER — TADALAFIL 5 MG/1
5 TABLET ORAL DAILY PRN
Qty: 30 TABLET | Refills: 5 | Status: SHIPPED | OUTPATIENT
Start: 2021-05-27 | End: 2021-10-19

## 2021-06-01 ENCOUNTER — OFFICE VISIT (OUTPATIENT)
Dept: OTOLARYNGOLOGY | Facility: CLINIC | Age: 61
End: 2021-06-01

## 2021-06-01 VITALS — TEMPERATURE: 98.6 F | HEIGHT: 72 IN | BODY MASS INDEX: 27.63 KG/M2 | WEIGHT: 204 LBS

## 2021-06-01 DIAGNOSIS — J31.0 CHRONIC RHINITIS: Primary | ICD-10-CM

## 2021-06-01 DIAGNOSIS — J34.2 NASAL SEPTAL DEFORMITY: ICD-10-CM

## 2021-06-01 DIAGNOSIS — J35.8 CRYPTIC TONSIL: ICD-10-CM

## 2021-06-01 PROCEDURE — 99213 OFFICE O/P EST LOW 20 MIN: CPT | Performed by: OTOLARYNGOLOGY

## 2021-06-01 NOTE — PROGRESS NOTES
"Subjective   Guevara Meza is a 61 y.o. male.       History of Present Illness   Patient has been followed with chronic rhinitis.  Also had recurring oral Candida that I believed was due to use of oral prednisone, which at the time the patient was taking either daily or every other day because of eczema and psoriasis.  He returns today stating he has not had much trouble with Candida but he is also not been taking prednisone very often.  He is using Astelin as needed for his nasal symptoms and is also using nasal saline spray which he says seems to help.  Did have an acute upper respiratory infection a couple weeks ago.  Was given antibiotics and eventually resolved.  Patient says he was told he had \"tonsil stones\".      The following portions of the patient's history were reviewed and updated as appropriate: allergies, current medications, past family history, past medical history, past social history, past surgical history and problem list.     reports that he has never smoked. He has never used smokeless tobacco. He reports that he does not drink alcohol and does not use drugs.   Patient is not a tobacco user and has not been counseled for use of tobacco products      Review of Systems        Objective   Physical Exam  Ears: External ears no deformity, canals no discharge, tympanic membranes intact clear and mobile bilaterally.  Nares: Boggy mucosa septum to the right with no mass or polyp or purulence or blood  Oral cavity: Lips and gums without lesions.  Tongue and floor of mouth without lesions.  Parotid and submandibular ducts unobstructed.  No mucosal lesions on the buccal mucosa or vestibule of the mouth.  Specifically no evidence of Candida  Pharynx: No erythema exudate or mass.  Tonsils are 1+, cryptic, with no tonsillar debris today  Neck: No lymphadenopathy.  No thyromegaly.  Trachea and larynx midline.  No masses in the parotid or submandibular glands.      Assessment/Plan   Diagnoses and all " orders for this visit:    1. Chronic rhinitis (Primary)    2. Nasal septal deformity    3. Cryptic tonsil      Plan: Reassured the patient that tonsil stones were not pathologic and no specific treatment was indicated.  Advised him to avoid manipulation.  Gargle half-strength peroxide if needed.  For his nose he is to continue using as needed Astelin along with saline nasal spray as needed.  If symptoms remain well controlled return in 6 months but call sooner for problems.

## 2021-07-19 ENCOUNTER — OFFICE VISIT (OUTPATIENT)
Dept: FAMILY MEDICINE CLINIC | Facility: CLINIC | Age: 61
End: 2021-07-19

## 2021-07-19 VITALS
TEMPERATURE: 96.9 F | RESPIRATION RATE: 20 BRPM | HEIGHT: 72 IN | OXYGEN SATURATION: 100 % | SYSTOLIC BLOOD PRESSURE: 148 MMHG | BODY MASS INDEX: 27.29 KG/M2 | DIASTOLIC BLOOD PRESSURE: 90 MMHG | HEART RATE: 52 BPM | WEIGHT: 201.5 LBS

## 2021-07-19 DIAGNOSIS — J02.9 SORE THROAT: ICD-10-CM

## 2021-07-19 DIAGNOSIS — J40 BRONCHITIS: Primary | ICD-10-CM

## 2021-07-19 DIAGNOSIS — R09.82 PND (POST-NASAL DRIP): ICD-10-CM

## 2021-07-19 LAB
EXPIRATION DATE: NORMAL
INTERNAL CONTROL: NORMAL
Lab: NORMAL
S PYO AG THROAT QL: NEGATIVE

## 2021-07-19 PROCEDURE — 99213 OFFICE O/P EST LOW 20 MIN: CPT | Performed by: NURSE PRACTITIONER

## 2021-07-19 PROCEDURE — 87880 STREP A ASSAY W/OPTIC: CPT | Performed by: NURSE PRACTITIONER

## 2021-07-19 PROCEDURE — 96372 THER/PROPH/DIAG INJ SC/IM: CPT | Performed by: NURSE PRACTITIONER

## 2021-07-19 RX ORDER — TRIAMCINOLONE ACETONIDE 40 MG/ML
40 INJECTION, SUSPENSION INTRA-ARTICULAR; INTRAMUSCULAR ONCE
Status: COMPLETED | OUTPATIENT
Start: 2021-07-19 | End: 2021-07-19

## 2021-07-19 RX ORDER — AZITHROMYCIN 250 MG/1
TABLET, FILM COATED ORAL
Qty: 6 TABLET | Refills: 0 | Status: SHIPPED | OUTPATIENT
Start: 2021-07-19 | End: 2021-08-23

## 2021-07-19 RX ADMIN — TRIAMCINOLONE ACETONIDE 40 MG: 40 INJECTION, SUSPENSION INTRA-ARTICULAR; INTRAMUSCULAR at 12:59

## 2021-07-19 NOTE — PROGRESS NOTES
"Chief Complaint  sorethroat and cough/sinus congestion/drainage into chest    Subjective          Guevara Meza presents to [facility]    [facility]    PCP: Dr. Pabon    CC: \"possible sinus infection/sore throat\"    Denies fever, dyspnea, loss of smell/taste.  Hasn't had Covid vaccine.  No known exposures.          Illness  This is a new problem. Episode onset: x 2 weeks--started after bush hogging. The problem occurs daily. The problem has been gradually worsening. Associated symptoms include chest pain (mild tightness the last few days), coughing (occasional) and a sore throat. Pertinent negatives include no abdominal pain, anorexia, arthralgias, change in bowel habit, chills, congestion, diaphoresis, fatigue, fever, headaches, joint swelling, myalgias, nausea, neck pain, numbness, rash, swollen glands, urinary symptoms, vertigo, visual change, vomiting or weakness. Exacerbated by: seems worse in AM, productive cough of yellow to white mucus. Treatments tried: astelin, saline rinses. The treatment provided mild relief.       Review of Systems   Constitutional: Negative for appetite change, chills, diaphoresis, fatigue and fever.   HENT: Positive for postnasal drip and sore throat. Negative for congestion, ear discharge, ear pain, rhinorrhea, sinus pressure, sinus pain, sneezing and trouble swallowing.    Eyes: Negative.    Respiratory: Positive for cough (occasional) and chest tightness. Negative for shortness of breath and wheezing.    Cardiovascular: Positive for chest pain (mild tightness the last few days). Negative for palpitations and leg swelling.   Gastrointestinal: Negative for abdominal pain, anorexia, change in bowel habit, diarrhea, nausea and vomiting.   Genitourinary: Negative.    Musculoskeletal: Negative for arthralgias, joint swelling, myalgias and neck pain.   Skin: Negative for rash.   Neurological: Negative for dizziness, vertigo, weakness, " "numbness and headaches.        Objective   Vital Signs:   /90 (BP Location: Left arm, Patient Position: Sitting, Cuff Size: Adult)   Pulse 52   Temp 96.9 °F (36.1 °C) (Temporal)   Resp 20   Ht 182.9 cm (72\")   Wt 91.4 kg (201 lb 8 oz)   SpO2 100%   BMI 27.33 kg/m²       Physical Exam  Vitals and nursing note reviewed.   Constitutional:       General: He is not in acute distress.     Appearance: Normal appearance. He is not ill-appearing.   HENT:      Head: Normocephalic and atraumatic.      Right Ear: Tympanic membrane and ear canal normal. Decreased hearing ( +HA) noted.      Left Ear: Tympanic membrane and ear canal normal. Decreased hearing ( +HA) noted.      Nose: Nose normal.      Right Sinus: No maxillary sinus tenderness or frontal sinus tenderness.      Left Sinus: No maxillary sinus tenderness or frontal sinus tenderness.      Mouth/Throat:      Lips: Pink.      Mouth: Mucous membranes are moist.      Pharynx: No pharyngeal swelling, oropharyngeal exudate or posterior oropharyngeal erythema.      Comments: Moderate clear PND noted  Eyes:      General:         Right eye: No discharge.         Left eye: No discharge.      Conjunctiva/sclera: Conjunctivae normal.   Cardiovascular:      Rate and Rhythm: Regular rhythm. Bradycardia present.   Pulmonary:      Effort: Pulmonary effort is normal. No respiratory distress.      Breath sounds: No wheezing, rhonchi or rales.      Comments: Tight, barky cough noted    Musculoskeletal:      Cervical back: Neck supple. No tenderness.   Lymphadenopathy:      Cervical: No cervical adenopathy.   Skin:     General: Skin is warm and dry.   Neurological:      General: No focal deficit present.      Mental Status: He is alert and oriented to person, place, and time.   Psychiatric:         Mood and Affect: Mood normal.         Thought Content: Thought content normal.          Result Review :              Recent Results (from the past 24 hour(s))   POC Rapid Strep A "    Collection Time: 07/19/21 12:18 PM    Specimen: Swab   Result Value Ref Range    Rapid Strep A Screen Negative Negative, VALID, INVALID, Not Performed    Internal Control Passed Passed    Lot Number [id number]     Expiration Date 10/23/22           Assessment and Plan    Diagnoses and all orders for this visit:    1. Bronchitis (Primary)  -     triamcinolone acetonide (KENALOG-40) injection 40 mg  -     azithromycin (Zithromax Z-Huan) 250 MG tablet; Take 2 tablets by mouth on day 1, then 1 tablet daily on days 2-5  Dispense: 6 tablet; Refill: 0    2. Sore throat  -     POC Rapid Strep A    3. PND (post-nasal drip)  -     triamcinolone acetonide (KENALOG-40) injection 40 mg    Push fluids  Rest  Tylenol as needed  May continue with Astelin, sinus rinses if needed  Kenalog 40 mg IM x 1 in office  Rx for Zithromax due to symptoms > 2 weeks, purulent sputum  Declines cough med    Declines Covid testing at this time    See PCP or RTC if symptoms persist/worsen  See PCP for routine f/u visit and management of chronic medical conditions      This document has been electronically signed by RON Stallworth on July 19, 2021 13:08 CDT,.

## 2021-07-19 NOTE — PATIENT INSTRUCTIONS
Acute Bronchitis, Adult    Acute bronchitis is when air tubes in the lungs (bronchi) suddenly get swollen. The condition can make it hard for you to breathe. In adults, acute bronchitis usually goes away within 2 weeks. A cough caused by bronchitis may last up to 3 weeks. Smoking, allergies, and asthma can make the condition worse.  What are the causes?  This condition is caused by:  · Cold and flu viruses. The most common cause of this condition is the virus that causes the common cold.  · Bacteria.  · Substances that irritate the lungs, including:  ? Smoke from cigarettes and other types of tobacco.  ? Dust and pollen.  ? Fumes from chemicals, gases, or burned fuel.  ? Other materials that pollute indoor or outdoor air.  · Close contact with someone who has acute bronchitis.  What increases the risk?  The following factors may make you more likely to develop this condition:  · A weak body's defense system. This is also called the immune system.  · Any condition that affects your lungs and breathing, such as asthma.  What are the signs or symptoms?  Symptoms of this condition include:  · A cough.  · Coughing up clear, yellow, or green mucus.  · Wheezing.  · Chest congestion.  · Shortness of breath.  · A fever.  · Body aches.  · Chills.  · A sore throat.  How is this treated?  Acute bronchitis may go away over time without treatment. Your doctor may recommend:  · Drinking more fluids.  · Taking a medicine for a fever or cough.  · Using a device that gets medicine into your lungs (inhaler).  · Using a vaporizer or a humidifier. These are machines that add water or moisture in the air to help with coughing and poor breathing.  Follow these instructions at home:    Activity  · Get a lot of rest.  · Avoid places where there are fumes from chemicals.  · Return to your normal activities as told by your doctor. Ask your doctor what activities are safe for you.  Lifestyle  · Drink enough fluids to keep your pee (urine) pale  yellow.  · Do not drink alcohol.  · Do not use any products that contain nicotine or tobacco, such as cigarettes, e-cigarettes, and chewing tobacco. If you need help quitting, ask your doctor. Be aware that:  ? Your bronchitis will get worse if you smoke or breathe in other people's smoke (secondhand smoke).  ? Your lungs will heal faster if you quit smoking.  General instructions  · Take over-the-counter and prescription medicines only as told by your doctor.  · Use an inhaler, cool mist vaporizer, or humidifier as told by your doctor.  · Rinse your mouth often with salt water. To make salt water, dissolve ½-1 tsp (3-6 g) of salt in 1 cup (237 mL) of warm water.  · Keep all follow-up visits as told by your doctor. This is important.  How is this prevented?  To lower your risk of getting this condition again:  · Wash your hands often with soap and water. If soap and water are not available, use hand .  · Avoid contact with people who have cold symptoms.  · Try not to touch your mouth, nose, or eyes with your hands.  · Make sure to get the flu shot every year.  Contact a doctor if:  · Your symptoms do not get better in 2 weeks.  · You vomit more than once or twice.  · You have symptoms of loss of fluid from your body (dehydration). These include:  ? Dark urine.  ? Dry skin or eyes.  ? Increased thirst.  ? Headaches.  ? Confusion.  ? Muscle cramps.  Get help right away if:  · You cough up blood.  · You have chest pain.  · You have very bad shortness of breath.  · You become dehydrated.  · You faint or keep feeling like you are going to faint.  · You keep vomiting.  · You have a very bad headache.  · Your fever or chills get worse.  These symptoms may be an emergency. Do not wait to see if the symptoms will go away. Get medical help right away. Call your local emergency services (911 in the [place] Do not drive yourself to the hospital.  Summary  · Acute bronchitis is when air tubes in the lungs (bronchi)  suddenly get swollen. In adults, acute bronchitis usually goes away within 2 weeks.  · Take over-the-counter and prescription medicines only as told by your doctor.  · Drink enough fluid to keep your pee (urine) pale yellow.  · Contact a doctor if your symptoms do not improve after 2 weeks of treatment.  · Get help right away if you cough up blood, faint, or have chest pain or shortness of breath.  This information is not intended to replace advice given to you by your health care provider. Make sure you discuss any questions you have with your health care provider.  Document Revised: 07/10/2020 Document Reviewed: 07/10/2020  [employer] Patient Education © 2021 [employer]      Postnasal Drip  Postnasal drip is the feeling of mucus going down the back of your throat. Mucus is a slimy substance that moistens and cleans your nose and throat, as well as the air pockets in face bones near your forehead and cheeks (sinuses). Small amounts of mucus pass from your nose and sinuses down the back of your throat all the time. This is normal. When you produce too much mucus or the mucus gets too thick, you can feel it.  Some common causes of postnasal drip include:  · Having more mucus because of:  ? A cold or the flu.  ? Allergies.  ? Cold air.  ? Certain medicines.  · Having more mucus that is thicker because of:  ? A sinus or nasal infection.  ? Dry air.  ? A food allergy.  Follow these instructions at home:  Relieving discomfort    · Gargle with a salt-water mixture 3-4 times a day or as needed. To make a salt-water mixture, completely dissolve ½-1 tsp of salt in 1 cup of warm water.  · If the air in your home is dry, use a humidifier to add moisture to the air.  · Use a saline spray or container (neti pot) to flush out the nose (nasal irrigation). These methods can help clear away mucus and keep the nasal passages moist.  General instructions  · Take over-the-counter and prescription medicines only as told by your health  care provider.  · Follow instructions from your health care provider about eating or drinking restrictions. You may need to avoid caffeine.  · Avoid things that you know you are allergic to (allergens), like dust, mold, pollen, pets, or certain foods.  · Drink enough fluid to keep your urine pale yellow.  · Keep all follow-up visits as told by your health care provider. This is important.  Contact a health care provider if:  · You have a fever.  · You have a sore throat.  · You have difficulty swallowing.  · You have headache.  · You have sinus pain.  · You have a cough that does not go away.  · The mucus from your nose becomes thick and is green or yellow in color.  · You have cold or flu symptoms that last more than 10 days.  Summary  · Postnasal drip is the feeling of mucus going down the back of your throat.  · If your health care provider approves, use nasal irrigation or a nasal spray 2?4 times a day.  · Avoid things that you know you are allergic to (allergens), like dust, mold, pollen, pets, or certain foods.  This information is not intended to replace advice given to you by your health care provider. Make sure you discuss any questions you have with your health care provider.  Document Revised: 04/22/2021 Document Reviewed: 04/02/2018  [employer] Patient Education © 2021 [employer]

## 2021-08-23 ENCOUNTER — OFFICE VISIT (OUTPATIENT)
Dept: FAMILY MEDICINE CLINIC | Facility: CLINIC | Age: 61
End: 2021-08-23

## 2021-08-23 VITALS
BODY MASS INDEX: 27.16 KG/M2 | TEMPERATURE: 98 F | RESPIRATION RATE: 20 BRPM | WEIGHT: 200.5 LBS | HEART RATE: 70 BPM | HEIGHT: 72 IN | SYSTOLIC BLOOD PRESSURE: 130 MMHG | OXYGEN SATURATION: 97 % | DIASTOLIC BLOOD PRESSURE: 90 MMHG

## 2021-08-23 DIAGNOSIS — R10.9 RIGHT FLANK PAIN: ICD-10-CM

## 2021-08-23 DIAGNOSIS — J40 BRONCHITIS: ICD-10-CM

## 2021-08-23 DIAGNOSIS — J30.9 CHRONIC ALLERGIC RHINITIS: Primary | Chronic | ICD-10-CM

## 2021-08-23 LAB
BILIRUB BLD-MCNC: NEGATIVE MG/DL
CLARITY, POC: CLEAR
COLOR UR: YELLOW
GLUCOSE UR STRIP-MCNC: NEGATIVE MG/DL
KETONES UR QL: NEGATIVE
LEUKOCYTE EST, POC: NEGATIVE
NITRITE UR-MCNC: NEGATIVE MG/ML
PH UR: 6 [PH] (ref 5–8)
PROT UR STRIP-MCNC: NEGATIVE MG/DL
RBC # UR STRIP: ABNORMAL /UL
SP GR UR: 1.02 (ref 1–1.03)
UROBILINOGEN UR QL: NORMAL

## 2021-08-23 PROCEDURE — 99214 OFFICE O/P EST MOD 30 MIN: CPT | Performed by: NURSE PRACTITIONER

## 2021-08-23 PROCEDURE — 87086 URINE CULTURE/COLONY COUNT: CPT | Performed by: NURSE PRACTITIONER

## 2021-08-23 PROCEDURE — 81015 MICROSCOPIC EXAM OF URINE: CPT | Performed by: NURSE PRACTITIONER

## 2021-08-23 PROCEDURE — 81003 URINALYSIS AUTO W/O SCOPE: CPT | Performed by: NURSE PRACTITIONER

## 2021-08-23 RX ORDER — MONTELUKAST SODIUM 10 MG/1
10 TABLET ORAL NIGHTLY
Qty: 30 TABLET | Refills: 2 | Status: SHIPPED | OUTPATIENT
Start: 2021-08-23 | End: 2021-09-28 | Stop reason: SDUPTHER

## 2021-08-23 RX ORDER — AZITHROMYCIN 250 MG/1
TABLET, FILM COATED ORAL
Qty: 6 TABLET | Refills: 0 | Status: SHIPPED | OUTPATIENT
Start: 2021-08-23 | End: 2021-09-28

## 2021-08-23 NOTE — PROGRESS NOTES
"Chief Complaint  sinus drainage/cough/chest congestion     Subjective          Guevara Meza presents to [facility]    [facility]    PCP: Dr. Pabon    CC: \"sinus drainage, cough, chest congestion\"    Started after he was doing some work in a [place] house.  Did not wear a mask and breathed a lot of dust in at that time.  Symptoms started shortly thereafter.  Denies fever, chills, body aches.  Denies Covid exposures.  Has not had Covid vaccine.  Had similar symptoms a month ago after mowing the yard without a mask.  Received Kenalog injection, Zpak and improved.  Was good until the last week when he was exposed to the dust.  Not taking any oral allergy meds, but does use saline rinses, Astelin most days.  Took a prednisone he had and it seemed to help some.     Also c/o right flank pain.  Symptoms seem to come and go and get better if he uses ab lounger.  Denies dysuria, frequency, urgency.  Denies hx of kidney stones--has seen urology in the past, but has been several years and reports he was worked up for stones and none were found. Does have history of degenerative changes on spinal xrays. Hx of prostatectomy.     Cough  This is a new problem. The current episode started in the past 7 days. The problem has been gradually worsening. The problem occurs every few minutes. The cough is productive of purulent sputum (was clear, now thick and yellow ). Associated symptoms include chest pain (mild tightness), headaches (mild headache yesterday, none today), postnasal drip, a sore throat (raw) and shortness of breath. Pertinent negatives include no chills, ear congestion, ear pain, fever, heartburn, hemoptysis, myalgias, nasal congestion, rash, rhinorrhea, sweats, weight loss or wheezing. The symptoms are aggravated by dust. He has tried oral steroids for the symptoms. The treatment provided mild relief. His past medical history is significant for bronchitis and " "environmental allergies.       Review of Systems   Constitutional: Negative.  Negative for chills, fever and weight loss.   HENT: Positive for postnasal drip and sore throat (raw). Negative for congestion, ear pain, rhinorrhea, sinus pressure, sinus pain, sneezing and trouble swallowing.    Eyes: Negative.    Respiratory: Positive for cough, chest tightness and shortness of breath. Negative for hemoptysis and wheezing.    Cardiovascular: Positive for chest pain (mild tightness). Negative for palpitations and leg swelling.   Gastrointestinal: Negative.  Negative for heartburn.   Genitourinary: Positive for flank pain ( right). Negative for dysuria, frequency and urgency.   Musculoskeletal: Positive for back pain. Negative for myalgias.   Skin: Negative for rash.   Allergic/Immunologic: Positive for environmental allergies.   Neurological: Positive for headaches (mild headache yesterday, none today). Negative for dizziness.        Objective   Vital Signs:   /90 (BP Location: Left arm, Patient Position: Sitting, Cuff Size: Adult)   Pulse 70   Temp 98 °F (36.7 °C) (Temporal)   Resp 20   Ht 182.9 cm (72\")   Wt 90.9 kg (200 lb 8 oz)   SpO2 97%   BMI 27.19 kg/m²       Physical Exam  Vitals and nursing note reviewed.   Constitutional:       General: He is not in acute distress.     Appearance: Normal appearance. He is not ill-appearing.   HENT:      Head: Normocephalic and atraumatic.      Right Ear: Tympanic membrane and ear canal normal.      Left Ear: Tympanic membrane and ear canal normal.      Nose: Congestion ( mild) present. No rhinorrhea.      Mouth/Throat:      Mouth: Mucous membranes are moist.      Pharynx: Posterior oropharyngeal erythema ( mild injection with moderate to large amount of PND noted) present. No oropharyngeal exudate.   Eyes:      General:         Right eye: No discharge.         Left eye: No discharge.      Conjunctiva/sclera: Conjunctivae normal.   Cardiovascular:      Rate and " Rhythm: Normal rate and regular rhythm.   Pulmonary:      Effort: Pulmonary effort is normal. No respiratory distress.      Breath sounds: No wheezing, rhonchi or rales.      Comments: Slightly decreased aeration with tight, congested cough noted  Abdominal:      General: Bowel sounds are normal.      Palpations: Abdomen is soft.      Tenderness: There is no abdominal tenderness. There is no right CVA tenderness ( no pain currently), left CVA tenderness, guarding or rebound.   Musculoskeletal:      Cervical back: Neck supple. No tenderness.   Lymphadenopathy:      Cervical: No cervical adenopathy.   Skin:     General: Skin is warm and dry.   Neurological:      General: No focal deficit present.      Mental Status: He is alert and oriented to person, place, and time.   Psychiatric:         Mood and Affect: Mood normal.         Thought Content: Thought content normal.          Result Review :     Common labs    Common Labsle 10/15/20 10/15/20 10/15/20 10/15/20    1118 1118 1118 1118   Glucose   91    BUN   11    Creatinine   0.91    eGFR Non African Am   84    Sodium   143    Potassium   4.2    Chloride   106    Calcium   9.4    Albumin   4.40    Total Bilirubin   0.4    Alkaline Phosphatase   78    AST (SGOT)   17    ALT (SGPT)   19    WBC 8.42      Hemoglobin 14.3      Hematocrit 41.4      Platelets 244      Total Cholesterol    191   Triglycerides    57   HDL Cholesterol    70 (A)   LDL Cholesterol     110 (A)   PSA  <0.014     (A) Abnormal value                   Recent Results (from the past 24 hour(s))   POCT urinalysis dipstick, automated    Collection Time: 08/23/21  1:51 PM    Specimen: Urine   Result Value Ref Range    Color Yellow Yellow, Straw, Dark Yellow, Michelle    Clarity, UA Clear Clear    Specific Gravity  1.020 1.005 - 1.030    pH, Urine 6.0 5.0 - 8.0    Leukocytes Negative Negative    Nitrite, UA Negative Negative    Protein, POC Negative Negative mg/dL    Glucose, UA Negative Negative, 1000 mg/dL  (3+) mg/dL    Ketones, UA Negative Negative    Urobilinogen, UA Normal Normal    Bilirubin Negative Negative    Blood, UA Small (A) Negative          Assessment and Plan    Diagnoses and all orders for this visit:    1. Chronic allergic rhinitis (Primary)  -     montelukast (Singulair) 10 MG tablet; Take 1 tablet by mouth Every Night.  Dispense: 30 tablet; Refill: 2    2. Right flank pain  -     POCT urinalysis dipstick, automated  -     Urinalysis, Microscopic Only - Urine, Clean Catch  -     Urine Culture - Urine, Urine, Clean Catch    3. Bronchitis  -     azithromycin (Zithromax Z-Huan) 250 MG tablet; Take 2 tablets by mouth on day 1, then 1 tablet daily on days 2-5  Dispense: 6 tablet; Refill: 0    Push fluids  Rest  Tylenol as needed  Recommended starting daily Singulair for allergy symptoms  Recommended wearing mask when mowing or in [place] environment  Declines Prednisone Rx at this time--took the one pill he had at home and it seemed to help  Will continue with Astelin and saline rinses  Declines need for inhaler at this time    Declines KUB today as flank pain is not present  Micro u/a and culture pending.  Will call with results when available.     See PCP or RTC if symptoms persist/worsen  See PCP for routine f/u visit and management of chronic medical conditions      This document has been electronically signed by RON Stallworth on August 23, 2021 14:35 CDT,.    I spent 30 minutes caring for Guevara on this date of service. This time includes time spent by me in the following activities:preparing for the visit, reviewing tests, performing a medically appropriate examination and/or evaluation , counseling and educating the patient/family/caregiver, ordering medications, tests, or procedures and documenting information in the medical record

## 2021-08-24 LAB
BACTERIA UR QL AUTO: NORMAL /HPF
HYALINE CASTS UR QL AUTO: NORMAL /LPF
RBC # UR: NORMAL /HPF
REF LAB TEST METHOD: NORMAL
SQUAMOUS #/AREA URNS HPF: NORMAL /HPF
WBC UR QL AUTO: NORMAL /HPF

## 2021-08-25 LAB — BACTERIA SPEC AEROBE CULT: ABNORMAL

## 2021-08-26 ENCOUNTER — TELEPHONE (OUTPATIENT)
Dept: OTOLARYNGOLOGY | Facility: CLINIC | Age: 61
End: 2021-08-26

## 2021-08-26 RX ORDER — SULFAMETHOXAZOLE AND TRIMETHOPRIM 800; 160 MG/1; MG/1
1 TABLET ORAL 2 TIMES DAILY
Qty: 10 TABLET | Refills: 0 | Status: SHIPPED | OUTPATIENT
Start: 2021-08-26 | End: 2021-08-31

## 2021-08-26 NOTE — TELEPHONE ENCOUNTER
The following message was relayed to the patient and states he will call and make his 6 month follow up.       The record says she gave him the azithromycin for bronchitis not a sinus infection. A lot of people are having flareups of their allergies right now because its ragweed season. Ms Franco added an allergy medicine that should help but will take a couple of weeks to fully kick in. He should take those medications and I suspect he will improve. He was supposed to schedule a follow up with me for December but it oooks like he didn't so he needs to do that

## 2021-08-26 NOTE — TELEPHONE ENCOUNTER
Called patient regarding sinus infection:    Patient saw William Franco on 8-23-21 pt states he wanted to stay on top of things and not let it get bad. He was given a 5 day course of Azithromycin and can tell its trying to break up some. Pt wants to know if there is a reason he is getting these sinus infections so easily?     Told him I would relay his question to Dr. Solano and get back with him.

## 2021-08-30 ENCOUNTER — TELEPHONE (OUTPATIENT)
Dept: FAMILY MEDICINE CLINIC | Facility: CLINIC | Age: 61
End: 2021-08-30

## 2021-08-30 RX ORDER — PREDNISONE 10 MG/1
TABLET ORAL
Qty: 7 TABLET | Refills: 0 | Status: SHIPPED | OUTPATIENT
Start: 2021-08-30 | End: 2021-09-28

## 2021-08-30 RX ORDER — BENZONATATE 100 MG/1
CAPSULE ORAL
Qty: 30 CAPSULE | Refills: 1 | Status: SHIPPED | OUTPATIENT
Start: 2021-08-30 | End: 2021-09-28

## 2021-08-30 NOTE — TELEPHONE ENCOUNTER
Patient called said he's getting better since he was seen in the same day clinic. He said he's still coughing/head cold. If you could please give him a call or get him something called in for the pharmacy

## 2021-09-20 ENCOUNTER — TELEPHONE (OUTPATIENT)
Dept: FAMILY MEDICINE CLINIC | Facility: CLINIC | Age: 61
End: 2021-09-20

## 2021-09-20 NOTE — TELEPHONE ENCOUNTER
Spoke with pt to let him know Dr. Pabon would order labs at his appointment to determine what was needed. Pt not due for routine labs until October. Pt voiced understanding.

## 2021-09-20 NOTE — TELEPHONE ENCOUNTER
Patient called was wondering if he could get his labs put in before his appointment with dr. vaughn on the 28th.

## 2021-09-27 ENCOUNTER — TELEPHONE (OUTPATIENT)
Dept: FAMILY MEDICINE CLINIC | Facility: CLINIC | Age: 61
End: 2021-09-27

## 2021-09-28 ENCOUNTER — OFFICE VISIT (OUTPATIENT)
Dept: FAMILY MEDICINE CLINIC | Facility: CLINIC | Age: 61
End: 2021-09-28

## 2021-09-28 VITALS
BODY MASS INDEX: 27.58 KG/M2 | DIASTOLIC BLOOD PRESSURE: 60 MMHG | TEMPERATURE: 97.1 F | HEIGHT: 72 IN | OXYGEN SATURATION: 95 % | WEIGHT: 203.6 LBS | SYSTOLIC BLOOD PRESSURE: 120 MMHG | HEART RATE: 59 BPM

## 2021-09-28 DIAGNOSIS — M79.671 BILATERAL LEG AND FOOT PAIN: ICD-10-CM

## 2021-09-28 DIAGNOSIS — J30.9 CHRONIC ALLERGIC RHINITIS: Chronic | ICD-10-CM

## 2021-09-28 DIAGNOSIS — M79.2 NEUROPATHIC PAIN: ICD-10-CM

## 2021-09-28 DIAGNOSIS — Z90.79 H/O PROSTATECTOMY: ICD-10-CM

## 2021-09-28 DIAGNOSIS — M79.604 BILATERAL LEG AND FOOT PAIN: ICD-10-CM

## 2021-09-28 DIAGNOSIS — I10 ESSENTIAL HYPERTENSION: ICD-10-CM

## 2021-09-28 DIAGNOSIS — M79.605 BILATERAL LEG AND FOOT PAIN: ICD-10-CM

## 2021-09-28 DIAGNOSIS — Z79.899 ENCOUNTER FOR LONG-TERM CURRENT USE OF HIGH RISK MEDICATION: ICD-10-CM

## 2021-09-28 DIAGNOSIS — M79.604 RIGHT LEG PAIN: ICD-10-CM

## 2021-09-28 DIAGNOSIS — R12 HEART BURN: ICD-10-CM

## 2021-09-28 DIAGNOSIS — Z85.46 H/O PROSTATE CANCER: ICD-10-CM

## 2021-09-28 DIAGNOSIS — E78.5 HYPERLIPIDEMIA, UNSPECIFIED HYPERLIPIDEMIA TYPE: ICD-10-CM

## 2021-09-28 DIAGNOSIS — J01.41 ACUTE RECURRENT PANSINUSITIS: ICD-10-CM

## 2021-09-28 DIAGNOSIS — M79.672 BILATERAL LEG AND FOOT PAIN: ICD-10-CM

## 2021-09-28 PROCEDURE — 99214 OFFICE O/P EST MOD 30 MIN: CPT | Performed by: FAMILY MEDICINE

## 2021-09-28 RX ORDER — GABAPENTIN 300 MG/1
300 CAPSULE ORAL 3 TIMES DAILY
Qty: 90 CAPSULE | Refills: 3 | Status: SHIPPED | OUTPATIENT
Start: 2021-09-28 | End: 2022-02-22 | Stop reason: SDUPTHER

## 2021-09-28 RX ORDER — CIPROFLOXACIN 500 MG/1
500 TABLET, FILM COATED ORAL 2 TIMES DAILY
Qty: 10 TABLET | Refills: 0 | Status: SHIPPED | OUTPATIENT
Start: 2021-09-28 | End: 2021-10-19

## 2021-09-28 RX ORDER — CETIRIZINE HYDROCHLORIDE 10 MG/1
10 TABLET ORAL DAILY
Qty: 30 TABLET | Refills: 2 | Status: SHIPPED | OUTPATIENT
Start: 2021-09-28 | End: 2021-11-22 | Stop reason: SDUPTHER

## 2021-09-28 NOTE — PROGRESS NOTES
Chief Complaint  Hypertension, Hyperlipidemia, Foot Pain, Leg Pain, and Sinusitis  ' Stopped Lipitor due to leg pains. Having problem with sinus infection'.    Subjective          Review of Systems   Constitutional: Negative for activity change, appetite change, chills, diaphoresis, fatigue and unexpected weight change.   HENT: Positive for congestion, hearing loss, sinus pressure, sinus pain and tinnitus. Negative for dental problem, drooling, ear discharge, ear pain, facial swelling, mouth sores, nosebleeds, postnasal drip, rhinorrhea, sneezing, sore throat, trouble swallowing and voice change.    Eyes: Positive for itching. Negative for photophobia, pain, discharge, redness and visual disturbance.        Wears glasses     Eye exams with Dr. Glaser   Respiratory: Positive for cough. Negative for apnea, choking, chest tightness, wheezing and stridor.    Cardiovascular: Negative for palpitations and leg swelling.   Gastrointestinal: Negative for abdominal distention, anal bleeding, blood in stool, constipation, diarrhea and rectal pain.        Rectal stenosis   Endocrine: Negative for cold intolerance, heat intolerance, polydipsia, polyphagia and polyuria.   Genitourinary: Positive for enuresis. Negative for decreased urine volume, difficulty urinating, discharge, dysuria, flank pain, frequency, genital sores, hematuria, penile pain, penile swelling, scrotal swelling, testicular pain and urgency.        Small R hernia felt in scrotum at times, told OK    Musculoskeletal: Positive for back pain. Negative for gait problem and neck stiffness.        R foot, Plantar faciitis.  R calf pain.     L5 DDD  Occasional back pain more on R    R shoulder pain   Skin: Negative for color change, pallor and wound. Rash: gu         Eczema   Allergic/Immunologic: Positive for environmental allergies. Negative for food allergies and immunocompromised state.   Neurological: Positive for tremors. Negative for dizziness, seizures,  syncope, facial asymmetry, speech difficulty and light-headedness.   Hematological: Negative for adenopathy. Bruises/bleeds easily.   Psychiatric/Behavioral: Positive for sleep disturbance. Negative for agitation, behavioral problems, confusion, decreased concentration, dysphoric mood, hallucinations, self-injury and suicidal ideas. The patient has insomnia. The patient is not nervous/anxious and is not hyperactive.         Neurotin helps with pain and sleep,      Sleeps 5 hrs with med.        Guevara Meza presents to [facility] [place]  Hyperlipidemia  This is a chronic problem. The current episode started more than 1 year ago. Recent lipid tests were reviewed and are variable. Current antihyperlipidemic treatment includes statins. The current treatment provides significant improvement of lipids. Risk factors for coronary artery disease include dyslipidemia, hypertension and male sex.   Allergies  This is a chronic problem. The current episode started more than 1 year ago. The problem has been waxing and waning. Associated symptoms include congestion and coughing. Pertinent negatives include no chills, diaphoresis, fatigue or sore throat. Rash: gu  The symptoms are aggravated by coughing. Treatments tried: Antihistamines. The treatment provided mild relief.   Hypertension  This is a chronic problem. The current episode started more than 1 year ago. The problem is controlled. Pertinent negatives include no palpitations. Agents associated with hypertension include decongestants and NSAIDs. Risk factors for coronary artery disease include male gender and dyslipidemia. Past treatments include ACE inhibitors. Current antihypertension treatment includes ACE inhibitors. The current treatment provides significant improvement.   Heartburn  He complains of coughing. He reports no choking, no sore throat or no wheezing. ED, Urinary incontinence.. This is a chronic  problem. The current episode started more than 1 year ago. The problem occurs occasionally. The problem has been waxing and waning. The symptoms are aggravated by certain foods. Pertinent negatives include no fatigue. He has tried a PPI for the symptoms. The treatment provided mild relief.   Insomnia  This is a chronic problem. The current episode started more than 1 year ago. The problem occurs intermittently. Associated symptoms include congestion and coughing. Pertinent negatives include no chills, diaphoresis, fatigue or sore throat. Rash: gu  Treatments tried: Benadryl, Neurontin. The treatment provided moderate relief.   Sinusitis  This is a chronic problem. The current episode started more than 1 month ago. The problem has been gradually worsening since onset. His pain is at a severity of 5/10. The pain is moderate. Associated symptoms include congestion, coughing and sinus pressure. Pertinent negatives include no chills, diaphoresis, ear pain, sneezing or sore throat. Past treatments include nothing. The treatment provided no relief.   Cancer  This is a chronic (prostate cancer, S/P Prostatectomy) problem. The current episode started more than 1 year ago. The problem occurs daily. The problem has been gradually improving. Associated symptoms include congestion and coughing. Pertinent negatives include no chills, diaphoresis, fatigue or sore throat. Rash: gu  Nothing aggravates the symptoms. Treatments tried: Surgery. The treatment provided significant relief.   Male  Problem  The patient's pertinent negatives include no penile discharge, penile pain, scrotal swelling or testicular pain. Primary symptoms comment: ED, Urinary incontinence.. This is a chronic problem. The current episode started more than 1 year ago. The problem occurs daily. The problem has been waxing and waning. Associated symptoms include coughing. Pertinent negatives include no chills, constipation, diarrhea, dysuria, flank pain,  "frequency, sore throat or urgency. Rash: gu  Exacerbated by: S/P Prostatectomy for cancer. The treatment provided mild relief. (Prostate Cancer.)   Pain  This is a chronic problem. The current episode started more than 1 year ago. The problem occurs daily. The problem has been waxing and waning. Associated symptoms include congestion and coughing. Pertinent negatives include no chills, diaphoresis, fatigue or sore throat. Rash: gu  Associated symptoms comments: Neuropathic foot and leg pain. The symptoms are aggravated by standing and walking. Treatments tried: shoe inserts. The treatment provided significant relief.       Objective   Vital Signs:   /60 (BP Location: Right arm, Patient Position: Sitting, Cuff Size: Adult)   Pulse 59   Temp 97.1 °F (36.2 °C) (Temporal)   Ht 182.9 cm (72\")   Wt 92.4 kg (203 lb 9.6 oz)   SpO2 95%   BMI 27.61 kg/m²     Physical Exam  Vitals and nursing note reviewed.   Constitutional:       General: He is not in acute distress.     Appearance: Normal appearance. He is well-developed. He is not diaphoretic.   HENT:      Head: Normocephalic.      Comments: Hearing aides     Right Ear: Tympanic membrane, ear canal and external ear normal. There is no impacted cerumen.      Left Ear: Tympanic membrane, ear canal and external ear normal. There is no impacted cerumen.      Nose: Congestion and rhinorrhea present.      Comments: Tan post nasal drainage.      Mouth/Throat:      Mouth: Mucous membranes are moist.   Eyes:      General: No scleral icterus.        Right eye: No discharge.         Left eye: No discharge.      Conjunctiva/sclera: Conjunctivae normal.      Pupils: Pupils are equal, round, and reactive to light.   Neck:      Thyroid: No thyromegaly.      Vascular: No JVD.      Trachea: No tracheal deviation.   Cardiovascular:      Rate and Rhythm: Normal rate.      Heart sounds: Normal heart sounds. No murmur heard.   No friction rub. No gallop.    Pulmonary:      Effort: " Pulmonary effort is normal. No respiratory distress.      Breath sounds: Normal breath sounds. No stridor. No wheezing or rales.   Chest:      Chest wall: No tenderness.   Abdominal:      General: Bowel sounds are normal. There is no distension.      Palpations: There is no mass.      Tenderness: There is no abdominal tenderness. There is no guarding or rebound.      Hernia: No hernia is present.   Musculoskeletal:         General: No tenderness or deformity.   Lymphadenopathy:      Cervical: No cervical adenopathy.   Skin:     General: Skin is warm and dry.      Coloration: Skin is not pale.      Findings: No erythema or rash.   Neurological:      Mental Status: He is alert and oriented to person, place, and time.      Cranial Nerves: No cranial nerve deficit.      Motor: No abnormal muscle tone.      Coordination: Coordination normal.      Deep Tendon Reflexes: Reflexes are normal and symmetric. Reflexes normal.   Psychiatric:         Mood and Affect: Mood normal.         Behavior: Behavior normal.         Thought Content: Thought content normal.         Judgment: Judgment normal.        Result Review :                 Assessment and Plan {CC Problem List  Visit Diagnosis  ROS  Review (Popup)  [facility] Maintenance  Quality  BestPractice  Medications  SmartSets  SnapShot Encounters  Media :23}   Diagnoses and all orders for this visit:    1. Encounter for long-term current use of high risk medication  -     Urine Drug Screen - Urine, Clean Catch    2. Right leg pain  -     gabapentin (NEURONTIN) 300 MG capsule; Take 1 capsule by mouth 3 (Three) Times a Day.  Dispense: 90 capsule; Refill: 3    3. Bilateral leg and foot pain  -     gabapentin (NEURONTIN) 300 MG capsule; Take 1 capsule by mouth 3 (Three) Times a Day.  Dispense: 90 capsule; Refill: 3    4. Neuropathic pain  -     gabapentin (NEURONTIN) 300 MG capsule; Take 1 capsule by mouth 3 (Three) Times a Day.  Dispense: 90 capsule; Refill: 3    5.  Essential hypertension  -     CBC & Differential; Future  -     Comprehensive metabolic panel; Future  -     TSH; Future  -     Urinalysis With Culture If Indicated -; Future  -     ramipril (ALTACE) 10 MG capsule; Take 1 capsule by mouth Daily.  Dispense: 90 capsule; Refill: 1    6. Hyperlipidemia, unspecified hyperlipidemia type  -     CBC & Differential; Future  -     Comprehensive metabolic panel; Future  -     TSH; Future  -     Urinalysis With Culture If Indicated -; Future  -     Lipid Panel; Future    7. H/O prostate cancer    8. H/O prostatectomy    9. Chronic allergic rhinitis  -     montelukast (Singulair) 10 MG tablet; Take 1 tablet by mouth Every Night.  Dispense: 90 tablet; Refill: 1    10. Heart burn  -     omeprazole (priLOSEC) 40 MG capsule; Take 1 capsule by mouth Daily.  Dispense: 90 capsule; Refill: 1    11. Acute recurrent pansinusitis  -     ciprofloxacin (Cipro) 500 MG tablet; Take 1 tablet by mouth 2 (Two) Times a Day.  Dispense: 10 tablet; Refill: 0  -     cetirizine (zyrTEC) 10 MG tablet; Take 1 tablet by mouth Daily.  Dispense: 30 tablet; Refill: 2        Follow Up   Return in about 4 months (around 1/28/2022).  Patient was given instructions and counseling regarding his condition or for health maintenance advice. Please see specific information pulled into the AVS if appropriate.         This document has been electronically signed by Rd Pabon MD

## 2021-09-29 PROBLEM — J01.41 ACUTE RECURRENT PANSINUSITIS: Status: ACTIVE | Noted: 2021-09-29

## 2021-09-29 PROBLEM — Z79.899 ENCOUNTER FOR LONG-TERM CURRENT USE OF HIGH RISK MEDICATION: Status: ACTIVE | Noted: 2021-09-29

## 2021-09-29 RX ORDER — MONTELUKAST SODIUM 10 MG/1
10 TABLET ORAL NIGHTLY
Qty: 90 TABLET | Refills: 1 | Status: SHIPPED | OUTPATIENT
Start: 2021-09-29 | End: 2022-02-22 | Stop reason: SDUPTHER

## 2021-09-29 RX ORDER — OMEPRAZOLE 40 MG/1
40 CAPSULE, DELAYED RELEASE ORAL DAILY
Qty: 90 CAPSULE | Refills: 1 | Status: SHIPPED | OUTPATIENT
Start: 2021-09-29 | End: 2022-02-22 | Stop reason: SDUPTHER

## 2021-09-29 RX ORDER — RAMIPRIL 10 MG/1
10 CAPSULE ORAL DAILY
Qty: 90 CAPSULE | Refills: 1 | Status: SHIPPED | OUTPATIENT
Start: 2021-09-29 | End: 2022-02-22 | Stop reason: SDUPTHER

## 2021-10-15 ENCOUNTER — TELEPHONE (OUTPATIENT)
Dept: FAMILY MEDICINE CLINIC | Facility: CLINIC | Age: 61
End: 2021-10-15

## 2021-10-15 NOTE — TELEPHONE ENCOUNTER
Patient said he was told today to let dr. vaughn know about the labs for a PSA    Patient stated his side is still hurting. Insurance wouldn't allow a CT scan. So he isn't sure what to do.

## 2021-10-18 DIAGNOSIS — R10.9 ABDOMINAL PAIN, UNSPECIFIED ABDOMINAL LOCATION: ICD-10-CM

## 2021-10-18 DIAGNOSIS — Z12.5 SCREENING FOR PROSTATE CANCER: Primary | ICD-10-CM

## 2021-10-18 NOTE — TELEPHONE ENCOUNTER
I put order in for PSA also a blood test to see if any infection in body. Make sure to let lab know to pull today's plus what was due.

## 2021-10-19 RX ORDER — TAMSULOSIN HYDROCHLORIDE 0.4 MG/1
1 CAPSULE ORAL DAILY
Qty: 30 CAPSULE | Refills: 0 | Status: SHIPPED | OUTPATIENT
Start: 2021-10-19 | End: 2021-11-22

## 2021-10-19 RX ORDER — CIPROFLOXACIN 500 MG/1
500 TABLET, FILM COATED ORAL 2 TIMES DAILY
Qty: 10 TABLET | Refills: 0 | Status: SHIPPED | OUTPATIENT
Start: 2021-10-19 | End: 2021-10-22

## 2021-10-20 ENCOUNTER — LAB (OUTPATIENT)
Dept: LAB | Facility: HOSPITAL | Age: 61
End: 2021-10-20

## 2021-10-20 DIAGNOSIS — R10.9 ABDOMINAL PAIN, UNSPECIFIED ABDOMINAL LOCATION: ICD-10-CM

## 2021-10-20 DIAGNOSIS — E78.5 HYPERLIPIDEMIA, UNSPECIFIED HYPERLIPIDEMIA TYPE: ICD-10-CM

## 2021-10-20 DIAGNOSIS — I10 ESSENTIAL HYPERTENSION: ICD-10-CM

## 2021-10-20 DIAGNOSIS — Z12.5 SCREENING FOR PROSTATE CANCER: ICD-10-CM

## 2021-10-20 LAB
ALBUMIN SERPL-MCNC: 4.4 G/DL (ref 3.5–5.2)
ALBUMIN/GLOB SERPL: 1.7 G/DL
ALP SERPL-CCNC: 70 U/L (ref 39–117)
ALT SERPL W P-5'-P-CCNC: 14 U/L (ref 1–41)
AMPHET+METHAMPHET UR QL: NEGATIVE
AMPHETAMINES UR QL: NEGATIVE
ANION GAP SERPL CALCULATED.3IONS-SCNC: 11.2 MMOL/L (ref 5–15)
AST SERPL-CCNC: 15 U/L (ref 1–40)
BARBITURATES UR QL SCN: NEGATIVE
BASOPHILS # BLD AUTO: 0.06 10*3/MM3 (ref 0–0.2)
BASOPHILS NFR BLD AUTO: 1.1 % (ref 0–1.5)
BENZODIAZ UR QL SCN: NEGATIVE
BILIRUB SERPL-MCNC: 0.5 MG/DL (ref 0–1.2)
BILIRUB UR QL STRIP: NEGATIVE
BUN SERPL-MCNC: 10 MG/DL (ref 8–23)
BUN/CREAT SERPL: 10.1 (ref 7–25)
BUPRENORPHINE SERPL-MCNC: NEGATIVE NG/ML
CALCIUM SPEC-SCNC: 9.1 MG/DL (ref 8.6–10.5)
CANNABINOIDS SERPL QL: NEGATIVE
CHLORIDE SERPL-SCNC: 103 MMOL/L (ref 98–107)
CHOLEST SERPL-MCNC: 192 MG/DL (ref 0–200)
CLARITY UR: CLEAR
CO2 SERPL-SCNC: 24.8 MMOL/L (ref 22–29)
COCAINE UR QL: NEGATIVE
COLOR UR: YELLOW
CREAT SERPL-MCNC: 0.99 MG/DL (ref 0.76–1.27)
CRP SERPL-MCNC: <0.3 MG/DL (ref 0–0.5)
DEPRECATED RDW RBC AUTO: 39 FL (ref 37–54)
EOSINOPHIL # BLD AUTO: 0.29 10*3/MM3 (ref 0–0.4)
EOSINOPHIL NFR BLD AUTO: 5.4 % (ref 0.3–6.2)
ERYTHROCYTE [DISTWIDTH] IN BLOOD BY AUTOMATED COUNT: 12.3 % (ref 12.3–15.4)
GFR SERPL CREATININE-BSD FRML MDRD: 76 ML/MIN/1.73
GLOBULIN UR ELPH-MCNC: 2.6 GM/DL
GLUCOSE SERPL-MCNC: 87 MG/DL (ref 65–99)
GLUCOSE UR STRIP-MCNC: NEGATIVE MG/DL
HCT VFR BLD AUTO: 41.7 % (ref 37.5–51)
HDLC SERPL-MCNC: 56 MG/DL (ref 40–60)
HGB BLD-MCNC: 14.8 G/DL (ref 13–17.7)
HGB UR QL STRIP.AUTO: NEGATIVE
IMM GRANULOCYTES # BLD AUTO: 0.02 10*3/MM3 (ref 0–0.05)
IMM GRANULOCYTES NFR BLD AUTO: 0.4 % (ref 0–0.5)
KETONES UR QL STRIP: NEGATIVE
LDLC SERPL CALC-MCNC: 125 MG/DL (ref 0–100)
LDLC/HDLC SERPL: 2.23 {RATIO}
LEUKOCYTE ESTERASE UR QL STRIP.AUTO: NEGATIVE
LYMPHOCYTES # BLD AUTO: 1.33 10*3/MM3 (ref 0.7–3.1)
LYMPHOCYTES NFR BLD AUTO: 24.6 % (ref 19.6–45.3)
MCH RBC QN AUTO: 31.6 PG (ref 26.6–33)
MCHC RBC AUTO-ENTMCNC: 35.5 G/DL (ref 31.5–35.7)
MCV RBC AUTO: 88.9 FL (ref 79–97)
METHADONE UR QL SCN: NEGATIVE
MONOCYTES # BLD AUTO: 0.45 10*3/MM3 (ref 0.1–0.9)
MONOCYTES NFR BLD AUTO: 8.3 % (ref 5–12)
NEUTROPHILS NFR BLD AUTO: 3.25 10*3/MM3 (ref 1.7–7)
NEUTROPHILS NFR BLD AUTO: 60.2 % (ref 42.7–76)
NITRITE UR QL STRIP: NEGATIVE
NRBC BLD AUTO-RTO: 0 /100 WBC (ref 0–0.2)
OPIATES UR QL: NEGATIVE
OXYCODONE UR QL SCN: NEGATIVE
PCP UR QL SCN: NEGATIVE
PH UR STRIP.AUTO: 5.5 [PH] (ref 5–8)
PLATELET # BLD AUTO: 231 10*3/MM3 (ref 140–450)
PMV BLD AUTO: 10.7 FL (ref 6–12)
POTASSIUM SERPL-SCNC: 4 MMOL/L (ref 3.5–5.2)
PROPOXYPH UR QL: NEGATIVE
PROT SERPL-MCNC: 7 G/DL (ref 6–8.5)
PROT UR QL STRIP: NEGATIVE
PSA SERPL-MCNC: <0.014 NG/ML (ref 0–4)
RBC # BLD AUTO: 4.69 10*6/MM3 (ref 4.14–5.8)
SODIUM SERPL-SCNC: 139 MMOL/L (ref 136–145)
SP GR UR STRIP: 1.01 (ref 1–1.03)
TRICYCLICS UR QL SCN: NEGATIVE
TRIGL SERPL-MCNC: 57 MG/DL (ref 0–150)
TSH SERPL DL<=0.05 MIU/L-ACNC: 3.37 UIU/ML (ref 0.27–4.2)
UROBILINOGEN UR QL STRIP: NORMAL
VLDLC SERPL-MCNC: 11 MG/DL (ref 5–40)
WBC # BLD AUTO: 5.4 10*3/MM3 (ref 3.4–10.8)

## 2021-10-20 PROCEDURE — 80053 COMPREHEN METABOLIC PANEL: CPT

## 2021-10-20 PROCEDURE — G0103 PSA SCREENING: HCPCS

## 2021-10-20 PROCEDURE — 86140 C-REACTIVE PROTEIN: CPT

## 2021-10-20 PROCEDURE — 80306 DRUG TEST PRSMV INSTRMNT: CPT | Performed by: FAMILY MEDICINE

## 2021-10-20 PROCEDURE — 85025 COMPLETE CBC W/AUTO DIFF WBC: CPT

## 2021-10-20 PROCEDURE — 80061 LIPID PANEL: CPT

## 2021-10-20 PROCEDURE — 81003 URINALYSIS AUTO W/O SCOPE: CPT

## 2021-10-20 PROCEDURE — 84443 ASSAY THYROID STIM HORMONE: CPT

## 2021-10-22 RX ORDER — SUCRALFATE 1 G/1
1 TABLET ORAL 4 TIMES DAILY PRN
Qty: 120 TABLET | Refills: 1 | Status: SHIPPED | OUTPATIENT
Start: 2021-10-22 | End: 2021-11-22 | Stop reason: SDUPTHER

## 2021-11-22 ENCOUNTER — OFFICE VISIT (OUTPATIENT)
Dept: FAMILY MEDICINE CLINIC | Facility: CLINIC | Age: 61
End: 2021-11-22

## 2021-11-22 VITALS
OXYGEN SATURATION: 98 % | HEART RATE: 64 BPM | SYSTOLIC BLOOD PRESSURE: 126 MMHG | BODY MASS INDEX: 27.96 KG/M2 | HEIGHT: 72 IN | WEIGHT: 206.4 LBS | TEMPERATURE: 97.1 F | DIASTOLIC BLOOD PRESSURE: 84 MMHG

## 2021-11-22 DIAGNOSIS — J34.9 SINUS PROBLEM: ICD-10-CM

## 2021-11-22 DIAGNOSIS — M25.562 PAIN IN LATERAL PORTION OF LEFT KNEE: ICD-10-CM

## 2021-11-22 DIAGNOSIS — E78.5 HYPERLIPIDEMIA, UNSPECIFIED HYPERLIPIDEMIA TYPE: ICD-10-CM

## 2021-11-22 DIAGNOSIS — R12 HEARTBURN: ICD-10-CM

## 2021-11-22 DIAGNOSIS — N52.31 ERECTILE DYSFUNCTION AFTER RADICAL PROSTATECTOMY: ICD-10-CM

## 2021-11-22 DIAGNOSIS — J01.41 ACUTE RECURRENT PANSINUSITIS: ICD-10-CM

## 2021-11-22 DIAGNOSIS — J40 BRONCHITIS: ICD-10-CM

## 2021-11-22 PROCEDURE — 99214 OFFICE O/P EST MOD 30 MIN: CPT | Performed by: FAMILY MEDICINE

## 2021-11-22 RX ORDER — AZITHROMYCIN 250 MG/1
TABLET, FILM COATED ORAL
Qty: 6 TABLET | Refills: 0 | Status: SHIPPED | OUTPATIENT
Start: 2021-11-22 | End: 2022-01-05

## 2021-11-22 RX ORDER — TADALAFIL 20 MG/1
20 TABLET ORAL DAILY PRN
Qty: 30 TABLET | Refills: 2 | Status: SHIPPED | OUTPATIENT
Start: 2021-11-22 | End: 2022-06-22 | Stop reason: SDUPTHER

## 2021-11-22 NOTE — PROGRESS NOTES
Chief Complaint  Mass (left leg, lower, Fall), Bronchitis, Sinusitis, and Erectile Dysfunction    Subjective          Review of Systems   Constitutional: Negative for activity change, appetite change and unexpected weight change.   HENT: Positive for hearing loss, sinus pressure, sinus pain and tinnitus. Negative for dental problem, drooling, ear discharge, ear pain, facial swelling, mouth sores, nosebleeds, postnasal drip, rhinorrhea, sneezing, trouble swallowing and voice change.    Eyes: Positive for itching. Negative for photophobia, pain, discharge, redness and visual disturbance.        Wears glasses     Eye exams with Dr. Glaser   Respiratory: Negative for apnea, chest tightness and stridor.    Cardiovascular: Negative for palpitations and leg swelling.   Gastrointestinal: Positive for heartburn. Negative for abdominal distention, anal bleeding, blood in stool, constipation, diarrhea and rectal pain.        Rectal stenosis   Endocrine: Negative for cold intolerance, heat intolerance, polydipsia, polyphagia and polyuria.   Genitourinary: Positive for enuresis. Negative for decreased urine volume, difficulty urinating, dysuria, flank pain, frequency, genital sores, hematuria, penile discharge, penile pain, penile swelling, scrotal swelling, testicular pain and urgency.        Small R hernia felt in scrotum at times, told OK    Musculoskeletal: Positive for back pain. Negative for gait problem and neck stiffness.        R foot, Plantar faciitis.  R calf pain.     L5 DDD  Occasional back pain more on R    R shoulder pain   Skin: Negative for color change, pallor and wound.        Eczema   Allergic/Immunologic: Positive for environmental allergies. Negative for food allergies and immunocompromised state.   Neurological: Positive for tremors. Negative for dizziness, seizures, syncope, facial asymmetry, speech difficulty and light-headedness.   Hematological: Negative for adenopathy. Bruises/bleeds easily.    Psychiatric/Behavioral: Positive for sleep disturbance. Negative for agitation, behavioral problems, confusion, decreased concentration, dysphoric mood, hallucinations, self-injury and suicidal ideas. The patient is not nervous/anxious and is not hyperactive.         Neurotin helps with pain and sleep,      Sleeps 5 hrs with med.        Guevara Meza presents to [facility] [place]  Allergies  This is a chronic problem. The current episode started more than 1 year ago. The problem has been waxing and waning. The symptoms are aggravated by coughing. Treatments tried: Antihistamines. The treatment provided mild relief.   Hypertension  This is a chronic problem. The current episode started more than 1 year ago. The problem is controlled. Pertinent negatives include no palpitations. Agents associated with hypertension include decongestants and NSAIDs. Risk factors for coronary artery disease include male gender and dyslipidemia. Past treatments include ACE inhibitors. Current antihypertension treatment includes ACE inhibitors. The current treatment provides significant improvement.   Sinusitis  This is a chronic (Bronchitis) problem. The current episode started in the past 7 days. The problem has been gradually worsening since onset. His pain is at a severity of 5/10. The pain is moderate. Associated symptoms include sinus pressure. Pertinent negatives include no ear pain or sneezing. Treatments tried: OTC Cold med. The treatment provided no relief.   Male  Problem  The patient's pertinent negatives include no penile discharge, penile pain, scrotal swelling or testicular pain. This is a chronic problem. The current episode started more than 1 year ago. The problem occurs daily. The problem has been waxing and waning. Pertinent negatives include no constipation, diarrhea, dysuria, flank pain, frequency or urgency. Exacerbated by: S/P Prostatectomy for cancer. The  "treatment provided mild relief. His past medical history is significant for erectile dysfunction. (Prostate Cancer.)   Pain  This is a chronic problem. The current episode started more than 1 year ago. The problem occurs daily. The problem has been waxing and waning. Associated symptoms comments: Neuropathic foot and leg pain. The symptoms are aggravated by standing and walking. Treatments tried: shoe inserts. The treatment provided significant relief.   Erectile Dysfunction  He reports no anxiety. Irritative symptoms do not include frequency or urgency. Pertinent negatives include no dysuria or hematuria.   Leg Pain     Heartburn  He complains of heartburn. This is a recurrent problem. The current episode started 1 to 4 weeks ago. The problem has been gradually worsening. The heartburn is of moderate intensity. The symptoms are aggravated by certain foods. He has tried a PPI and a histamine-2 antagonist for the symptoms. The treatment provided mild relief.       Objective   Vital Signs:   /84 (BP Location: Left arm, Patient Position: Sitting, Cuff Size: Adult)   Pulse 64   Temp 97.1 °F (36.2 °C) (Temporal)   Ht 182.9 cm (72\")   Wt 93.6 kg (206 lb 6.4 oz)   SpO2 98%   BMI 27.99 kg/m²     Physical Exam  Vitals and nursing note reviewed.   Constitutional:       General: He is not in acute distress.     Appearance: Normal appearance. He is well-developed. He is not diaphoretic.   HENT:      Head: Normocephalic.      Comments: Hearing aides     Right Ear: Tympanic membrane, ear canal and external ear normal. There is no impacted cerumen.      Left Ear: Tympanic membrane, ear canal and external ear normal. There is no impacted cerumen.      Nose: Congestion and rhinorrhea present.      Comments: Tan post nasal drainage.      Mouth/Throat:      Mouth: Mucous membranes are moist.   Eyes:      General: No scleral icterus.        Right eye: No discharge.         Left eye: No discharge.      Conjunctiva/sclera: " Conjunctivae normal.      Pupils: Pupils are equal, round, and reactive to light.   Neck:      Thyroid: No thyromegaly.      Vascular: No JVD.      Trachea: No tracheal deviation.   Cardiovascular:      Rate and Rhythm: Normal rate.      Heart sounds: Normal heart sounds. No murmur heard.  No friction rub. No gallop.    Pulmonary:      Effort: Pulmonary effort is normal. No respiratory distress.      Breath sounds: Normal breath sounds. No stridor. No wheezing or rales.      Comments: Cough  Chest:      Chest wall: No tenderness.   Abdominal:      General: Bowel sounds are normal. There is no distension.      Palpations: There is no mass.      Tenderness: There is no abdominal tenderness. There is no right CVA tenderness, left CVA tenderness, guarding or rebound.      Hernia: No hernia is present.   Musculoskeletal:         General: No tenderness or deformity.   Lymphadenopathy:      Cervical: No cervical adenopathy.   Skin:     General: Skin is warm and dry.      Coloration: Skin is not pale.      Findings: No erythema or rash.   Neurological:      Mental Status: He is alert and oriented to person, place, and time.      Cranial Nerves: No cranial nerve deficit.      Motor: No abnormal muscle tone.      Coordination: Coordination normal.      Deep Tendon Reflexes: Reflexes are normal and symmetric. Reflexes normal.   Psychiatric:         Mood and Affect: Mood normal.         Behavior: Behavior normal.         Thought Content: Thought content normal.         Judgment: Judgment normal.        Result Review :                 Assessment and Plan    Diagnoses and all orders for this visit:    1. Pain in lateral portion of left knee  -     Cancel: XR Knee 1 or 2 View Left (In Office)  -     XR knee 4+ vw left    2. Bronchitis  -     azithromycin (Zithromax Z-Huan) 250 MG tablet; Take 2 tablets by mouth on day 1, then 1 tablet daily on days 2-5  Dispense: 6 tablet; Refill: 0    3. Hyperlipidemia, unspecified hyperlipidemia  type  Comments:  Lipitor  Orders:  -     atorvastatin (LIPITOR) 20 MG tablet; Take 1 tablet by mouth Every Other Day.  Dispense: 90 tablet; Refill: 1    4. Acute recurrent pansinusitis  -     cetirizine (zyrTEC) 10 MG tablet; Take 1 tablet by mouth Daily.  Dispense: 90 tablet; Refill: 1    5. Sinus problem  -     azithromycin (Zithromax Z-Huan) 250 MG tablet; Take 2 tablets by mouth on day 1, then 1 tablet daily on days 2-5  Dispense: 6 tablet; Refill: 0    6. Erectile dysfunction after radical prostatectomy  -     tadalafil (Cialis) 20 MG tablet; Take 1 tablet by mouth Daily As Needed for Erectile Dysfunction.  Dispense: 30 tablet; Refill: 2    7. Heartburn  -     sucralfate (Carafate) 1 g tablet; Take 1 tablet by mouth 4 (Four) Times a Day As Needed (Stomach pain). Stomach pain  Dispense: 120 tablet; Refill: 3        Follow Up   Return in about 3 months (around 2/22/2022).  Patient was given instructions and counseling regarding his condition or for health maintenance advice. Please see specific information pulled into the AVS if appropriate.         This document has been electronically signed by Rd Pabon MD

## 2021-11-23 RX ORDER — CETIRIZINE HYDROCHLORIDE 10 MG/1
10 TABLET ORAL DAILY
Qty: 90 TABLET | Refills: 1 | Status: SHIPPED | OUTPATIENT
Start: 2021-11-23 | End: 2022-02-22 | Stop reason: SDUPTHER

## 2021-11-23 RX ORDER — ATORVASTATIN CALCIUM 20 MG/1
20 TABLET, FILM COATED ORAL EVERY OTHER DAY
Qty: 90 TABLET | Refills: 1 | Status: SHIPPED | OUTPATIENT
Start: 2021-11-23 | End: 2022-02-22 | Stop reason: SDUPTHER

## 2021-11-23 RX ORDER — SUCRALFATE 1 G/1
1 TABLET ORAL 4 TIMES DAILY PRN
Qty: 120 TABLET | Refills: 3 | Status: SHIPPED | OUTPATIENT
Start: 2021-11-23 | End: 2022-02-22 | Stop reason: SDUPTHER

## 2021-11-24 ENCOUNTER — TELEPHONE (OUTPATIENT)
Dept: FAMILY MEDICINE CLINIC | Facility: CLINIC | Age: 61
End: 2021-11-24

## 2021-11-24 NOTE — TELEPHONE ENCOUNTER
----- Message from Rd Pabon MD sent at 11/24/2021  7:58 AM CST -----  X-ray of knee is OK   wound check

## 2022-01-05 RX ORDER — PREDNISONE 10 MG/1
10 TABLET ORAL SEE ADMIN INSTRUCTIONS
Qty: 17 TABLET | Refills: 0 | Status: SHIPPED | OUTPATIENT
Start: 2022-01-05 | End: 2022-02-22

## 2022-01-05 RX ORDER — AZITHROMYCIN 250 MG/1
TABLET, FILM COATED ORAL
Qty: 6 TABLET | Refills: 0 | Status: SHIPPED | OUTPATIENT
Start: 2022-01-05 | End: 2022-01-21

## 2022-01-21 ENCOUNTER — OFFICE VISIT (OUTPATIENT)
Dept: FAMILY MEDICINE CLINIC | Facility: CLINIC | Age: 62
End: 2022-01-21

## 2022-01-21 VITALS
HEIGHT: 72 IN | HEART RATE: 73 BPM | TEMPERATURE: 97.3 F | BODY MASS INDEX: 27.93 KG/M2 | DIASTOLIC BLOOD PRESSURE: 80 MMHG | OXYGEN SATURATION: 99 % | WEIGHT: 206.2 LBS | SYSTOLIC BLOOD PRESSURE: 150 MMHG

## 2022-01-21 DIAGNOSIS — J30.9 CHRONIC ALLERGIC RHINITIS: Primary | Chronic | ICD-10-CM

## 2022-01-21 DIAGNOSIS — R05.9 COUGH: ICD-10-CM

## 2022-01-21 DIAGNOSIS — R09.81 SINUS CONGESTION: ICD-10-CM

## 2022-01-21 DIAGNOSIS — R06.02 SHORTNESS OF BREATH: ICD-10-CM

## 2022-01-21 PROCEDURE — 96372 THER/PROPH/DIAG INJ SC/IM: CPT | Performed by: NURSE PRACTITIONER

## 2022-01-21 PROCEDURE — 99214 OFFICE O/P EST MOD 30 MIN: CPT | Performed by: NURSE PRACTITIONER

## 2022-01-21 RX ORDER — TRIAMCINOLONE ACETONIDE 1 MG/G
CREAM TOPICAL
COMMUNITY
End: 2022-09-27

## 2022-01-21 RX ORDER — TRIAMCINOLONE ACETONIDE 40 MG/ML
60 INJECTION, SUSPENSION INTRA-ARTICULAR; INTRAMUSCULAR ONCE
Status: COMPLETED | OUTPATIENT
Start: 2022-01-21 | End: 2022-01-21

## 2022-01-21 RX ORDER — CRISABOROLE 20 MG/G
OINTMENT TOPICAL
COMMUNITY
End: 2022-09-27

## 2022-01-21 RX ORDER — ALBUTEROL SULFATE 90 UG/1
2 AEROSOL, METERED RESPIRATORY (INHALATION) EVERY 4 HOURS PRN
Qty: 18 G | Refills: 0 | Status: SHIPPED | OUTPATIENT
Start: 2022-01-21 | End: 2022-02-22 | Stop reason: SDUPTHER

## 2022-01-21 RX ADMIN — TRIAMCINOLONE ACETONIDE 60 MG: 40 INJECTION, SUSPENSION INTRA-ARTICULAR; INTRAMUSCULAR at 09:05

## 2022-01-21 NOTE — PATIENT INSTRUCTIONS
Allergic Rhinitis, Adult    Allergic rhinitis is an allergic reaction that affects the mucous membrane inside the nose. The mucous membrane is the tissue that produces mucus.  There are two types of allergic rhinitis:  · Seasonal. This type is also called hay fever and happens only during certain seasons.  · Perennial. This type can happen at any time of the year.  Allergic rhinitis cannot be spread from person to person. This condition can be mild, moderate, or severe. It can develop at any age and may be outgrown.  What are the causes?  This condition is caused by allergens. These are things that can cause an allergic reaction. Allergens may differ for seasonal allergic rhinitis and perennial allergic rhinitis.  · Seasonal allergic rhinitis is triggered by pollen. Pollen can come from grasses, trees, and weeds.  · Perennial allergic rhinitis may be triggered by:  ? Dust mites.  ? Proteins in a pet's urine, saliva, or dander. Dander is dead skin cells from a pet.  ? Smoke, mold, or car fumes.  What increases the risk?  You are more likely to develop this condition if you have a family history of allergies or other conditions related to allergies, including:  · Allergic conjunctivitis. This is inflammation of parts of the eyes and eyelids.  · Asthma. This condition affects the lungs and makes it hard to breathe.  · Atopic dermatitis or eczema. This is long term (chronic) inflammation of the skin.  · Food allergies.  What are the signs or symptoms?  Symptoms of this condition include:  · Sneezing or coughing.  · A stuffy nose (nasal congestion), itchy nose, or nasal discharge.  · Itchy eyes and tearing of the eyes.  · A feeling of mucus dripping down the back of your throat (postnasal drip).  · Trouble sleeping.  · Tiredness or fatigue.  · Headache.  · Sore throat.  How is this diagnosed?  This condition may be diagnosed with your symptoms, medical history, and physical exam. Your health care provider may check for  related conditions, such as:  · Asthma.  · Pink eye. This is eye inflammation caused by infection (conjunctivitis).  · Ear infection.  · Upper respiratory infection. This is an infection in the nose, throat, or upper airways.  You may also have tests to find out which allergens trigger your symptoms. These may include skin tests or blood tests.  How is this treated?  There is no cure for this condition, but treatment can help control symptoms. Treatment may include:  · Taking medicines that block allergy symptoms, such as corticosteroids and antihistamines. Medicine may be given as a shot, nasal spray, or pill.  · Avoiding any allergens.  · Being exposed again and again to tiny amounts of allergens to help you build a defense against allergens (immunotherapy). This is done if other treatments have not helped. It may include:  ? Allergy shots. These are injected medicines that have small amounts of allergen in them.  ? Sublingual immunotherapy. This involves taking small doses of a medicine with allergen in it under your tongue.  If these treatments do not work, your health care provider may prescribe newer, stronger medicines.  Follow these instructions at home:  Avoiding allergens  Find out what you are allergic to and avoid those allergens. These are some things you can do to help avoid allergens:  · If you have perennial allergies:  ? Replace carpet with wood, tile, or vinyl trey. Carpet can trap dander and dust.  ? Do not smoke. Do not allow smoking in your home.  ? Change your heating and air conditioning filters at least once a month.  · If you have seasonal allergies, take these steps during allergy season:  ? Keep windows closed as much as possible.  ? Plan outdoor activities when pollen counts are lowest. Check pollen counts before you plan outdoor activities.  ? When coming indoors, change clothing and shower before sitting on furniture or bedding.  · If you have a pet in the house that produces  allergens:  ? Keep the pet out of the bedroom.  ? Vacuum, sweep, and dust regularly.  General instructions  · Take over-the-counter and prescription medicines only as told by your health care provider.  · Drink enough fluid to keep your urine pale yellow.  · Keep all follow-up visits as told by your health care provider. This is important.  Where to find more information  · American Academy of Allergy, Asthma & Immunology: www.aaaai.org  Contact a health care provider if:  · You have a fever.  · You develop a cough that does not go away.  · You make whistling sounds when you breathe (wheeze).  · Your symptoms slow you down or stop you from doing your normal activities each day.  Get help right away if:  · You have shortness of breath.  This symptom may represent a serious problem that is an emergency. Do not wait to see if the symptom will go away. Get medical help right away. Call your local emergency services (911 in the [place] Do not drive yourself to the hospital.  Summary  · Allergic rhinitis may be managed by taking medicines as directed and avoiding allergens.  · If you have seasonal allergies, keep windows closed as much as possible during allergy season.  · Contact your health care provider if you develop a fever or a cough that does not go away.  This information is not intended to replace advice given to you by your health care provider. Make sure you discuss any questions you have with your health care provider.  Document Revised: 02/05/2021 Document Reviewed: 12/15/2020  Elsevier Patient Education © 2021 [employer]      Sinusitis, Adult  Sinusitis is inflammation of your sinuses. Sinuses are hollow spaces in the bones around your face. Your sinuses are located:  · Around your eyes.  · In the middle of your forehead.  · Behind your nose.  · In your cheekbones.  Mucus normally drains out of your sinuses. When your nasal tissues become inflamed or swollen, mucus can become trapped or blocked. This  allows bacteria, viruses, and fungi to grow, which leads to infection. Most infections of the sinuses are caused by a virus.  Sinusitis can develop quickly. It can last for up to 4 weeks (acute) or for more than 12 weeks (chronic). Sinusitis often develops after a cold.  What are the causes?  This condition is caused by anything that creates swelling in the sinuses or stops mucus from draining. This includes:  · Allergies.  · Asthma.  · Infection from bacteria or viruses.  · Deformities or blockages in your nose or sinuses.  · Abnormal growths in the nose (nasal polyps).  · Pollutants, such as chemicals or irritants in the air.  · Infection from fungi (rare).  What increases the risk?  You are more likely to develop this condition if you:  · Have a weak body defense system (immune system).  · Do a lot of swimming or diving.  · Overuse nasal sprays.  · Smoke.  What are the signs or symptoms?  The main symptoms of this condition are pain and a feeling of pressure around the affected sinuses. Other symptoms include:  · Stuffy nose or congestion.  · Thick drainage from your nose.  · Swelling and warmth over the affected sinuses.  · Headache.  · Upper toothache.  · A cough that may get worse at night.  · Extra mucus that collects in the throat or the back of the nose (postnasal drip).  · Decreased sense of smell and taste.  · Fatigue.  · A fever.  · Sore throat.  · Bad breath.  How is this diagnosed?  This condition is diagnosed based on:  · Your symptoms.  · Your medical history.  · A physical exam.  · Tests to find out if your condition is acute or chronic. This may include:  ? Checking your nose for nasal polyps.  ? Viewing your sinuses using a device that has a light (endoscope).  ? Testing for allergies or bacteria.  ? Imaging tests, such as an MRI or CT scan.  In rare cases, a bone biopsy may be done to rule out more serious types of fungal sinus disease.  How is this treated?  Treatment for sinusitis depends on  the cause and whether your condition is chronic or acute.  · If caused by a virus, your symptoms should go away on their own within 10 days. You may be given medicines to relieve symptoms. They include:  ? Medicines that shrink swollen nasal passages (topical intranasal decongestants).  ? Medicines that treat allergies (antihistamines).  ? A spray that eases inflammation of the nostrils (topical intranasal corticosteroids).  ? Rinses that help get rid of thick mucus in your nose (nasal saline washes).  · If caused by bacteria, your health care provider may recommend waiting to see if your symptoms improve. Most bacterial infections will get better without antibiotic medicine. You may be given antibiotics if you have:  ? A severe infection.  ? A weak immune system.  · If caused by narrow nasal passages or nasal polyps, you may need to have surgery.  Follow these instructions at home:  Medicines  · Take, use, or apply over-the-counter and prescription medicines only as told by your health care provider. These may include nasal sprays.  · If you were prescribed an antibiotic medicine, take it as told by your health care provider. Do not stop taking the antibiotic even if you start to feel better.  Hydrate and humidify    · Drink enough fluid to keep your urine pale yellow. Staying hydrated will help to thin your mucus.  · Use a cool mist humidifier to keep the humidity level in your home above 50%.  · Inhale steam for 10-15 minutes, 3-4 times a day, or as told by your health care provider. You can do this in the bathroom while a hot shower is running.  · Limit your exposure to cool or dry air.    Rest  · Rest as much as possible.  · Sleep with your head raised (elevated).  · Make sure you get enough sleep each night.  General instructions    · Apply a warm, moist washcloth to your face 3-4 times a day or as told by your health care provider. This will help with discomfort.  · Wash your hands often with soap and water  to reduce your exposure to germs. If soap and water are not available, use hand .  · Do not smoke. Avoid being around people who are smoking (secondhand smoke).  · Keep all follow-up visits as told by your health care provider. This is important.    Contact a health care provider if:  · You have a fever.  · Your symptoms get worse.  · Your symptoms do not improve within 10 days.  Get help right away if:  · You have a severe headache.  · You have persistent vomiting.  · You have severe pain or swelling around your face or eyes.  · You have vision problems.  · You develop confusion.  · Your neck is stiff.  · You have trouble breathing.  Summary  · Sinusitis is soreness and inflammation of your sinuses. Sinuses are hollow spaces in the bones around your face.  · This condition is caused by nasal tissues that become inflamed or swollen. The swelling traps or blocks the flow of mucus. This allows bacteria, viruses, and fungi to grow, which leads to infection.  · If you were prescribed an antibiotic medicine, take it as told by your health care provider. Do not stop taking the antibiotic even if you start to feel better.  · Keep all follow-up visits as told by your health care provider. This is important.  This information is not intended to replace advice given to you by your health care provider. Make sure you discuss any questions you have with your health care provider.  Document Revised: 05/20/2019 Document Reviewed: 05/20/2019  [employer] Patient Education © 2021 [employer]

## 2022-01-21 NOTE — PROGRESS NOTES
"Chief Complaint  Sore Throat and Cough    Subjective          Guevara Meza presents to [facility]    PCP: Dr. Pabon    CC: \"sore throat, cough, sinus congestion\"    Symptoms x 2 weeks.  Treated with Zithromax by PCP on 1-5-2022..  Prednisone was also sent in, but he didn't take.  Denies fever.  Has some mild dyspnea with activity, burning in chest since yesterday.  All sputum, rhinorrhea is clear.  Has chronic allergic rhinitis, using singulair, zyrtec, astelin.  Has seen ENT and allergist before, never had sinus surgery or allergy immunotherapy recommended.     Sinusitis  This is a recurrent problem. Episode onset: started over 2 weeks ago. Progression since onset: improved some, but now worried it is moving into chest. There has been no fever. He is experiencing no pain. Associated symptoms include congestion, coughing ( dry), shortness of breath (mild, yesterday, some burning), sinus pressure ( generalized) and a sore throat (scratchy, burning). Pertinent negatives include no chills, diaphoresis, ear pain, headaches, hoarse voice, neck pain, sneezing or swollen glands. Treatments tried: allergy meds, coricidan, zithromax. The treatment provided mild relief.       Review of Systems   Constitutional: Negative.  Negative for chills and diaphoresis.   HENT: Positive for congestion, postnasal drip, rhinorrhea (clear, watery), sinus pressure ( generalized) and sore throat (scratchy, burning). Negative for ear discharge, ear pain, hoarse voice, sinus pain, sneezing and trouble swallowing.    Eyes: Negative.    Respiratory: Positive for cough ( dry) and shortness of breath (mild, yesterday, some burning). Negative for chest tightness and wheezing.    Cardiovascular: Negative.    Gastrointestinal: Negative.    Genitourinary: Negative.    Musculoskeletal: Negative.  Negative for neck pain.   Skin: Negative.    Neurological: " "Negative for dizziness and headaches.        Objective   Vital Signs:   /80 (BP Location: Left arm, Patient Position: Sitting, Cuff Size: Adult)   Pulse 73   Temp 97.3 °F (36.3 °C) (Temporal)   Ht 182.9 cm (72\")   Wt 93.5 kg (206 lb 3.2 oz)   SpO2 99%   BMI 27.97 kg/m²       Physical Exam  Vitals and nursing note reviewed.   Constitutional:       General: He is not in acute distress.     Appearance: Normal appearance. He is not ill-appearing.   HENT:      Head: Normocephalic and atraumatic.      Right Ear: Tympanic membrane and ear canal normal. Decreased hearing (HA present, reomved for exam) noted.      Left Ear: Tympanic membrane and ear canal normal. Decreased hearing (HA present, removed for exam) noted.      Nose: Congestion and rhinorrhea ( clear, watery) present.      Comments: Generalized sinus pressure, no localized pain       Mouth/Throat:      Mouth: Mucous membranes are moist.      Pharynx: Oropharynx is clear. No oropharyngeal exudate or posterior oropharyngeal erythema.   Eyes:      General:         Right eye: No discharge.         Left eye: No discharge.      Conjunctiva/sclera: Conjunctivae normal.   Cardiovascular:      Rate and Rhythm: Normal rate and regular rhythm.   Pulmonary:      Effort: Pulmonary effort is normal. No respiratory distress.      Breath sounds: No wheezing, rhonchi or rales.      Comments: Slightly tight cough noted  Musculoskeletal:      Cervical back: Neck supple. No tenderness.   Lymphadenopathy:      Cervical: No cervical adenopathy.   Skin:     General: Skin is warm and dry.   Neurological:      General: No focal deficit present.      Mental Status: He is alert and oriented to person, place, and time.   Psychiatric:         Mood and Affect: Mood normal.         Thought Content: Thought content normal.          Result Review :     Common labs    Common Labsle 10/20/21 10/20/21 10/20/21 10/20/21    0817 0817 0817 0817   Glucose  87     BUN  10     Creatinine  " 0.99     eGFR Non African Am  76     Sodium  139     Potassium  4.0     Chloride  103     Calcium  9.1     Albumin  4.40     Total Bilirubin  0.5     Alkaline Phosphatase  70     AST (SGOT)  15     ALT (SGPT)  14     WBC 5.40      Hemoglobin 14.8      Hematocrit 41.7      Platelets 231      Total Cholesterol   192    Triglycerides   57    HDL Cholesterol   56    LDL Cholesterol    125 (A)    PSA    <0.014   (A) Abnormal value                      Assessment and Plan    Diagnoses and all orders for this visit:    1. Chronic allergic rhinitis (Primary)  -     triamcinolone acetonide (KENALOG-40) injection 60 mg    2. Shortness of breath  -     albuterol sulfate  (90 Base) MCG/ACT inhaler; Inhale 2 puffs Every 4 (Four) Hours As Needed for Wheezing or Shortness of Air.  Dispense: 18 g; Refill: 0    3. Cough  -     albuterol sulfate  (90 Base) MCG/ACT inhaler; Inhale 2 puffs Every 4 (Four) Hours As Needed for Wheezing or Shortness of Air.  Dispense: 18 g; Refill: 0    4. Sinus congestion  -     triamcinolone acetonide (KENALOG-40) injection 60 mg    Symptoms > 2 weeks, declines Covid testing    Kenalog 60 mg IM x 1 in office--instructed to Hold prednisone--has not started  Continue with allergy meds, Coricidan PRN  Rx for Albuterol PRN    See PCP or RTC if symptoms persist/worsen  See PCP for routine f/u visit and management of chronic medical conditions      This document has been electronically signed by RON Stallworth on January 21, 2022 09:19 CST,.    I spent 35 minutes caring for Guevara on this date of service. This time includes time spent by me in the following activities:preparing for the visit, reviewing tests, performing a medically appropriate examination and/or evaluation , counseling and educating the patient/family/caregiver and documenting information in the medical record

## 2022-02-07 RX ORDER — AZELASTINE HYDROCHLORIDE 137 UG/1
SPRAY, METERED NASAL
Qty: 30 ML | Refills: 11 | OUTPATIENT
Start: 2022-02-07

## 2022-02-07 NOTE — TELEPHONE ENCOUNTER
Patient never scheduled follow-up visit.  Will not refill medications until follow-up has been scheduled

## 2022-02-22 ENCOUNTER — OFFICE VISIT (OUTPATIENT)
Dept: FAMILY MEDICINE CLINIC | Facility: CLINIC | Age: 62
End: 2022-02-22

## 2022-02-22 VITALS
SYSTOLIC BLOOD PRESSURE: 118 MMHG | HEART RATE: 73 BPM | DIASTOLIC BLOOD PRESSURE: 80 MMHG | HEIGHT: 72 IN | WEIGHT: 203.3 LBS | OXYGEN SATURATION: 99 % | TEMPERATURE: 97.3 F | BODY MASS INDEX: 27.54 KG/M2

## 2022-02-22 DIAGNOSIS — R12 HEART BURN: ICD-10-CM

## 2022-02-22 DIAGNOSIS — R12 HEARTBURN: ICD-10-CM

## 2022-02-22 DIAGNOSIS — R06.02 SHORTNESS OF BREATH: ICD-10-CM

## 2022-02-22 DIAGNOSIS — I10 ESSENTIAL HYPERTENSION: ICD-10-CM

## 2022-02-22 DIAGNOSIS — M79.672 BILATERAL LEG AND FOOT PAIN: ICD-10-CM

## 2022-02-22 DIAGNOSIS — R05.9 COUGH: ICD-10-CM

## 2022-02-22 DIAGNOSIS — E78.5 HYPERLIPIDEMIA, UNSPECIFIED HYPERLIPIDEMIA TYPE: ICD-10-CM

## 2022-02-22 DIAGNOSIS — J01.41 ACUTE RECURRENT PANSINUSITIS: ICD-10-CM

## 2022-02-22 DIAGNOSIS — M79.671 BILATERAL LEG AND FOOT PAIN: ICD-10-CM

## 2022-02-22 DIAGNOSIS — M79.2 NEUROPATHIC PAIN: ICD-10-CM

## 2022-02-22 DIAGNOSIS — M79.604 RIGHT LEG PAIN: ICD-10-CM

## 2022-02-22 DIAGNOSIS — M79.604 BILATERAL LEG AND FOOT PAIN: ICD-10-CM

## 2022-02-22 DIAGNOSIS — J30.9 CHRONIC ALLERGIC RHINITIS: Chronic | ICD-10-CM

## 2022-02-22 DIAGNOSIS — M79.605 BILATERAL LEG AND FOOT PAIN: ICD-10-CM

## 2022-02-22 PROCEDURE — 99214 OFFICE O/P EST MOD 30 MIN: CPT | Performed by: FAMILY MEDICINE

## 2022-02-22 RX ORDER — ALBUTEROL SULFATE 90 UG/1
2 AEROSOL, METERED RESPIRATORY (INHALATION) EVERY 4 HOURS PRN
Qty: 18 G | Refills: 0 | Status: SHIPPED | OUTPATIENT
Start: 2022-02-22 | End: 2022-06-22 | Stop reason: SDUPTHER

## 2022-02-22 RX ORDER — PREDNISONE 10 MG/1
10 TABLET ORAL SEE ADMIN INSTRUCTIONS
Qty: 17 TABLET | Refills: 0 | Status: SHIPPED | OUTPATIENT
Start: 2022-02-22 | End: 2022-06-22

## 2022-02-22 RX ORDER — AZELASTINE 1 MG/ML
2 SPRAY, METERED NASAL 2 TIMES DAILY
Qty: 30 ML | Refills: 6 | Status: SHIPPED | OUTPATIENT
Start: 2022-02-22 | End: 2022-06-22 | Stop reason: SDUPTHER

## 2022-02-22 RX ORDER — DOXYCYCLINE 100 MG/1
100 CAPSULE ORAL 2 TIMES DAILY
Qty: 20 CAPSULE | Refills: 0 | Status: SHIPPED | OUTPATIENT
Start: 2022-02-22 | End: 2022-05-04 | Stop reason: SDUPTHER

## 2022-02-22 RX ORDER — GABAPENTIN 300 MG/1
300 CAPSULE ORAL 3 TIMES DAILY
Qty: 90 CAPSULE | Refills: 3 | Status: SHIPPED | OUTPATIENT
Start: 2022-02-22 | End: 2022-06-22 | Stop reason: SDUPTHER

## 2022-02-22 NOTE — PROGRESS NOTES
Chief Complaint  Hyperlipidemia, Hypertension, Insomnia, Heartburn, Pain, and Sinusitis    Subjective          Review of Systems   Constitutional: Negative for activity change, appetite change and unexpected weight change.   HENT: Positive for hearing loss, sinus pressure, sinus pain and tinnitus. Negative for dental problem, drooling, ear discharge, ear pain, facial swelling, mouth sores, nosebleeds, postnasal drip, rhinorrhea, sneezing, trouble swallowing and voice change.    Eyes: Positive for itching. Negative for photophobia, pain, discharge, redness and visual disturbance.        Wears glasses     Eye exams with Dr. Glaser   Respiratory: Negative for apnea, chest tightness and stridor.    Cardiovascular: Negative for palpitations and leg swelling.   Gastrointestinal: Positive for heartburn. Negative for abdominal distention, anal bleeding, blood in stool, constipation, diarrhea and rectal pain.        Rectal stenosis   Endocrine: Negative for cold intolerance, heat intolerance, polydipsia, polyphagia and polyuria.   Genitourinary: Positive for enuresis. Negative for decreased urine volume, difficulty urinating, dysuria, flank pain, frequency, genital sores, hematuria, penile discharge, penile pain, penile swelling, scrotal swelling, testicular pain and urgency.        Small R hernia felt in scrotum at times, told OK    Musculoskeletal: Positive for back pain. Negative for gait problem and neck stiffness.        R foot, Plantar faciitis.  R calf pain.     L5 DDD  Occasional back pain more on R    R shoulder pain   Skin: Negative for color change, pallor and wound.        Eczema   Allergic/Immunologic: Positive for environmental allergies. Negative for food allergies and immunocompromised state.   Neurological: Positive for tremors. Negative for dizziness, seizures, syncope, facial asymmetry, speech difficulty and light-headedness.   Hematological: Negative for adenopathy. Bruises/bleeds easily.    Psychiatric/Behavioral: Positive for sleep disturbance. Negative for agitation, behavioral problems, confusion, decreased concentration, dysphoric mood, hallucinations, self-injury and suicidal ideas. The patient is not nervous/anxious and is not hyperactive.         Neurotin helps with pain and sleep,      Sleeps 5 hrs with med.        Guevara Meza presents to [facility] [place]  Allergies  This is a chronic problem. The current episode started more than 1 year ago. The problem has been waxing and waning. The symptoms are aggravated by coughing. Treatments tried: Antihistamines. The treatment provided mild relief.   Hypertension  This is a chronic problem. The current episode started more than 1 year ago. The problem is controlled. Pertinent negatives include no palpitations. Agents associated with hypertension include decongestants and NSAIDs. Risk factors for coronary artery disease include male gender and dyslipidemia. Past treatments include ACE inhibitors. Current antihypertension treatment includes ACE inhibitors. The current treatment provides significant improvement.   Sinusitis  This is a chronic (Bronchitis) problem. The current episode started in the past 7 days. The problem has been gradually worsening since onset. His pain is at a severity of 5/10. The pain is moderate. Associated symptoms include sinus pressure. Pertinent negatives include no ear pain or sneezing. Treatments tried: OTC Cold med. The treatment provided no relief.   Male  Problem  The patient's pertinent negatives include no penile discharge, penile pain, scrotal swelling or testicular pain. This is a chronic problem. The current episode started more than 1 year ago. The problem occurs daily. The problem has been waxing and waning. Pertinent negatives include no constipation, diarrhea, dysuria, flank pain, frequency or urgency. Exacerbated by: S/P Prostatectomy for cancer. The  "treatment provided mild relief. His past medical history is significant for erectile dysfunction. (Prostate Cancer.)   Pain  This is a chronic problem. The current episode started more than 1 year ago. The problem occurs daily. The problem has been waxing and waning. Associated symptoms comments: Neuropathic foot and leg pain. The symptoms are aggravated by standing and walking. Treatments tried: shoe inserts. The treatment provided significant relief.   Erectile Dysfunction  He reports no anxiety. Irritative symptoms do not include frequency or urgency. Pertinent negatives include no dysuria or hematuria.   Leg Pain   There was no injury mechanism. The pain is present in the right leg and left leg. The pain is at a severity of 6/10. The pain is moderate. He has tried rest (Neurontin) for the symptoms. The treatment provided mild relief.   Heartburn  He complains of heartburn. This is a recurrent problem. The current episode started 1 to 4 weeks ago. The problem has been gradually worsening. The heartburn is of moderate intensity. The symptoms are aggravated by certain foods. He has tried a PPI and a histamine-2 antagonist for the symptoms. The treatment provided mild relief.       Objective   Vital Signs:   /80 (BP Location: Left arm, Patient Position: Sitting, Cuff Size: Adult)   Pulse 73   Temp 97.3 °F (36.3 °C) (Temporal)   Ht 182.9 cm (72\")   Wt 92.2 kg (203 lb 4.8 oz)   SpO2 99%   BMI 27.57 kg/m²     Physical Exam  Vitals and nursing note reviewed.   Constitutional:       General: He is not in acute distress.     Appearance: Normal appearance. He is well-developed. He is not diaphoretic.   HENT:      Head: Normocephalic.      Comments: Hearing aides     Right Ear: Tympanic membrane, ear canal and external ear normal. There is no impacted cerumen.      Left Ear: Tympanic membrane, ear canal and external ear normal. There is no impacted cerumen.      Nose: Congestion and rhinorrhea present.      " Comments: Tan post nasal drainage.      Mouth/Throat:      Mouth: Mucous membranes are moist.   Eyes:      General: No scleral icterus.        Right eye: No discharge.         Left eye: No discharge.      Conjunctiva/sclera: Conjunctivae normal.      Pupils: Pupils are equal, round, and reactive to light.   Neck:      Thyroid: No thyromegaly.      Vascular: No JVD.      Trachea: No tracheal deviation.   Cardiovascular:      Rate and Rhythm: Normal rate.      Heart sounds: Normal heart sounds. No murmur heard.  No friction rub. No gallop.    Pulmonary:      Effort: Pulmonary effort is normal. No respiratory distress.      Breath sounds: Normal breath sounds. No stridor. No wheezing or rales.      Comments: Cough  Chest:      Chest wall: No tenderness.   Abdominal:      General: Bowel sounds are normal. There is no distension.      Palpations: There is no mass.      Tenderness: There is no abdominal tenderness. There is no right CVA tenderness, left CVA tenderness, guarding or rebound.      Hernia: No hernia is present.   Musculoskeletal:         General: No tenderness or deformity.   Lymphadenopathy:      Cervical: No cervical adenopathy.   Skin:     General: Skin is warm and dry.      Capillary Refill: Capillary refill takes 2 to 3 seconds.      Coloration: Skin is not pale.      Findings: No erythema or rash.   Neurological:      General: No focal deficit present.      Mental Status: He is alert and oriented to person, place, and time.      Cranial Nerves: No cranial nerve deficit.      Motor: No weakness or abnormal muscle tone.      Coordination: Coordination normal.      Gait: Gait normal.      Deep Tendon Reflexes: Reflexes are normal and symmetric. Reflexes normal.   Psychiatric:         Mood and Affect: Mood normal.         Behavior: Behavior normal.         Thought Content: Thought content normal.         Judgment: Judgment normal.        Result Review :                 Assessment and Plan    Diagnoses and  all orders for this visit:    1. Right leg pain  -     gabapentin (NEURONTIN) 300 MG capsule; Take 1 capsule by mouth 3 (Three) Times a Day.  Dispense: 90 capsule; Refill: 3    2. Bilateral leg and foot pain  -     gabapentin (NEURONTIN) 300 MG capsule; Take 1 capsule by mouth 3 (Three) Times a Day.  Dispense: 90 capsule; Refill: 3    3. Neuropathic pain  -     gabapentin (NEURONTIN) 300 MG capsule; Take 1 capsule by mouth 3 (Three) Times a Day.  Dispense: 90 capsule; Refill: 3    4. Shortness of breath  -     albuterol sulfate  (90 Base) MCG/ACT inhaler; Inhale 2 puffs Every 4 (Four) Hours As Needed for Wheezing or Shortness of Air.  Dispense: 18 g; Refill: 0    5. Cough  -     albuterol sulfate  (90 Base) MCG/ACT inhaler; Inhale 2 puffs Every 4 (Four) Hours As Needed for Wheezing or Shortness of Air.  Dispense: 18 g; Refill: 0    6. Hyperlipidemia, unspecified hyperlipidemia type  Comments:  Lipitor  Orders:  -     atorvastatin (LIPITOR) 20 MG tablet; Take 1 tablet by mouth Every Other Day.  Dispense: 90 tablet; Refill: 1    7. Acute recurrent pansinusitis  -     predniSONE (DELTASONE) 10 MG tablet; Take 1 tablet by mouth See Admin Instructions. Take 1 Tab Tid x3 days, Then 1 Tab Bid x 2 days Then 1 Tab QD x4 days,then D/C  Dispense: 17 tablet; Refill: 0  -     doxycycline (MONODOX) 100 MG capsule; Take 1 capsule by mouth 2 (Two) Times a Day. Start at first indication of Sinusitis  Dispense: 20 capsule; Refill: 0  -     cetirizine (zyrTEC) 10 MG tablet; Take 1 tablet by mouth Daily.  Dispense: 90 tablet; Refill: 1    8. Chronic allergic rhinitis  -     azelastine (ASTELIN) 0.1 % nasal spray; 2 sprays into the nostril(s) as directed by provider 2 (Two) Times a Day. Use in each nostril as directed  Dispense: 30 mL; Refill: 6  -     montelukast (Singulair) 10 MG tablet; Take 1 tablet by mouth Every Night.  Dispense: 90 tablet; Refill: 1    9. Heart burn  -     omeprazole (priLOSEC) 40 MG capsule; Take 1  capsule by mouth Daily.  Dispense: 90 capsule; Refill: 1    10. Essential hypertension  -     ramipril (ALTACE) 10 MG capsule; Take 1 capsule by mouth Daily.  Dispense: 90 capsule; Refill: 1    11. Heartburn  -     sucralfate (Carafate) 1 g tablet; Take 1 tablet by mouth 4 (Four) Times a Day As Needed (Stomach pain). Stomach pain  Dispense: 120 tablet; Refill: 3        Follow Up   Return in about 4 months (around 6/22/2022).  Patient was given instructions and counseling regarding his condition or for health maintenance advice. Please see specific information pulled into the AVS if appropriate.         This document has been electronically signed by Rd Pabon MD

## 2022-02-25 RX ORDER — OMEPRAZOLE 40 MG/1
40 CAPSULE, DELAYED RELEASE ORAL DAILY
Qty: 90 CAPSULE | Refills: 1 | Status: SHIPPED | OUTPATIENT
Start: 2022-02-25 | End: 2022-06-22 | Stop reason: SDUPTHER

## 2022-02-25 RX ORDER — SUCRALFATE 1 G/1
1 TABLET ORAL 4 TIMES DAILY PRN
Qty: 120 TABLET | Refills: 3 | Status: SHIPPED | OUTPATIENT
Start: 2022-02-25 | End: 2022-06-22 | Stop reason: SDUPTHER

## 2022-02-25 RX ORDER — RAMIPRIL 10 MG/1
10 CAPSULE ORAL DAILY
Qty: 90 CAPSULE | Refills: 1 | Status: SHIPPED | OUTPATIENT
Start: 2022-02-25 | End: 2022-06-22 | Stop reason: SDUPTHER

## 2022-02-25 RX ORDER — ATORVASTATIN CALCIUM 20 MG/1
20 TABLET, FILM COATED ORAL EVERY OTHER DAY
Qty: 90 TABLET | Refills: 1 | Status: SHIPPED | OUTPATIENT
Start: 2022-02-25 | End: 2022-06-22 | Stop reason: SINTOL

## 2022-02-25 RX ORDER — CETIRIZINE HYDROCHLORIDE 10 MG/1
10 TABLET ORAL DAILY
Qty: 90 TABLET | Refills: 1 | Status: SHIPPED | OUTPATIENT
Start: 2022-02-25 | End: 2022-06-22 | Stop reason: SDUPTHER

## 2022-02-25 RX ORDER — MONTELUKAST SODIUM 10 MG/1
10 TABLET ORAL NIGHTLY
Qty: 90 TABLET | Refills: 1 | Status: SHIPPED | OUTPATIENT
Start: 2022-02-25 | End: 2022-06-22 | Stop reason: SDUPTHER

## 2022-03-23 RX ORDER — CYCLOBENZAPRINE HCL 10 MG
10 TABLET ORAL NIGHTLY PRN
Qty: 90 TABLET | Refills: 2 | Status: SHIPPED | OUTPATIENT
Start: 2022-03-23 | End: 2022-06-22 | Stop reason: SDUPTHER

## 2022-05-04 DIAGNOSIS — J01.41 ACUTE RECURRENT PANSINUSITIS: ICD-10-CM

## 2022-05-04 RX ORDER — DOXYCYCLINE 100 MG/1
100 CAPSULE ORAL 2 TIMES DAILY
Qty: 20 CAPSULE | Refills: 0 | Status: SHIPPED | OUTPATIENT
Start: 2022-05-04 | End: 2022-06-22

## 2022-05-05 ENCOUNTER — OFFICE VISIT (OUTPATIENT)
Dept: FAMILY MEDICINE CLINIC | Facility: CLINIC | Age: 62
End: 2022-05-05

## 2022-05-05 VITALS
TEMPERATURE: 95.2 F | OXYGEN SATURATION: 97 % | HEART RATE: 66 BPM | HEIGHT: 72 IN | DIASTOLIC BLOOD PRESSURE: 82 MMHG | WEIGHT: 203.2 LBS | SYSTOLIC BLOOD PRESSURE: 130 MMHG | BODY MASS INDEX: 27.52 KG/M2

## 2022-05-05 DIAGNOSIS — R31.29 OTHER MICROSCOPIC HEMATURIA: ICD-10-CM

## 2022-05-05 DIAGNOSIS — J32.9 SINUSITIS, UNSPECIFIED CHRONICITY, UNSPECIFIED LOCATION: ICD-10-CM

## 2022-05-05 DIAGNOSIS — M54.9 RIGHT-SIDED BACK PAIN, UNSPECIFIED BACK LOCATION, UNSPECIFIED CHRONICITY: Primary | ICD-10-CM

## 2022-05-05 DIAGNOSIS — M51.36 DDD (DEGENERATIVE DISC DISEASE), LUMBAR: Chronic | ICD-10-CM

## 2022-05-05 LAB
BILIRUB BLD-MCNC: NEGATIVE MG/DL
CLARITY, POC: CLEAR
COLOR UR: YELLOW
EXPIRATION DATE: ABNORMAL
GLUCOSE UR STRIP-MCNC: NEGATIVE MG/DL
KETONES UR QL: NEGATIVE
LEUKOCYTE EST, POC: NEGATIVE
Lab: ABNORMAL
NITRITE UR-MCNC: NEGATIVE MG/ML
PH UR: 5.5 [PH] (ref 5–8)
PROT UR STRIP-MCNC: NEGATIVE MG/DL
RBC # UR STRIP: ABNORMAL /UL
SP GR UR: 1.03 (ref 1–1.03)
UROBILINOGEN UR QL: NORMAL

## 2022-05-05 PROCEDURE — 87086 URINE CULTURE/COLONY COUNT: CPT | Performed by: NURSE PRACTITIONER

## 2022-05-05 PROCEDURE — 99214 OFFICE O/P EST MOD 30 MIN: CPT | Performed by: NURSE PRACTITIONER

## 2022-05-05 PROCEDURE — 81003 URINALYSIS AUTO W/O SCOPE: CPT | Performed by: NURSE PRACTITIONER

## 2022-05-05 RX ORDER — DOXYCYCLINE HYCLATE 100 MG/1
CAPSULE ORAL
COMMUNITY
Start: 2022-02-22 | End: 2022-09-27

## 2022-05-05 NOTE — PROGRESS NOTES
"Chief Complaint  Urinary Tract Infection    Subjective          Guevara Meza presents to [facility]    PCP: Dr. Pabon    CC: \"possible UTI\"    C/O right sided back pain the last few days, concerned about possible UTI.  Hx of urinary infections in the past.  Hx of hematuria, has been evaluated by urology and had CT and no abnormalities/stones noted. Denies dysuria, urgency or frequency.  Reports he has been drinking plenty of water.  Does report he used to go to the chiropractor for his back, but hasn't been in a while, so may be coming from his spine.      Also c/o chronic allergy/sinus problems.  Reports he noted some bloody mucus from left nostril in the last few days with increased sinus pressure.  Notified PCP and reports Doxycycline 100 mg BID x 10 days was called in, has not started taking yet.  Had started Coricidan and it seems to help with symptoms some.  Also taking Singulair, zyrtec, astelin and saline rinses daily.       Review of Systems   Constitutional: Negative.    HENT: Positive for congestion, postnasal drip, rhinorrhea (bloody mucus on left), sinus pressure ( left side) and sore throat (scratchy). Negative for ear discharge, ear pain, sneezing and trouble swallowing.    Eyes: Negative.    Respiratory: Negative.    Cardiovascular: Negative.    Gastrointestinal: Negative.    Genitourinary: Negative.    Musculoskeletal: Positive for back pain (right sided pain--mid to lower back).   Skin: Negative.    Neurological: Negative for dizziness and headaches.        Objective   Vital Signs:   /82 (BP Location: Left arm, Patient Position: Sitting, Cuff Size: Adult)   Pulse 66   Temp 95.2 °F (35.1 °C)   Ht 182.9 cm (72\")   Wt 92.2 kg (203 lb 3.2 oz)   SpO2 97%   BMI 27.56 kg/m²       Physical Exam  Vitals and nursing note reviewed.   Constitutional:       General: He is not in acute distress.     " Appearance: Normal appearance. He is not ill-appearing.   HENT:      Head: Normocephalic and atraumatic.      Right Ear: Tympanic membrane normal. Decreased hearing (hearing aid present, removed for exam) noted.      Left Ear: Tympanic membrane normal. Decreased hearing ( hearing aid present, removed for exam) noted.      Nose: Mucosal edema and congestion present.      Right Sinus: No maxillary sinus tenderness or frontal sinus tenderness.      Left Sinus: Maxillary sinus tenderness and frontal sinus tenderness present.      Mouth/Throat:      Mouth: Mucous membranes are moist.      Pharynx: Posterior oropharyngeal erythema ( mild injection with large amount PND) present. No pharyngeal swelling or oropharyngeal exudate.   Eyes:      General:         Right eye: No discharge.         Left eye: No discharge.      Conjunctiva/sclera: Conjunctivae normal.   Cardiovascular:      Rate and Rhythm: Normal rate and regular rhythm.   Pulmonary:      Effort: Pulmonary effort is normal. No respiratory distress.      Breath sounds: Normal breath sounds. No wheezing or rales.   Abdominal:      General: Bowel sounds are normal.      Palpations: Abdomen is soft.      Tenderness: There is no abdominal tenderness. There is no right CVA tenderness, left CVA tenderness, guarding or rebound.   Musculoskeletal:         General: Tenderness present.      Cervical back: Neck supple. No tenderness.      Thoracic back: Tenderness present.      Lumbar back: Tenderness present.        Back:    Lymphadenopathy:      Cervical: No cervical adenopathy.   Skin:     General: Skin is warm and dry.      Findings: No rash.   Neurological:      General: No focal deficit present.      Mental Status: He is alert and oriented to person, place, and time.   Psychiatric:         Mood and Affect: Mood normal.         Thought Content: Thought content normal.          Result Review :     Common labs    Common Labsle 10/20/21 10/20/21 10/20/21 10/20/21    [id number]  0817 [id number] 0817   Glucose  87     BUN  10     Creatinine  0.99     eGFR Non African Am  76     Sodium  139     Potassium  4.0     Chloride  103     Calcium  9.1     Albumin  4.40     Total Bilirubin  0.5     Alkaline Phosphatase  70     AST (SGOT)  15     ALT (SGPT)  14     WBC 5.40      Hemoglobin 14.8      Hematocrit 41.7      Platelets 231      Total Cholesterol   192    Triglycerides   57    HDL Cholesterol   56    LDL Cholesterol    125 (A)    PSA    <0.014   (A) Abnormal value            Recent Results (from the past 24 hour(s))   POCT urinalysis dipstick, automated    Collection Time: 05/05/22  2:47 PM    Specimen: Urine   Result Value Ref Range    Color Yellow Yellow, Straw, Dark Yellow, Michelle    Clarity, UA Clear Clear    Specific Gravity  1.030 1.005 - 1.030    pH, Urine 5.5 5.0 - 8.0    Leukocytes Negative Negative    Nitrite, UA Negative Negative    Protein, POC Negative Negative mg/dL    Glucose, UA Negative Negative, 1000 mg/dL (3+) mg/dL    Ketones, UA Negative Negative    Urobilinogen, UA Normal Normal    Bilirubin Negative Negative    Blood, UA 1+ (A) Negative    Lot Number [id number]     Expiration Date [id number]             [facility] [place]     Patient Name: ROLANDO DE GUZMAN     ATTENDING: PAULA FRENCH      REFERRING: PAULA FRENCH     ORDERING: PAULA FRENCH     -----------------------     PROCEDURE: Lumbar spine     DATE OF EXAM: 10/18/2019     HISTORY: Right-sided sciatica, no known injury     AP and lateral views lumbar spine show no fractures or acute  abnormalities. No compression deformities are evident. There is  degenerative narrowing of the disc space with spurring at L4-5.  There is also hypertrophic facet degenerative changes at L4-5 and  L5-S1. Alignment is satisfactorily preserved.     IMPRESSION:  Degenerative disc changes and facet degenerative  changes in lower lumbar spine. No fractures or acute bony  abnormalities are seen.           Electronically  signed by:  Alexis Tejeda MD  10/18/2019 5:22 PM  CDT Workstation: [id number]     Assessment and Plan    Diagnoses and all orders for this visit:    1. Right-sided back pain, unspecified back location, unspecified chronicity (Primary)  -     POCT urinalysis dipstick, automated    2. Sinusitis, unspecified chronicity, unspecified location    3. Other microscopic hematuria  -     Urine Culture - Urine, Urine, Clean Catch    4. DDD (degenerative disc disease), lumbar    Urine culture pending--will notify with results when available  Stay well hydrated, drink plenty of water.     May continue with allergy meds, Coricidan for sinusitis  Fill and begin taking Doxycycline sent in by PCP    May f/u with chiropractor if back pain persists.  Has cyclobenzaprine at home to take as needed.     See PCP or RTC if symptoms persist/worsen  See PCP for routine f/u visit and management of chronic medical conditions      This document has been electronically signed by RON Stallworth on May 5, 2022 15:37 CDT,.    I spent 35 minutes caring for Guevara on this date of service. This time includes time spent by me in the following activities:preparing for the visit, reviewing tests, performing a medically appropriate examination and/or evaluation , counseling and educating the patient/family/caregiver, ordering medications, tests, or procedures and documenting information in the medical record

## 2022-05-05 NOTE — PATIENT INSTRUCTIONS
Sinusitis, Adult  Sinusitis is inflammation of your sinuses. Sinuses are hollow spaces in the bones around your face. Your sinuses are located:  Around your eyes.  In the middle of your forehead.  Behind your nose.  In your cheekbones.  Mucus normally drains out of your sinuses. When your nasal tissues become inflamed or swollen, mucus can become trapped or blocked. This allows bacteria, viruses, and fungi to grow, which leads to infection. Most infections of the sinuses are caused by a virus.  Sinusitis can develop quickly. It can last for up to 4 weeks (acute) or for more than 12 weeks (chronic). Sinusitis often develops after a cold.  What are the causes?  This condition is caused by anything that creates swelling in the sinuses or stops mucus from draining. This includes:  Allergies.  Asthma.  Infection from bacteria or viruses.  Deformities or blockages in your nose or sinuses.  Abnormal growths in the nose (nasal polyps).  Pollutants, such as chemicals or irritants in the air.  Infection from fungi (rare).  What increases the risk?  You are more likely to develop this condition if you:  Have a weak body defense system (immune system).  Do a lot of swimming or diving.  Overuse nasal sprays.  Smoke.  What are the signs or symptoms?  The main symptoms of this condition are pain and a feeling of pressure around the affected sinuses. Other symptoms include:  Stuffy nose or congestion.  Thick drainage from your nose.  Swelling and warmth over the affected sinuses.  Headache.  Upper toothache.  A cough that may get worse at night.  Extra mucus that collects in the throat or the back of the nose (postnasal drip).  Decreased sense of smell and taste.  Fatigue.  A fever.  Sore throat.  Bad breath.  How is this diagnosed?  This condition is diagnosed based on:  Your symptoms.  Your medical history.  A physical exam.  Tests to find out if your condition is acute or chronic. This may include:  Checking your nose for nasal  polyps.  Viewing your sinuses using a device that has a light (endoscope).  Testing for allergies or bacteria.  Imaging tests, such as an MRI or CT scan.  In rare cases, a bone biopsy may be done to rule out more serious types of fungal sinus disease.  How is this treated?  Treatment for sinusitis depends on the cause and whether your condition is chronic or acute.  If caused by a virus, your symptoms should go away on their own within 10 days. You may be given medicines to relieve symptoms. They include:  Medicines that shrink swollen nasal passages (topical intranasal decongestants).  Medicines that treat allergies (antihistamines).  A spray that eases inflammation of the nostrils (topical intranasal corticosteroids).  Rinses that help get rid of thick mucus in your nose (nasal saline washes).  If caused by bacteria, your health care provider may recommend waiting to see if your symptoms improve. Most bacterial infections will get better without antibiotic medicine. You may be given antibiotics if you have:  A severe infection.  A weak immune system.  If caused by narrow nasal passages or nasal polyps, you may need to have surgery.  Follow these instructions at home:  Medicines  Take, use, or apply over-the-counter and prescription medicines only as told by your health care provider. These may include nasal sprays.  If you were prescribed an antibiotic medicine, take it as told by your health care provider. Do not stop taking the antibiotic even if you start to feel better.  Hydrate and humidify    Drink enough fluid to keep your urine pale yellow. Staying hydrated will help to thin your mucus.  Use a cool mist humidifier to keep the humidity level in your home above 50%.  Inhale steam for 10-15 minutes, 3-4 times a day, or as told by your health care provider. You can do this in the bathroom while a hot shower is running.  Limit your exposure to cool or dry air.    Rest  Rest as much as possible.  Sleep with your  head raised (elevated).  Make sure you get enough sleep each night.  General instructions    Apply a warm, moist washcloth to your face 3-4 times a day or as told by your health care provider. This will help with discomfort.  Wash your hands often with soap and water to reduce your exposure to germs. If soap and water are not available, use hand .  Do not smoke. Avoid being around people who are smoking (secondhand smoke).  Keep all follow-up visits as told by your health care provider. This is important.    Contact a health care provider if:  You have a fever.  Your symptoms get worse.  Your symptoms do not improve within 10 days.  Get help right away if:  You have a severe headache.  You have persistent vomiting.  You have severe pain or swelling around your face or eyes.  You have vision problems.  You develop confusion.  Your neck is stiff.  You have trouble breathing.  Summary  Sinusitis is soreness and inflammation of your sinuses. Sinuses are hollow spaces in the bones around your face.  This condition is caused by nasal tissues that become inflamed or swollen. The swelling traps or blocks the flow of mucus. This allows bacteria, viruses, and fungi to grow, which leads to infection.  If you were prescribed an antibiotic medicine, take it as told by your health care provider. Do not stop taking the antibiotic even if you start to feel better.  Keep all follow-up visits as told by your health care provider. This is important.  This information is not intended to replace advice given to you by your health care provider. Make sure you discuss any questions you have with your health care provider.  Document Revised: 05/20/2019 Document Reviewed: 05/20/2019  [employer] Patient Education © 2021 [employer]

## 2022-05-07 LAB — BACTERIA SPEC AEROBE CULT: NO GROWTH

## 2022-06-22 ENCOUNTER — OFFICE VISIT (OUTPATIENT)
Dept: FAMILY MEDICINE CLINIC | Facility: CLINIC | Age: 62
End: 2022-06-22

## 2022-06-22 VITALS
HEART RATE: 63 BPM | HEIGHT: 72 IN | SYSTOLIC BLOOD PRESSURE: 116 MMHG | TEMPERATURE: 97.3 F | WEIGHT: 202.9 LBS | OXYGEN SATURATION: 100 % | DIASTOLIC BLOOD PRESSURE: 68 MMHG | BODY MASS INDEX: 27.48 KG/M2

## 2022-06-22 DIAGNOSIS — M79.672 BILATERAL LEG AND FOOT PAIN: ICD-10-CM

## 2022-06-22 DIAGNOSIS — M79.604 RIGHT LEG PAIN: ICD-10-CM

## 2022-06-22 DIAGNOSIS — R12 HEART BURN: ICD-10-CM

## 2022-06-22 DIAGNOSIS — Z90.79 H/O PROSTATECTOMY: ICD-10-CM

## 2022-06-22 DIAGNOSIS — E78.5 HYPERLIPIDEMIA, UNSPECIFIED HYPERLIPIDEMIA TYPE: ICD-10-CM

## 2022-06-22 DIAGNOSIS — M79.2 NEUROPATHIC PAIN: ICD-10-CM

## 2022-06-22 DIAGNOSIS — R06.02 SHORTNESS OF BREATH: ICD-10-CM

## 2022-06-22 DIAGNOSIS — R12 HEARTBURN: ICD-10-CM

## 2022-06-22 DIAGNOSIS — Z85.46 H/O PROSTATE CANCER: ICD-10-CM

## 2022-06-22 DIAGNOSIS — J01.41 ACUTE RECURRENT PANSINUSITIS: ICD-10-CM

## 2022-06-22 DIAGNOSIS — N52.31 ERECTILE DYSFUNCTION AFTER RADICAL PROSTATECTOMY: ICD-10-CM

## 2022-06-22 DIAGNOSIS — J30.9 CHRONIC ALLERGIC RHINITIS: Chronic | ICD-10-CM

## 2022-06-22 DIAGNOSIS — M79.671 BILATERAL LEG AND FOOT PAIN: ICD-10-CM

## 2022-06-22 DIAGNOSIS — M79.604 BILATERAL LEG AND FOOT PAIN: ICD-10-CM

## 2022-06-22 DIAGNOSIS — I10 ESSENTIAL HYPERTENSION: ICD-10-CM

## 2022-06-22 DIAGNOSIS — M79.605 BILATERAL LEG AND FOOT PAIN: ICD-10-CM

## 2022-06-22 PROCEDURE — 99214 OFFICE O/P EST MOD 30 MIN: CPT | Performed by: FAMILY MEDICINE

## 2022-06-22 RX ORDER — OMEPRAZOLE 40 MG/1
40 CAPSULE, DELAYED RELEASE ORAL DAILY
Qty: 90 CAPSULE | Refills: 1 | Status: SHIPPED | OUTPATIENT
Start: 2022-06-22 | End: 2022-09-27 | Stop reason: SDUPTHER

## 2022-06-22 RX ORDER — IBUPROFEN 800 MG/1
TABLET ORAL
COMMUNITY
Start: 2022-06-21 | End: 2023-01-24 | Stop reason: SDUPTHER

## 2022-06-22 RX ORDER — AZELASTINE 1 MG/ML
2 SPRAY, METERED NASAL 2 TIMES DAILY
Qty: 30 ML | Refills: 6 | Status: SHIPPED | OUTPATIENT
Start: 2022-06-22 | End: 2023-01-24 | Stop reason: SDUPTHER

## 2022-06-22 RX ORDER — MONTELUKAST SODIUM 10 MG/1
10 TABLET ORAL NIGHTLY
Qty: 90 TABLET | Refills: 1 | Status: SHIPPED | OUTPATIENT
Start: 2022-06-22 | End: 2022-09-27 | Stop reason: SDUPTHER

## 2022-06-22 RX ORDER — SUCRALFATE 1 G/1
1 TABLET ORAL 4 TIMES DAILY PRN
Qty: 120 TABLET | Refills: 3 | Status: SHIPPED | OUTPATIENT
Start: 2022-06-22 | End: 2023-01-24 | Stop reason: SDUPTHER

## 2022-06-22 RX ORDER — CYCLOBENZAPRINE HCL 10 MG
10 TABLET ORAL NIGHTLY PRN
Qty: 90 TABLET | Refills: 2 | Status: SHIPPED | OUTPATIENT
Start: 2022-06-22 | End: 2023-01-24 | Stop reason: SDUPTHER

## 2022-06-22 RX ORDER — CETIRIZINE HYDROCHLORIDE 10 MG/1
10 TABLET ORAL DAILY
Qty: 90 TABLET | Refills: 1 | Status: SHIPPED | OUTPATIENT
Start: 2022-06-22 | End: 2022-09-27 | Stop reason: SDUPTHER

## 2022-06-22 RX ORDER — TADALAFIL 20 MG/1
20 TABLET ORAL DAILY PRN
Qty: 30 TABLET | Refills: 2 | Status: SHIPPED | OUTPATIENT
Start: 2022-06-22 | End: 2023-01-09

## 2022-06-22 RX ORDER — GABAPENTIN 300 MG/1
300 CAPSULE ORAL 3 TIMES DAILY
Qty: 90 CAPSULE | Refills: 3 | Status: SHIPPED | OUTPATIENT
Start: 2022-06-22 | End: 2022-09-27 | Stop reason: SDUPTHER

## 2022-06-22 RX ORDER — ALBUTEROL SULFATE 90 UG/1
2 AEROSOL, METERED RESPIRATORY (INHALATION) EVERY 4 HOURS PRN
Qty: 18 G | Refills: 1 | Status: SHIPPED | OUTPATIENT
Start: 2022-06-22 | End: 2022-09-27 | Stop reason: SDUPTHER

## 2022-06-22 RX ORDER — RAMIPRIL 10 MG/1
10 CAPSULE ORAL DAILY
Qty: 90 CAPSULE | Refills: 1 | Status: SHIPPED | OUTPATIENT
Start: 2022-06-22 | End: 2022-09-27 | Stop reason: SDUPTHER

## 2022-06-22 NOTE — PROGRESS NOTES
Chief Complaint  Leg Pain, Hypertension, Hyperlipidemia, Insomnia, and Foot Pain    Subjective          Review of Systems   Constitutional: Negative for activity change, appetite change and unexpected weight change.   HENT: Positive for hearing loss, sinus pain and tinnitus. Negative for dental problem, drooling, ear discharge, facial swelling, mouth sores, nosebleeds, postnasal drip, rhinorrhea, trouble swallowing and voice change.    Eyes: Positive for itching. Negative for photophobia, pain, discharge, redness and visual disturbance.        Wears glasses     Eye exams with Dr. Glaser   Respiratory: Negative for apnea, chest tightness and stridor.    Cardiovascular: Negative for palpitations and leg swelling.   Gastrointestinal: Positive for heartburn. Negative for abdominal distention, anal bleeding, blood in stool, constipation, diarrhea and rectal pain.        Rectal stenosis   Endocrine: Negative for cold intolerance, heat intolerance, polydipsia, polyphagia and polyuria.   Genitourinary: Positive for enuresis. Negative for decreased urine volume, difficulty urinating, dysuria, flank pain, frequency, genital sores, hematuria, penile discharge, penile pain, penile swelling, scrotal swelling, testicular pain and urgency.        Small R hernia felt in scrotum at times, told OK    Musculoskeletal: Positive for back pain. Negative for gait problem and neck stiffness.        R foot, Plantar faciitis.  R calf pain.     L5 DDD  Occasional back pain more on R    R shoulder pain   Skin: Negative for color change, pallor and wound.        Eczema   Allergic/Immunologic: Positive for environmental allergies. Negative for food allergies and immunocompromised state.   Neurological: Positive for tremors. Negative for dizziness, seizures, syncope, facial asymmetry, speech difficulty and light-headedness.   Hematological: Negative for adenopathy. Bruises/bleeds easily.   Psychiatric/Behavioral: Positive for sleep disturbance.  Negative for agitation, behavioral problems, confusion, decreased concentration, dysphoric mood, hallucinations, self-injury and suicidal ideas. The patient has insomnia. The patient is not nervous/anxious and is not hyperactive.         Neurotin helps with pain and sleep,      Sleeps 5 hrs with med.        Guevara Meza presents to [facility] [place]  Leg Pain   There was no injury mechanism. The pain is present in the right leg and left leg. The pain is at a severity of 6/10. The pain is moderate. Associated symptoms comments: Cramps improving. He has tried rest (Neurontin) for the symptoms. The treatment provided mild relief.   Hypertension  This is a chronic problem. The current episode started more than 1 year ago. The problem is controlled. Pertinent negatives include no palpitations. Agents associated with hypertension include decongestants and NSAIDs. Risk factors for coronary artery disease include male gender and dyslipidemia. Past treatments include ACE inhibitors. Current antihypertension treatment includes ACE inhibitors. The current treatment provides significant improvement.   Hyperlipidemia  This is a chronic problem. The current episode started more than 1 year ago. Recent lipid tests were reviewed and are variable. Associated symptoms include leg pain. The current treatment provides moderate improvement of lipids.   Insomnia  This is a chronic problem. The current episode started more than 1 year ago. The problem occurs daily. The problem has been gradually improving.   Foot Pain  This is a chronic problem. The current episode started 1 to 4 weeks ago. The problem occurs daily. The problem has been waxing and waning. The symptoms are aggravated by standing. He has tried acetaminophen for the symptoms. The treatment provided moderate relief.   Allergies  This is a chronic problem. The current episode started more than 1 year ago. The problem has  been waxing and waning. The symptoms are aggravated by coughing. Treatments tried: Antihistamines. The treatment provided mild relief.   Male  Problem  The patient's pertinent negatives include no penile discharge, penile pain, scrotal swelling or testicular pain. This is a chronic problem. The current episode started more than 1 year ago. The problem occurs daily. The problem has been waxing and waning. Pertinent negatives include no constipation, diarrhea, dysuria, flank pain, frequency or urgency. Exacerbated by: S/P Prostatectomy for cancer. The treatment provided mild relief. His past medical history is significant for erectile dysfunction.   Pain  This is a chronic problem. The current episode started more than 1 year ago. The problem occurs daily. The problem has been waxing and waning. Associated symptoms comments: Neuropathic foot and leg pain. The symptoms are aggravated by standing and walking. Treatments tried: shoe inserts. The treatment provided significant relief.   Erectile Dysfunction  This is a chronic problem. The current episode started more than 1 year ago. The problem has been waxing and waning since onset. He reports no anxiety. Irritative symptoms do not include frequency or urgency. Pertinent negatives include no dysuria or hematuria. Past treatments include tadalafil. The treatment provided no relief. He has been using treatment for 1 to 2 years. He has had abnormal vision and back pain caused by medications.   Heartburn  He complains of heartburn. This is a recurrent problem. The current episode started 1 to 4 weeks ago. The problem has been gradually worsening. The heartburn is of moderate intensity. The symptoms are aggravated by certain foods. He has tried a PPI and a histamine-2 antagonist for the symptoms. The treatment provided mild relief.       Objective   Vital Signs:   /68 (BP Location: Right arm, Patient Position: Sitting, Cuff Size: Adult)   Pulse 63   Temp 97.3 °F  "(36.3 °C) (Temporal)   Ht 182.9 cm (72\")   Wt 92 kg (202 lb 14.4 oz)   SpO2 100%   BMI 27.52 kg/m²     Physical Exam  Vitals and nursing note reviewed.   Constitutional:       General: He is not in acute distress.     Appearance: Normal appearance. He is well-developed. He is not diaphoretic.   HENT:      Head: Normocephalic.      Comments: Hearing aides     Right Ear: Tympanic membrane, ear canal and external ear normal. There is no impacted cerumen.      Left Ear: Tympanic membrane, ear canal and external ear normal. There is no impacted cerumen.      Nose: Congestion and rhinorrhea present.      Comments: Tan post nasal drainage.      Mouth/Throat:      Mouth: Mucous membranes are moist.   Eyes:      General: No scleral icterus.        Right eye: No discharge.         Left eye: No discharge.      Conjunctiva/sclera: Conjunctivae normal.      Pupils: Pupils are equal, round, and reactive to light.   Neck:      Thyroid: No thyromegaly.      Vascular: No JVD.      Trachea: No tracheal deviation.   Cardiovascular:      Rate and Rhythm: Normal rate.      Heart sounds: Normal heart sounds. No murmur heard.    No friction rub. No gallop.   Pulmonary:      Effort: Pulmonary effort is normal. No respiratory distress.      Breath sounds: Normal breath sounds. No stridor. No wheezing or rales.   Chest:      Chest wall: No tenderness.   Abdominal:      General: Bowel sounds are normal. There is no distension.      Palpations: There is no mass.      Tenderness: There is no abdominal tenderness. There is no right CVA tenderness, left CVA tenderness, guarding or rebound.      Hernia: No hernia is present.   Musculoskeletal:         General: No tenderness or deformity.   Lymphadenopathy:      Cervical: No cervical adenopathy.   Skin:     General: Skin is warm and dry.      Capillary Refill: Capillary refill takes 2 to 3 seconds.      Coloration: Skin is not pale.      Findings: No erythema or rash.   Neurological:      " General: No focal deficit present.      Mental Status: He is alert and oriented to person, place, and time.      Cranial Nerves: No cranial nerve deficit.      Motor: No weakness or abnormal muscle tone.      Coordination: Coordination normal.      Gait: Gait normal.      Deep Tendon Reflexes: Reflexes are normal and symmetric. Reflexes normal.   Psychiatric:         Mood and Affect: Mood normal.         Behavior: Behavior normal.         Thought Content: Thought content normal.         Judgment: Judgment normal.        Result Review :                 Assessment and Plan    Diagnoses and all orders for this visit:    1. Right leg pain  -     gabapentin (NEURONTIN) 300 MG capsule; Take 1 capsule by mouth 3 (Three) Times a Day.  Dispense: 90 capsule; Refill: 3    2. Bilateral leg and foot pain  -     gabapentin (NEURONTIN) 300 MG capsule; Take 1 capsule by mouth 3 (Three) Times a Day.  Dispense: 90 capsule; Refill: 3  -     cyclobenzaprine (FLEXERIL) 10 MG tablet; Take 1 tablet by mouth At Night As Needed for Muscle Spasms.  Dispense: 90 tablet; Refill: 2    3. Neuropathic pain  -     gabapentin (NEURONTIN) 300 MG capsule; Take 1 capsule by mouth 3 (Three) Times a Day.  Dispense: 90 capsule; Refill: 3    4. Shortness of breath  -     albuterol sulfate  (90 Base) MCG/ACT inhaler; Inhale 2 puffs Every 4 (Four) Hours As Needed for Wheezing or Shortness of Air.  Dispense: 18 g; Refill: 1    5. Chronic allergic rhinitis  -     azelastine (ASTELIN) 0.1 % nasal spray; 2 sprays into the nostril(s) as directed by provider 2 (Two) Times a Day. Use in each nostril as directed  Dispense: 30 mL; Refill: 6  -     montelukast (Singulair) 10 MG tablet; Take 1 tablet by mouth Every Night.  Dispense: 90 tablet; Refill: 1    6. Acute recurrent pansinusitis  -     cetirizine (zyrTEC) 10 MG tablet; Take 1 tablet by mouth Daily.  Dispense: 90 tablet; Refill: 1    7. Heart burn  -     omeprazole (priLOSEC) 40 MG capsule; Take 1  capsule by mouth Daily.  Dispense: 90 capsule; Refill: 1    8. Essential hypertension  -     ramipril (ALTACE) 10 MG capsule; Take 1 capsule by mouth Daily.  Dispense: 90 capsule; Refill: 1    9. Heartburn  -     sucralfate (Carafate) 1 g tablet; Take 1 tablet by mouth 4 (Four) Times a Day As Needed (Stomach pain). Stomach pain  Dispense: 120 tablet; Refill: 3    10. Erectile dysfunction after radical prostatectomy  -     tadalafil (Cialis) 20 MG tablet; Take 1 tablet by mouth Daily As Needed for Erectile Dysfunction.  Dispense: 30 tablet; Refill: 2    11. Hyperlipidemia, unspecified hyperlipidemia type    12. H/O prostatectomy    13. H/O prostate cancer        Follow Up   Return in about 4 months (around 10/22/2022).  Patient was given instructions and counseling regarding his condition or for health maintenance advice. Please see specific information pulled into the AVS if appropriate.         This document has been electronically signed by Rd Pabon MD on June 22, 2022 12:59 CDT

## 2022-08-11 RX ORDER — AZITHROMYCIN 250 MG/1
TABLET, FILM COATED ORAL
Qty: 6 TABLET | Refills: 0 | Status: SHIPPED | OUTPATIENT
Start: 2022-08-11 | End: 2022-09-27

## 2022-08-26 ENCOUNTER — OFFICE VISIT (OUTPATIENT)
Dept: FAMILY MEDICINE CLINIC | Facility: CLINIC | Age: 62
End: 2022-08-26

## 2022-08-26 VITALS
WEIGHT: 209 LBS | TEMPERATURE: 97.9 F | DIASTOLIC BLOOD PRESSURE: 88 MMHG | OXYGEN SATURATION: 97 % | HEIGHT: 72 IN | SYSTOLIC BLOOD PRESSURE: 120 MMHG | BODY MASS INDEX: 28.31 KG/M2 | HEART RATE: 65 BPM

## 2022-08-26 DIAGNOSIS — J01.90 ACUTE SINUSITIS, RECURRENCE NOT SPECIFIED, UNSPECIFIED LOCATION: Primary | ICD-10-CM

## 2022-08-26 DIAGNOSIS — S29.011A MUSCLE STRAIN OF CHEST WALL, INITIAL ENCOUNTER: ICD-10-CM

## 2022-08-26 DIAGNOSIS — R07.89 BURNING IN THE CHEST: ICD-10-CM

## 2022-08-26 PROCEDURE — 96372 THER/PROPH/DIAG INJ SC/IM: CPT | Performed by: NURSE PRACTITIONER

## 2022-08-26 PROCEDURE — 99213 OFFICE O/P EST LOW 20 MIN: CPT | Performed by: NURSE PRACTITIONER

## 2022-08-26 RX ORDER — KETOCONAZOLE 20 MG/ML
SHAMPOO TOPICAL
COMMUNITY
Start: 2022-08-08

## 2022-08-26 RX ORDER — TRIAMCINOLONE ACETONIDE 40 MG/ML
60 INJECTION, SUSPENSION INTRA-ARTICULAR; INTRAMUSCULAR ONCE
Status: COMPLETED | OUTPATIENT
Start: 2022-08-26 | End: 2022-08-26

## 2022-08-26 RX ORDER — CLOBETASOL PROPIONATE 0.46 MG/ML
SOLUTION TOPICAL
COMMUNITY
End: 2022-09-27

## 2022-08-26 RX ADMIN — TRIAMCINOLONE ACETONIDE 60 MG: 40 INJECTION, SUSPENSION INTRA-ARTICULAR; INTRAMUSCULAR at 11:35

## 2022-08-26 NOTE — PROGRESS NOTES
"Chief Complaint  Illness (Sinus drainage, nasal, chest burning started 8/10)    Subjective          Guevara Meza presents to [facility]    PCP: Dr. Pabon     CC: \"sinus drainage, nasal discharge, chest burning\"    Sinus symptoms x 2 weeks.  PCP called in Rx on 8- for Zithromax.  Patient unaware, so just picked up this week.  Reports it does seem to be helping with sinus symptoms.  Originally started after mowing the yard.  Had headaches, sinus pressure, purulent nasal discharge.  Has one more day of Zithromax remaining  Also doing sinus rinses daily and taking zyrtec.  Hx of chronic allergic rhinitis and recurrent sinus infections.  No fever, body aches reported.  Feels no different than symptoms in the past.  Declines Covid testing.    Also c/o burning in right upper chest area for about a week or two.  Started after he had been cleaning out a ceiling in an old trailer.  Was not wearing a mask.  Concerned he may have breathed in something.  Denies cough or dyspnea.  Has not noted in wheezing.  Reports he was standing below ceiling and having to reach up to clean it out, right hand dominant.  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  Denies jaw pain, nausea, diaphoresis, left arm pain.        Review of Systems   Constitutional: Negative.    HENT: Positive for congestion, postnasal drip, rhinorrhea (blood tinged initially, improving), sinus pressure ( improving) and sinus pain (improving). Negative for ear discharge, ear pain, sneezing, sore throat and trouble swallowing.    Eyes: Negative.    Respiratory: Negative for cough, shortness of breath and wheezing. Chest tightness:  burning in right side of chest.    Cardiovascular: Negative for palpitations and leg swelling. Chest pain:  burning in right side of chest.   Genitourinary: Negative.    Musculoskeletal: Negative.    Skin: " "Negative.    Neurological: Positive for headaches (last week, improved). Negative for dizziness.        Objective   Vital Signs:   /88 (BP Location: Left arm, Patient Position: Sitting)   Pulse 65   Temp 97.9 °F (36.6 °C)   Ht 182.9 cm (72\")   Wt 94.8 kg (209 lb)   SpO2 97%   BMI 28.35 kg/m²       Physical Exam  Vitals and nursing note reviewed.   Constitutional:       General: He is not in acute distress.     Appearance: Normal appearance. He is not ill-appearing.   HENT:      Head: Normocephalic and atraumatic.      Right Ear: Tympanic membrane and ear canal normal. Decreased hearing (+hearing aid) noted. No middle ear effusion. Tympanic membrane is not erythematous.      Left Ear: Tympanic membrane and ear canal normal. Decreased hearing ( +hearing aid) noted.  No middle ear effusion. Tympanic membrane is not erythematous.      Nose: Congestion present.      Comments: Generalized sinus pressure remains, but no localized pain currently     Mouth/Throat:      Mouth: Mucous membranes are moist.      Pharynx: Posterior oropharyngeal erythema ( mild injection with PND) present. No pharyngeal swelling or oropharyngeal exudate.   Eyes:      General:         Right eye: No discharge.         Left eye: No discharge.      Conjunctiva/sclera: Conjunctivae normal.   Cardiovascular:      Rate and Rhythm: Normal rate and regular rhythm.   Pulmonary:      Effort: Pulmonary effort is normal. No respiratory distress.      Breath sounds: Normal breath sounds. No wheezing, rhonchi or rales.      Comments: +loose cough  Chest:      Chest wall: Tenderness present.       Musculoskeletal:      Cervical back: Neck supple. No tenderness.   Lymphadenopathy:      Cervical: No cervical adenopathy.   Skin:     General: Skin is warm and dry.   Neurological:      General: No focal deficit present.      Mental Status: He is alert and oriented to person, place, and time.   Psychiatric:         Mood and Affect: Mood normal.         " Thought Content: Thought content normal.          Result Review :     Common labs    Common Labsle 10/20/21 10/20/21 10/20/21 10/20/21    0817 0817 0817 0817   Glucose  87     BUN  10     Creatinine  0.99     eGFR Non African Am  76     Sodium  139     Potassium  4.0     Chloride  103     Calcium  9.1     Albumin  4.40     Total Bilirubin  0.5     Alkaline Phosphatase  70     AST (SGOT)  15     ALT (SGPT)  14     WBC 5.40      Hemoglobin 14.8      Hematocrit 41.7      Platelets 231      Total Cholesterol   192    Triglycerides   57    HDL Cholesterol   56    LDL Cholesterol    125 (A)    PSA    <0.014   (A) Abnormal value                      Assessment and Plan    Diagnoses and all orders for this visit:    1. Acute sinusitis, recurrence not specified, unspecified location (Primary)  -     triamcinolone acetonide (KENALOG-40) injection 60 mg    2. Burning in the chest  -     triamcinolone acetonide (KENALOG-40) injection 60 mg  -     XR Chest PA & Lateral    3. Muscle strain of chest wall, initial encounter      Declines EKG at this time.   CXR ordered--wishes to wait and see how he does, will return next week if no improvement.   Suspect muscle strain.  Moist heat or ice as needed.  Tylenol PRN. Avoid any heavy lifting/pulling/straining.     Finish Zithromax  Kenalog 60 mg IM x 1 in office    See PCP or RTC if symptoms persist/worsen  See PCP for routine f/u visit and management of chronic medical conditions      This document has been electronically signed by RON Stallworth on August 26, 2022 11:38 CDT,.

## 2022-08-26 NOTE — PATIENT INSTRUCTIONS
Sinusitis, Adult  Sinusitis is inflammation of your sinuses. Sinuses are hollow spaces in the bones around your face. Your sinuses are located:  Around your eyes.  In the middle of your forehead.  Behind your nose.  In your cheekbones.  Mucus normally drains out of your sinuses. When your nasal tissues become inflamed or swollen, mucus can become trapped or blocked. This allows bacteria, viruses, and fungi to grow, which leads to infection. Most infections of the sinuses are caused by a virus.  Sinusitis can develop quickly. It can last for up to 4 weeks (acute) or for more than 12 weeks (chronic). Sinusitis often develops after a cold.  What are the causes?  This condition is caused by anything that creates swelling in the sinuses or stops mucus from draining. This includes:  Allergies.  Asthma.  Infection from bacteria or viruses.  Deformities or blockages in your nose or sinuses.  Abnormal growths in the nose (nasal polyps).  Pollutants, such as chemicals or irritants in the air.  Infection from fungi (rare).  What increases the risk?  You are more likely to develop this condition if you:  Have a weak body defense system (immune system).  Do a lot of swimming or diving.  Overuse nasal sprays.  Smoke.  What are the signs or symptoms?  The main symptoms of this condition are pain and a feeling of pressure around the affected sinuses. Other symptoms include:  Stuffy nose or congestion.  Thick drainage from your nose.  Swelling and warmth over the affected sinuses.  Headache.  Upper toothache.  A cough that may get worse at night.  Extra mucus that collects in the throat or the back of the nose (postnasal drip).  Decreased sense of smell and taste.  Fatigue.  A fever.  Sore throat.  Bad breath.  How is this diagnosed?  This condition is diagnosed based on:  Your symptoms.  Your medical history.  A physical exam.  Tests to find out if your condition is acute or chronic. This may include:  Checking your nose for nasal  polyps.  Viewing your sinuses using a device that has a light (endoscope).  Testing for allergies or bacteria.  Imaging tests, such as an MRI or CT scan.  In rare cases, a bone biopsy may be done to rule out more serious types of fungal sinus disease.  How is this treated?  Treatment for sinusitis depends on the cause and whether your condition is chronic or acute.  If caused by a virus, your symptoms should go away on their own within 10 days. You may be given medicines to relieve symptoms. They include:  Medicines that shrink swollen nasal passages (topical intranasal decongestants).  Medicines that treat allergies (antihistamines).  A spray that eases inflammation of the nostrils (topical intranasal corticosteroids).  Rinses that help get rid of thick mucus in your nose (nasal saline washes).  If caused by bacteria, your health care provider may recommend waiting to see if your symptoms improve. Most bacterial infections will get better without antibiotic medicine. You may be given antibiotics if you have:  A severe infection.  A weak immune system.  If caused by narrow nasal passages or nasal polyps, you may need to have surgery.  Follow these instructions at home:  Medicines  Take, use, or apply over-the-counter and prescription medicines only as told by your health care provider. These may include nasal sprays.  If you were prescribed an antibiotic medicine, take it as told by your health care provider. Do not stop taking the antibiotic even if you start to feel better.  Hydrate and humidify    Drink enough fluid to keep your urine pale yellow. Staying hydrated will help to thin your mucus.  Use a cool mist humidifier to keep the humidity level in your home above 50%.  Inhale steam for 10-15 minutes, 3-4 times a day, or as told by your health care provider. You can do this in the bathroom while a hot shower is running.  Limit your exposure to cool or dry air.    Rest  Rest as much as possible.  Sleep with your  head raised (elevated).  Make sure you get enough sleep each night.  General instructions    Apply a warm, moist washcloth to your face 3-4 times a day or as told by your health care provider. This will help with discomfort.  Wash your hands often with soap and water to reduce your exposure to germs. If soap and water are not available, use hand .  Do not smoke. Avoid being around people who are smoking (secondhand smoke).  Keep all follow-up visits as told by your health care provider. This is important.    Contact a health care provider if:  You have a fever.  Your symptoms get worse.  Your symptoms do not improve within 10 days.  Get help right away if:  You have a severe headache.  You have persistent vomiting.  You have severe pain or swelling around your face or eyes.  You have vision problems.  You develop confusion.  Your neck is stiff.  You have trouble breathing.  Summary  Sinusitis is soreness and inflammation of your sinuses. Sinuses are hollow spaces in the bones around your face.  This condition is caused by nasal tissues that become inflamed or swollen. The swelling traps or blocks the flow of mucus. This allows bacteria, viruses, and fungi to grow, which leads to infection.  If you were prescribed an antibiotic medicine, take it as told by your health care provider. Do not stop taking the antibiotic even if you start to feel better.  Keep all follow-up visits as told by your health care provider. This is important.  This information is not intended to replace advice given to you by your health care provider. Make sure you discuss any questions you have with your health care provider.  Document Revised: 05/20/2019 Document Reviewed: 05/20/2019  [employer] Patient Education © 2021 [employer]

## 2022-09-13 ENCOUNTER — TELEPHONE (OUTPATIENT)
Dept: FAMILY MEDICINE CLINIC | Facility: CLINIC | Age: 62
End: 2022-09-13

## 2022-09-13 NOTE — TELEPHONE ENCOUNTER
Called patient and reminded him of xray that William Franco ordered. He said he was still feeling the same so he will come in tomorrow to get it done.

## 2022-09-27 ENCOUNTER — OFFICE VISIT (OUTPATIENT)
Dept: FAMILY MEDICINE CLINIC | Facility: CLINIC | Age: 62
End: 2022-09-27

## 2022-09-27 VITALS
BODY MASS INDEX: 27.74 KG/M2 | WEIGHT: 204.8 LBS | HEIGHT: 72 IN | HEART RATE: 73 BPM | TEMPERATURE: 97.5 F | OXYGEN SATURATION: 97 % | DIASTOLIC BLOOD PRESSURE: 82 MMHG | SYSTOLIC BLOOD PRESSURE: 134 MMHG

## 2022-09-27 DIAGNOSIS — M79.604 BILATERAL LEG AND FOOT PAIN: ICD-10-CM

## 2022-09-27 DIAGNOSIS — R21 RASH: ICD-10-CM

## 2022-09-27 DIAGNOSIS — M79.672 BILATERAL LEG AND FOOT PAIN: ICD-10-CM

## 2022-09-27 DIAGNOSIS — R06.02 SHORTNESS OF BREATH: ICD-10-CM

## 2022-09-27 DIAGNOSIS — J01.41 ACUTE RECURRENT PANSINUSITIS: ICD-10-CM

## 2022-09-27 DIAGNOSIS — Z11.59 ENCOUNTER FOR HEPATITIS C SCREENING TEST FOR LOW RISK PATIENT: ICD-10-CM

## 2022-09-27 DIAGNOSIS — M79.671 BILATERAL LEG AND FOOT PAIN: ICD-10-CM

## 2022-09-27 DIAGNOSIS — I10 ESSENTIAL HYPERTENSION: ICD-10-CM

## 2022-09-27 DIAGNOSIS — E78.5 HYPERLIPIDEMIA, UNSPECIFIED HYPERLIPIDEMIA TYPE: ICD-10-CM

## 2022-09-27 DIAGNOSIS — J30.9 CHRONIC ALLERGIC RHINITIS: Chronic | ICD-10-CM

## 2022-09-27 DIAGNOSIS — R69 ILLNESS: ICD-10-CM

## 2022-09-27 DIAGNOSIS — M79.604 RIGHT LEG PAIN: ICD-10-CM

## 2022-09-27 DIAGNOSIS — M79.605 BILATERAL LEG AND FOOT PAIN: ICD-10-CM

## 2022-09-27 DIAGNOSIS — R12 HEART BURN: ICD-10-CM

## 2022-09-27 DIAGNOSIS — M79.2 NEUROPATHIC PAIN: ICD-10-CM

## 2022-09-27 PROCEDURE — 99214 OFFICE O/P EST MOD 30 MIN: CPT | Performed by: FAMILY MEDICINE

## 2022-09-27 RX ORDER — ALBUTEROL SULFATE 90 UG/1
2 AEROSOL, METERED RESPIRATORY (INHALATION) EVERY 4 HOURS PRN
Qty: 18 G | Refills: 1 | Status: SHIPPED | OUTPATIENT
Start: 2022-09-27 | End: 2023-01-24 | Stop reason: SDUPTHER

## 2022-09-27 RX ORDER — OMEPRAZOLE 40 MG/1
40 CAPSULE, DELAYED RELEASE ORAL DAILY
Qty: 90 CAPSULE | Refills: 0 | Status: SHIPPED | OUTPATIENT
Start: 2022-09-27 | End: 2023-01-24 | Stop reason: SDUPTHER

## 2022-09-27 RX ORDER — RAMIPRIL 10 MG/1
10 CAPSULE ORAL DAILY
Qty: 90 CAPSULE | Refills: 0 | Status: SHIPPED | OUTPATIENT
Start: 2022-09-27

## 2022-09-27 RX ORDER — CETIRIZINE HYDROCHLORIDE 10 MG/1
10 TABLET ORAL DAILY
Qty: 90 TABLET | Refills: 0 | Status: SHIPPED | OUTPATIENT
Start: 2022-09-27 | End: 2023-01-24 | Stop reason: SDUPTHER

## 2022-09-27 RX ORDER — CLOBETASOL PROPIONATE 0.46 MG/ML
SOLUTION TOPICAL
Qty: 25 ML | Refills: 0
Start: 2022-09-27

## 2022-09-27 RX ORDER — MONTELUKAST SODIUM 10 MG/1
10 TABLET ORAL NIGHTLY
Qty: 90 TABLET | Refills: 0 | Status: SHIPPED | OUTPATIENT
Start: 2022-09-27 | End: 2023-01-24 | Stop reason: SDUPTHER

## 2022-09-27 RX ORDER — GABAPENTIN 300 MG/1
300 CAPSULE ORAL 3 TIMES DAILY
Qty: 90 CAPSULE | Refills: 3 | Status: SHIPPED | OUTPATIENT
Start: 2022-09-27 | End: 2023-01-24 | Stop reason: SDUPTHER

## 2022-09-27 NOTE — PROGRESS NOTES
Chief Complaint  Hypertension, Hyperlipidemia, Leg Pain, Insomnia, and Arm Pain (Left /)    Subjective          Review of Systems   Constitutional: Negative for activity change, appetite change and unexpected weight change.   HENT: Positive for hearing loss, sinus pressure and tinnitus. Negative for dental problem, drooling, ear discharge, facial swelling, mouth sores, nosebleeds, postnasal drip, rhinorrhea, trouble swallowing and voice change.    Eyes: Positive for itching. Negative for photophobia, pain, discharge, redness and visual disturbance.        Wears glasses     Eye exams with Dr. Glaser   Respiratory: Negative for apnea, chest tightness and stridor.    Cardiovascular: Negative for palpitations and leg swelling.        Past Neg Cardiac work up with Dr. Goode.     Recent chest wall pain after lifting, improved.   Gastrointestinal: Positive for heartburn. Negative for abdominal distention, anal bleeding, blood in stool and rectal pain.        Rectal stenosis    L lower abd tenderness, abrasion hit by 2x4 using as lever moving a 200 lb tire.    Endocrine: Negative for cold intolerance, heat intolerance, polydipsia, polyphagia and polyuria.   Genitourinary: Positive for enuresis. Negative for decreased urine volume, difficulty urinating, genital sores and penile swelling.        Small R hernia felt in scrotum at times, told OK    Musculoskeletal: Positive for back pain. Negative for gait problem and neck stiffness.        R foot, Plantar faciitis.  R calf pain.     L5 DDD  Occasional back pain more on R    R shoulder pain   Skin: Negative for color change, pallor and wound.        Eczema   Allergic/Immunologic: Positive for environmental allergies. Negative for food allergies and immunocompromised state.   Neurological: Positive for tremors. Negative for dizziness, seizures, syncope, facial asymmetry, speech difficulty and light-headedness.   Hematological: Negative for adenopathy. Bruises/bleeds easily.    Psychiatric/Behavioral: Positive for sleep disturbance. Negative for agitation, behavioral problems, confusion, decreased concentration, dysphoric mood, hallucinations, self-injury and suicidal ideas. The patient has insomnia. The patient is not hyperactive.         Neurotin helps with pain and sleep,      Sleeps 5 hrs with med.        Guevara Meza presents to [facility] [place]  Hypertension  This is a chronic problem. The current episode started more than 1 year ago. The problem is controlled. Pertinent negatives include no palpitations. Agents associated with hypertension include decongestants and NSAIDs. Risk factors for coronary artery disease include male gender and dyslipidemia. Past treatments include ACE inhibitors. Current antihypertension treatment includes ACE inhibitors. The current treatment provides significant improvement.   Hyperlipidemia  This is a chronic problem. The current episode started more than 1 year ago. Recent lipid tests were reviewed and are variable. Associated symptoms include leg pain. The current treatment provides moderate improvement of lipids.   Leg Pain   There was no injury mechanism. The pain is present in the right leg and left leg. The pain is at a severity of 6/10. The pain is moderate. Associated symptoms comments: Cramps improving. He has tried rest (Neurontin) for the symptoms. The treatment provided mild relief.   Insomnia  This is a chronic problem. The current episode started more than 1 year ago. The problem occurs daily. The problem has been gradually improving. Treatments tried: OTC sleep aides. Flexeril helps. The treatment provided moderate relief.   Arm Pain   The incident occurred more than 1 week ago. The incident occurred at home. The injury mechanism was repetitive motion. The pain radiates to the left arm. The pain is at a severity of 4/10. The pain is moderate. The pain has been improving since the  "incident.   Allergies  This is a chronic problem. The current episode started more than 1 year ago. The problem has been waxing and waning. The symptoms are aggravated by coughing. Treatments tried: Antihistamines. The treatment provided mild relief.   Pain  This is a chronic problem. The current episode started more than 1 year ago. The problem occurs daily. The problem has been waxing and waning. Associated symptoms comments: Neuropathic foot and leg pain. The symptoms are aggravated by standing and walking. Treatments tried: shoe inserts. The treatment provided significant relief.   Heartburn  He complains of heartburn. This is a recurrent problem. The current episode started 1 to 4 weeks ago. The problem has been gradually worsening. The heartburn is of moderate intensity. The symptoms are aggravated by certain foods. He has tried a PPI and a histamine-2 antagonist for the symptoms. The treatment provided mild relief.       Objective   Vital Signs:   /82 (BP Location: Right arm, Patient Position: Sitting, Cuff Size: Adult)   Pulse 73   Temp 97.5 °F (36.4 °C) (Temporal)   Ht 182.9 cm (72\")   Wt 92.9 kg (204 lb 12.8 oz)   SpO2 97%   BMI 27.78 kg/m²     Physical Exam  Vitals and nursing note reviewed.   Constitutional:       General: He is not in acute distress.     Appearance: Normal appearance. He is well-developed. He is not diaphoretic.   HENT:      Head: Normocephalic.      Comments: Hearing aides     Right Ear: Tympanic membrane, ear canal and external ear normal. There is no impacted cerumen.      Left Ear: Tympanic membrane, ear canal and external ear normal. There is no impacted cerumen.      Nose: No congestion or rhinorrhea.      Comments: Tan post nasal drainage.      Mouth/Throat:      Mouth: Mucous membranes are moist.   Eyes:      General: No scleral icterus.        Right eye: No discharge.         Left eye: No discharge.      Conjunctiva/sclera: Conjunctivae normal.      Pupils: Pupils " are equal, round, and reactive to light.   Neck:      Thyroid: No thyromegaly.      Vascular: No JVD.      Trachea: No tracheal deviation.   Cardiovascular:      Rate and Rhythm: Normal rate.      Heart sounds: Normal heart sounds. No murmur heard.    No friction rub. No gallop.   Pulmonary:      Effort: Pulmonary effort is normal. No respiratory distress.      Breath sounds: Normal breath sounds. No stridor. No wheezing or rales.   Chest:      Chest wall: No tenderness.   Abdominal:      General: Bowel sounds are normal. There is no distension.      Palpations: There is no mass.      Tenderness: There is no abdominal tenderness. There is no right CVA tenderness, left CVA tenderness, guarding or rebound.      Hernia: No hernia is present.   Musculoskeletal:         General: No tenderness or deformity.   Lymphadenopathy:      Cervical: No cervical adenopathy.   Skin:     General: Skin is warm and dry.      Capillary Refill: Capillary refill takes 2 to 3 seconds.      Coloration: Skin is not pale.      Findings: No erythema or rash.   Neurological:      General: No focal deficit present.      Mental Status: He is alert and oriented to person, place, and time.      Cranial Nerves: No cranial nerve deficit.      Motor: No weakness or abnormal muscle tone.      Coordination: Coordination normal.      Gait: Gait normal.      Deep Tendon Reflexes: Reflexes are normal and symmetric. Reflexes normal.   Psychiatric:         Mood and Affect: Mood normal.         Behavior: Behavior normal.         Thought Content: Thought content normal.         Judgment: Judgment normal.        Result Review :                 Assessment and Plan    Diagnoses and all orders for this visit:    1. Right leg pain  -     gabapentin (NEURONTIN) 300 MG capsule; Take 1 capsule by mouth 3 (Three) Times a Day.  Dispense: 90 capsule; Refill: 3    2. Bilateral leg and foot pain  -     gabapentin (NEURONTIN) 300 MG capsule; Take 1 capsule by mouth 3  (Three) Times a Day.  Dispense: 90 capsule; Refill: 3    3. Neuropathic pain  -     gabapentin (NEURONTIN) 300 MG capsule; Take 1 capsule by mouth 3 (Three) Times a Day.  Dispense: 90 capsule; Refill: 3    4. Hyperlipidemia, unspecified hyperlipidemia type  -     CBC & Differential; Future  -     Comprehensive metabolic panel; Future  -     TSH; Future  -     Urinalysis With Culture If Indicated -; Future    5. Essential hypertension  -     CBC & Differential; Future  -     Comprehensive metabolic panel; Future  -     TSH; Future  -     Urinalysis With Culture If Indicated -; Future  -     ramipril (ALTACE) 10 MG capsule; Take 1 capsule by mouth Daily.  Dispense: 90 capsule; Refill: 0    6. Encounter for hepatitis C screening test for low risk patient  -     Hepatitis C antibody; Future    7. Illness  -     High Sensitivity CRP; Future    8. Shortness of breath  -     albuterol sulfate  (90 Base) MCG/ACT inhaler; Inhale 2 puffs Every 4 (Four) Hours As Needed for Wheezing or Shortness of Air.  Dispense: 18 g; Refill: 1    9. Acute recurrent pansinusitis  -     cetirizine (zyrTEC) 10 MG tablet; Take 1 tablet by mouth Daily.  Dispense: 90 tablet; Refill: 0    10. Chronic allergic rhinitis  -     montelukast (Singulair) 10 MG tablet; Take 1 tablet by mouth Every Night.  Dispense: 90 tablet; Refill: 0    11. Heart burn  -     omeprazole (priLOSEC) 40 MG capsule; Take 1 capsule by mouth Daily.  Dispense: 90 capsule; Refill: 0    12. Rash  -     clobetasol (TEMOVATE) 0.05 % external solution; Apply to the affected scalp area by topical route 2 times per day in the morning and evening as needed--gets from dermatology  Dispense: 25 mL; Refill: 0    Discussed exam,health problems, meds, indications, tx plan, rationale. Discussed safe lifting.     Follow Up   Return in about 4 months (around 1/27/2023).  Patient was given instructions and counseling regarding his condition or for health maintenance advice. Please see  specific information pulled into the AVS if appropriate.         This document has been electronically signed by Rd Pabon MD on September 27, 2022 15:40 CDT

## 2022-11-01 ENCOUNTER — TELEPHONE (OUTPATIENT)
Dept: FAMILY MEDICINE CLINIC | Facility: CLINIC | Age: 62
End: 2022-11-01

## 2022-11-01 DIAGNOSIS — Z12.5 SCREENING FOR PROSTATE CANCER: ICD-10-CM

## 2022-11-01 DIAGNOSIS — Z85.46 ENCOUNTER FOR FOLLOW-UP SURVEILLANCE OF PROSTATE CANCER: Primary | ICD-10-CM

## 2022-11-01 DIAGNOSIS — Z08 ENCOUNTER FOR FOLLOW-UP SURVEILLANCE OF PROSTATE CANCER: Primary | ICD-10-CM

## 2022-11-01 NOTE — TELEPHONE ENCOUNTER
Patient called and stated that he was to call so provider could add PSA to labs that have already been ordered

## 2022-11-02 ENCOUNTER — LAB (OUTPATIENT)
Dept: LAB | Facility: HOSPITAL | Age: 62
End: 2022-11-02

## 2022-11-02 DIAGNOSIS — I10 ESSENTIAL HYPERTENSION: ICD-10-CM

## 2022-11-02 DIAGNOSIS — Z85.46 ENCOUNTER FOR FOLLOW-UP SURVEILLANCE OF PROSTATE CANCER: ICD-10-CM

## 2022-11-02 DIAGNOSIS — E78.5 HYPERLIPIDEMIA, UNSPECIFIED HYPERLIPIDEMIA TYPE: ICD-10-CM

## 2022-11-02 DIAGNOSIS — Z08 ENCOUNTER FOR FOLLOW-UP SURVEILLANCE OF PROSTATE CANCER: ICD-10-CM

## 2022-11-02 DIAGNOSIS — R69 ILLNESS: ICD-10-CM

## 2022-11-02 DIAGNOSIS — Z12.5 SCREENING FOR PROSTATE CANCER: ICD-10-CM

## 2022-11-02 DIAGNOSIS — Z11.59 ENCOUNTER FOR HEPATITIS C SCREENING TEST FOR LOW RISK PATIENT: ICD-10-CM

## 2022-11-02 LAB
ALBUMIN SERPL-MCNC: 4.4 G/DL (ref 3.5–5.2)
ALBUMIN/GLOB SERPL: 2 G/DL
ALP SERPL-CCNC: 75 U/L (ref 39–117)
ALT SERPL W P-5'-P-CCNC: 14 U/L (ref 1–41)
ANION GAP SERPL CALCULATED.3IONS-SCNC: 9 MMOL/L (ref 5–15)
AST SERPL-CCNC: 17 U/L (ref 1–40)
BASOPHILS # BLD AUTO: 0.06 10*3/MM3 (ref 0–0.2)
BASOPHILS NFR BLD AUTO: 1.1 % (ref 0–1.5)
BILIRUB SERPL-MCNC: 0.4 MG/DL (ref 0–1.2)
BILIRUB UR QL STRIP: NEGATIVE
BUN SERPL-MCNC: 12 MG/DL (ref 8–23)
BUN/CREAT SERPL: 13.8 (ref 7–25)
CALCIUM SPEC-SCNC: 9.4 MG/DL (ref 8.6–10.5)
CHLORIDE SERPL-SCNC: 106 MMOL/L (ref 98–107)
CLARITY UR: CLEAR
CO2 SERPL-SCNC: 25 MMOL/L (ref 22–29)
COLOR UR: YELLOW
CREAT SERPL-MCNC: 0.87 MG/DL (ref 0.76–1.27)
CRP SERPL-MCNC: 0.21 MG/DL (ref 0.01–0.5)
DEPRECATED RDW RBC AUTO: 41.8 FL (ref 37–54)
EGFRCR SERPLBLD CKD-EPI 2021: 96.4 ML/MIN/1.73
EOSINOPHIL # BLD AUTO: 0.33 10*3/MM3 (ref 0–0.4)
EOSINOPHIL NFR BLD AUTO: 6 % (ref 0.3–6.2)
ERYTHROCYTE [DISTWIDTH] IN BLOOD BY AUTOMATED COUNT: 12.6 % (ref 12.3–15.4)
GLOBULIN UR ELPH-MCNC: 2.2 GM/DL
GLUCOSE SERPL-MCNC: 84 MG/DL (ref 65–99)
GLUCOSE UR STRIP-MCNC: NEGATIVE MG/DL
HCT VFR BLD AUTO: 40.6 % (ref 37.5–51)
HCV AB SER DONR QL: NORMAL
HGB BLD-MCNC: 13.9 G/DL (ref 13–17.7)
HGB UR QL STRIP.AUTO: NEGATIVE
IMM GRANULOCYTES # BLD AUTO: 0.01 10*3/MM3 (ref 0–0.05)
IMM GRANULOCYTES NFR BLD AUTO: 0.2 % (ref 0–0.5)
KETONES UR QL STRIP: NEGATIVE
LEUKOCYTE ESTERASE UR QL STRIP.AUTO: NEGATIVE
LYMPHOCYTES # BLD AUTO: 1.44 10*3/MM3 (ref 0.7–3.1)
LYMPHOCYTES NFR BLD AUTO: 26.1 % (ref 19.6–45.3)
MCH RBC QN AUTO: 31.1 PG (ref 26.6–33)
MCHC RBC AUTO-ENTMCNC: 34.2 G/DL (ref 31.5–35.7)
MCV RBC AUTO: 90.8 FL (ref 79–97)
MONOCYTES # BLD AUTO: 0.45 10*3/MM3 (ref 0.1–0.9)
MONOCYTES NFR BLD AUTO: 8.2 % (ref 5–12)
NEUTROPHILS NFR BLD AUTO: 3.22 10*3/MM3 (ref 1.7–7)
NEUTROPHILS NFR BLD AUTO: 58.4 % (ref 42.7–76)
NITRITE UR QL STRIP: NEGATIVE
NRBC BLD AUTO-RTO: 0 /100 WBC (ref 0–0.2)
PH UR STRIP.AUTO: 7 [PH] (ref 5–8)
PLATELET # BLD AUTO: 214 10*3/MM3 (ref 140–450)
PMV BLD AUTO: 10.5 FL (ref 6–12)
POTASSIUM SERPL-SCNC: 4.2 MMOL/L (ref 3.5–5.2)
PROT SERPL-MCNC: 6.6 G/DL (ref 6–8.5)
PROT UR QL STRIP: NEGATIVE
PSA SERPL-MCNC: <0.014 NG/ML (ref 0–4)
RBC # BLD AUTO: 4.47 10*6/MM3 (ref 4.14–5.8)
SODIUM SERPL-SCNC: 140 MMOL/L (ref 136–145)
SP GR UR STRIP: 1.01 (ref 1–1.03)
TSH SERPL DL<=0.05 MIU/L-ACNC: 4.1 UIU/ML (ref 0.27–4.2)
UROBILINOGEN UR QL STRIP: NORMAL
WBC NRBC COR # BLD: 5.51 10*3/MM3 (ref 3.4–10.8)

## 2022-11-02 PROCEDURE — G0103 PSA SCREENING: HCPCS

## 2022-11-02 PROCEDURE — 86141 C-REACTIVE PROTEIN HS: CPT

## 2022-11-02 PROCEDURE — 86803 HEPATITIS C AB TEST: CPT

## 2022-11-02 PROCEDURE — 80050 GENERAL HEALTH PANEL: CPT

## 2022-11-02 PROCEDURE — 81003 URINALYSIS AUTO W/O SCOPE: CPT

## 2022-11-08 ENCOUNTER — TELEPHONE (OUTPATIENT)
Dept: FAMILY MEDICINE CLINIC | Facility: CLINIC | Age: 62
End: 2022-11-08

## 2022-11-08 NOTE — TELEPHONE ENCOUNTER
----- Message from Rd Pabon MD sent at 11/4/2022  9:13 AM CDT -----  Please call the patient regarding his result.  Labs are good, will go over at next visit.

## 2022-12-14 ENCOUNTER — TELEPHONE (OUTPATIENT)
Dept: FAMILY MEDICINE CLINIC | Facility: CLINIC | Age: 62
End: 2022-12-14

## 2023-01-09 DIAGNOSIS — N52.31 ERECTILE DYSFUNCTION AFTER RADICAL PROSTATECTOMY: ICD-10-CM

## 2023-01-09 RX ORDER — TADALAFIL 20 MG/1
TABLET ORAL
Qty: 30 TABLET | Refills: 0 | Status: SHIPPED | OUTPATIENT
Start: 2023-01-09 | End: 2023-01-24 | Stop reason: SDUPTHER

## 2023-01-24 ENCOUNTER — OFFICE VISIT (OUTPATIENT)
Dept: FAMILY MEDICINE CLINIC | Facility: CLINIC | Age: 63
End: 2023-01-24
Payer: COMMERCIAL

## 2023-01-24 VITALS
SYSTOLIC BLOOD PRESSURE: 138 MMHG | DIASTOLIC BLOOD PRESSURE: 88 MMHG | BODY MASS INDEX: 28.47 KG/M2 | WEIGHT: 210.2 LBS | TEMPERATURE: 97.1 F | OXYGEN SATURATION: 99 % | HEIGHT: 72 IN | HEART RATE: 65 BPM

## 2023-01-24 DIAGNOSIS — M79.671 BILATERAL LEG AND FOOT PAIN: Chronic | ICD-10-CM

## 2023-01-24 DIAGNOSIS — R06.02 SHORTNESS OF BREATH: ICD-10-CM

## 2023-01-24 DIAGNOSIS — R12 HEART BURN: Chronic | ICD-10-CM

## 2023-01-24 DIAGNOSIS — M79.2 NEUROPATHIC PAIN: Chronic | ICD-10-CM

## 2023-01-24 DIAGNOSIS — M79.605 BILATERAL LEG AND FOOT PAIN: Chronic | ICD-10-CM

## 2023-01-24 DIAGNOSIS — N52.31 ERECTILE DYSFUNCTION AFTER RADICAL PROSTATECTOMY: ICD-10-CM

## 2023-01-24 DIAGNOSIS — J01.41 ACUTE RECURRENT PANSINUSITIS: Chronic | ICD-10-CM

## 2023-01-24 DIAGNOSIS — J30.9 CHRONIC ALLERGIC RHINITIS: Chronic | ICD-10-CM

## 2023-01-24 DIAGNOSIS — M79.604 BILATERAL LEG AND FOOT PAIN: Chronic | ICD-10-CM

## 2023-01-24 DIAGNOSIS — M79.672 BILATERAL LEG AND FOOT PAIN: Chronic | ICD-10-CM

## 2023-01-24 PROCEDURE — 99214 OFFICE O/P EST MOD 30 MIN: CPT | Performed by: FAMILY MEDICINE

## 2023-01-24 RX ORDER — AZELASTINE 1 MG/ML
2 SPRAY, METERED NASAL 2 TIMES DAILY
Qty: 30 ML | Refills: 6 | Status: SHIPPED | OUTPATIENT
Start: 2023-01-24

## 2023-01-24 RX ORDER — CETIRIZINE HYDROCHLORIDE 10 MG/1
10 TABLET ORAL DAILY
Qty: 90 TABLET | Refills: 0 | Status: SHIPPED | OUTPATIENT
Start: 2023-01-24

## 2023-01-24 RX ORDER — IBUPROFEN 800 MG/1
800 TABLET ORAL EVERY 8 HOURS PRN
Qty: 30 TABLET | Refills: 2 | Status: SHIPPED | OUTPATIENT
Start: 2023-01-24

## 2023-01-24 RX ORDER — GABAPENTIN 300 MG/1
300 CAPSULE ORAL 3 TIMES DAILY
Qty: 90 CAPSULE | Refills: 3 | Status: SHIPPED | OUTPATIENT
Start: 2023-01-24

## 2023-01-24 RX ORDER — ALBUTEROL SULFATE 90 UG/1
2 AEROSOL, METERED RESPIRATORY (INHALATION) EVERY 4 HOURS PRN
Qty: 18 G | Refills: 1 | Status: SHIPPED | OUTPATIENT
Start: 2023-01-24

## 2023-01-24 RX ORDER — CYCLOBENZAPRINE HCL 10 MG
10 TABLET ORAL NIGHTLY PRN
Qty: 90 TABLET | Refills: 2 | Status: SHIPPED | OUTPATIENT
Start: 2023-01-24

## 2023-01-24 RX ORDER — OMEPRAZOLE 40 MG/1
40 CAPSULE, DELAYED RELEASE ORAL DAILY
Qty: 90 CAPSULE | Refills: 0 | Status: SHIPPED | OUTPATIENT
Start: 2023-01-24

## 2023-01-24 RX ORDER — MONTELUKAST SODIUM 10 MG/1
10 TABLET ORAL NIGHTLY
Qty: 90 TABLET | Refills: 0 | Status: SHIPPED | OUTPATIENT
Start: 2023-01-24

## 2023-01-24 RX ORDER — SUCRALFATE 1 G/1
1 TABLET ORAL 4 TIMES DAILY PRN
Qty: 120 TABLET | Refills: 3 | Status: SHIPPED | OUTPATIENT
Start: 2023-01-24

## 2023-01-24 RX ORDER — TADALAFIL 20 MG/1
20 TABLET ORAL DAILY PRN
Qty: 30 TABLET | Refills: 3 | Status: SHIPPED | OUTPATIENT
Start: 2023-01-24

## 2023-01-24 NOTE — PROGRESS NOTES
Chief Complaint  Hypertension, Hyperlipidemia, Insomnia, and Neuropathy    Subjective          Review of Systems   Constitutional: Negative for activity change, appetite change and unexpected weight change.   HENT: Positive for hearing loss and tinnitus. Negative for dental problem, drooling, ear discharge, facial swelling, mouth sores, nosebleeds, postnasal drip, rhinorrhea, sinus pressure, trouble swallowing and voice change.    Eyes: Positive for itching. Negative for photophobia, pain, discharge, redness and visual disturbance.        Wears glasses     Eye exams with Dr. Glaser   Respiratory: Negative for apnea, chest tightness and stridor.    Cardiovascular: Negative for palpitations and leg swelling.        Past Neg Cardiac work up with Dr. Goode.     Recent chest wall pain after lifting, improved.   Gastrointestinal: Positive for heartburn. Negative for abdominal distention, anal bleeding, blood in stool and rectal pain.        Rectal stenosis    Feels like strained R groin lifting heavy roll of wire.    Endocrine: Negative for cold intolerance, heat intolerance, polydipsia, polyphagia and polyuria.   Genitourinary: Positive for enuresis. Negative for decreased urine volume, difficulty urinating, genital sores and penile swelling.        Small R hernia felt in scrotum at times, told OK    Musculoskeletal: Positive for back pain. Negative for gait problem and neck stiffness.        R foot, Plantar faciitis.  R calf pain.     L5 DDD  Occasional back pain more on R    R shoulder pain   Skin: Negative for color change, pallor and wound.        Eczema   Allergic/Immunologic: Positive for environmental allergies. Negative for food allergies and immunocompromised state.   Neurological: Positive for tremors. Negative for dizziness, seizures, syncope, facial asymmetry, speech difficulty and light-headedness.   Hematological: Negative for adenopathy. Bruises/bleeds easily.   Psychiatric/Behavioral: Positive for  sleep disturbance. Negative for agitation, behavioral problems, confusion, decreased concentration, dysphoric mood, hallucinations, self-injury and suicidal ideas. The patient has insomnia. The patient is not hyperactive.         Neurotin helps with pain and sleep,      Sleeps 5 hrs with med.        Guevara Meza presents to [facility] [place]  Hypertension  This is a chronic problem. The current episode started more than 1 year ago. The problem is controlled. Pertinent negatives include no palpitations. Agents associated with hypertension include decongestants and NSAIDs. Risk factors for coronary artery disease include male gender and dyslipidemia. Past treatments include ACE inhibitors. Current antihypertension treatment includes ACE inhibitors. The current treatment provides significant improvement.   Hyperlipidemia  This is a chronic problem. The current episode started more than 1 year ago. Recent lipid tests were reviewed and are variable. Associated symptoms include leg pain. The current treatment provides moderate improvement of lipids.   Leg Pain   There was no injury mechanism. The pain is present in the right leg and left leg. The pain is at a severity of 6/10. The pain is moderate. Associated symptoms comments: Cramps improving. He has tried rest (Neurontin) for the symptoms. The treatment provided mild relief.   Insomnia  This is a chronic problem. The current episode started more than 1 year ago. The problem occurs daily. The problem has been gradually improving. Treatments tried: OTC sleep aides. Flexeril helps. The treatment provided moderate relief.   Arm Pain   The incident occurred more than 1 week ago. The incident occurred at home. The injury mechanism was repetitive motion. The pain radiates to the left arm. The pain is at a severity of 4/10. The pain is moderate. The pain has been improving since the incident.   Allergies  This is a  "chronic problem. The current episode started more than 1 year ago. The problem has been waxing and waning. The symptoms are aggravated by coughing. Treatments tried: Antihistamines. The treatment provided mild relief.   Pain  This is a chronic problem. The current episode started more than 1 year ago. The problem occurs daily. The problem has been waxing and waning. Associated symptoms comments: Neuropathic foot and leg pain. The symptoms are aggravated by standing and walking. Treatments tried: shoe inserts. The treatment provided significant relief.   Heartburn  He complains of heartburn. This is a recurrent problem. The current episode started 1 to 4 weeks ago. The problem has been gradually worsening. The heartburn is of moderate intensity. The symptoms are aggravated by certain foods. He has tried a PPI and a histamine-2 antagonist for the symptoms. The treatment provided mild relief.   Erectile Dysfunction  This is a chronic problem. The problem has been gradually improving since onset. Past treatments include injection treatment. The treatment provided significant relief. Risk factors include prostate surgery.       Objective   Vital Signs:   /88 (BP Location: Right arm, Patient Position: Sitting, Cuff Size: Adult)   Pulse 65   Temp 97.1 °F (36.2 °C) (Temporal)   Ht 182.9 cm (72\")   Wt 95.3 kg (210 lb 3.2 oz)   SpO2 99%   BMI 28.51 kg/m²     Physical Exam  Vitals and nursing note reviewed.   Constitutional:       General: He is not in acute distress.     Appearance: Normal appearance. He is well-developed. He is not diaphoretic.   HENT:      Head: Normocephalic.      Comments: Hearing aides     Right Ear: Tympanic membrane, ear canal and external ear normal. There is no impacted cerumen.      Left Ear: Tympanic membrane, ear canal and external ear normal. There is no impacted cerumen.      Nose: No congestion or rhinorrhea.      Mouth/Throat:      Mouth: Mucous membranes are moist.   Eyes:      " General: No scleral icterus.        Right eye: No discharge.         Left eye: No discharge.      Conjunctiva/sclera: Conjunctivae normal.      Pupils: Pupils are equal, round, and reactive to light.   Neck:      Thyroid: No thyromegaly.      Vascular: No JVD.      Trachea: No tracheal deviation.   Cardiovascular:      Rate and Rhythm: Normal rate.      Heart sounds: Normal heart sounds. No murmur heard.    No friction rub. No gallop.   Pulmonary:      Effort: Pulmonary effort is normal. No respiratory distress.      Breath sounds: Normal breath sounds. No stridor. No wheezing, rhonchi or rales.   Chest:      Chest wall: No tenderness.   Abdominal:      General: Bowel sounds are normal. There is no distension.      Palpations: There is no mass.      Tenderness: There is no abdominal tenderness. There is no right CVA tenderness, left CVA tenderness, guarding or rebound.      Hernia: No hernia is present.   Musculoskeletal:         General: No tenderness or deformity.   Lymphadenopathy:      Cervical: No cervical adenopathy.   Skin:     General: Skin is warm and dry.      Capillary Refill: Capillary refill takes 2 to 3 seconds.      Coloration: Skin is not jaundiced or pale.      Findings: No bruising, erythema, lesion or rash.   Neurological:      General: No focal deficit present.      Mental Status: He is alert and oriented to person, place, and time.      Cranial Nerves: No cranial nerve deficit.      Motor: No weakness or abnormal muscle tone.      Coordination: Coordination normal.      Gait: Gait normal.      Deep Tendon Reflexes: Reflexes are normal and symmetric. Reflexes normal.   Psychiatric:         Mood and Affect: Mood normal.         Behavior: Behavior normal.         Thought Content: Thought content normal.         Judgment: Judgment normal.        Result Review :                 Assessment and Plan    Diagnoses and all orders for this visit:    1. Bilateral leg and foot pain  Comments:  Stable with  meds  Orders:  -     gabapentin (NEURONTIN) 300 MG capsule; Take 1 capsule by mouth 3 (Three) Times a Day.  Dispense: 90 capsule; Refill: 3  -     ibuprofen (ADVIL,MOTRIN) 800 MG tablet; Take 1 tablet by mouth Every 8 (Eight) Hours As Needed for Mild Pain.  Dispense: 30 tablet; Refill: 2  -     cyclobenzaprine (FLEXERIL) 10 MG tablet; Take 1 tablet by mouth At Night As Needed for Muscle Spasms.  Dispense: 90 tablet; Refill: 2    2. Neuropathic pain  Comments:  Stable with meds  Orders:  -     gabapentin (NEURONTIN) 300 MG capsule; Take 1 capsule by mouth 3 (Three) Times a Day.  Dispense: 90 capsule; Refill: 3    3. Shortness of breath  -     albuterol sulfate  (90 Base) MCG/ACT inhaler; Inhale 2 puffs Every 4 (Four) Hours As Needed for Wheezing or Shortness of Air.  Dispense: 18 g; Refill: 1    4. Chronic allergic rhinitis  -     azelastine (ASTELIN) 0.1 % nasal spray; 2 sprays into the nostril(s) as directed by provider 2 (Two) Times a Day. Use in each nostril as directed  Dispense: 30 mL; Refill: 6  -     montelukast (Singulair) 10 MG tablet; Take 1 tablet by mouth Every Night.  Dispense: 90 tablet; Refill: 0    5. Acute recurrent pansinusitis  -     cetirizine (zyrTEC) 10 MG tablet; Take 1 tablet by mouth Daily.  Dispense: 90 tablet; Refill: 0    6. Heart burn  Comments:  stable  Orders:  -     omeprazole (priLOSEC) 40 MG capsule; Take 1 capsule by mouth Daily.  Dispense: 90 capsule; Refill: 0  -     sucralfate (Carafate) 1 g tablet; Take 1 tablet by mouth 4 (Four) Times a Day As Needed (Stomach pain). Stomach pain  Dispense: 120 tablet; Refill: 3    7. Erectile dysfunction after radical prostatectomy  -     tadalafil (CIALIS) 20 MG tablet; Take 1 tablet by mouth Daily As Needed for Erectile Dysfunction.  Dispense: 30 tablet; Refill: 3        Follow Up   Return in about 4 months (around 5/24/2023).  Patient was given instructions and counseling regarding his condition or for health maintenance advice.  Please see specific information pulled into the AVS if appropriate.         This document has been electronically signed by Rd Pabon MD on January 24, 2023 12:04 CST

## 2023-03-08 ENCOUNTER — OFFICE VISIT (OUTPATIENT)
Dept: FAMILY MEDICINE CLINIC | Facility: CLINIC | Age: 63
End: 2023-03-08
Payer: COMMERCIAL

## 2023-03-08 VITALS
SYSTOLIC BLOOD PRESSURE: 120 MMHG | TEMPERATURE: 97.8 F | HEIGHT: 72 IN | HEART RATE: 64 BPM | DIASTOLIC BLOOD PRESSURE: 88 MMHG | BODY MASS INDEX: 28.31 KG/M2 | WEIGHT: 209 LBS | OXYGEN SATURATION: 99 %

## 2023-03-08 DIAGNOSIS — J32.4 CHRONIC PANSINUSITIS: Chronic | ICD-10-CM

## 2023-03-08 DIAGNOSIS — J30.9 CHRONIC ALLERGIC RHINITIS: Primary | Chronic | ICD-10-CM

## 2023-03-08 DIAGNOSIS — R31.29 OTHER MICROSCOPIC HEMATURIA: ICD-10-CM

## 2023-03-08 DIAGNOSIS — R10.9 RIGHT FLANK PAIN: ICD-10-CM

## 2023-03-08 LAB
BILIRUB BLD-MCNC: NEGATIVE MG/DL
CLARITY, POC: CLEAR
COLOR UR: YELLOW
EXPIRATION DATE: ABNORMAL
GLUCOSE UR STRIP-MCNC: NEGATIVE MG/DL
KETONES UR QL: NEGATIVE
LEUKOCYTE EST, POC: NEGATIVE
Lab: ABNORMAL
NITRITE UR-MCNC: NEGATIVE MG/ML
PH UR: 6 [PH] (ref 5–8)
PROT UR STRIP-MCNC: NEGATIVE MG/DL
RBC # UR STRIP: ABNORMAL /UL
SP GR UR: 1.01 (ref 1–1.03)
UROBILINOGEN UR QL: NORMAL

## 2023-03-08 PROCEDURE — 99214 OFFICE O/P EST MOD 30 MIN: CPT | Performed by: NURSE PRACTITIONER

## 2023-03-08 PROCEDURE — 81003 URINALYSIS AUTO W/O SCOPE: CPT | Performed by: NURSE PRACTITIONER

## 2023-03-08 PROCEDURE — 96372 THER/PROPH/DIAG INJ SC/IM: CPT | Performed by: NURSE PRACTITIONER

## 2023-03-08 RX ORDER — TRIAMCINOLONE ACETONIDE 40 MG/ML
80 INJECTION, SUSPENSION INTRA-ARTICULAR; INTRAMUSCULAR ONCE
Status: COMPLETED | OUTPATIENT
Start: 2023-03-08 | End: 2023-03-08

## 2023-03-08 RX ORDER — TRIAMCINOLONE ACETONIDE 1 MG/G
CREAM TOPICAL
COMMUNITY
Start: 2023-02-07

## 2023-03-08 RX ADMIN — TRIAMCINOLONE ACETONIDE 80 MG: 40 INJECTION, SUSPENSION INTRA-ARTICULAR; INTRAMUSCULAR at 15:51

## 2023-03-08 NOTE — PROGRESS NOTES
"Chief Complaint  Illness (Facial pressure off and on since Nov.) and Pain (Right side )    Subjective          Guevara Meza presents to [facility] [place]    History of Present Illness  [facility]    PCP: Dr. Pabon    CC: \"facial pressure; right side pain\"    C/O chronic seasonal allergies.  Has had off/on sinus pressure, mostly right maxillary sinus since November.  Seen at [facility] at that time, per office notes, looks like CT was planned, but patient reports that he was never notified with an appointment and wasn't sure what follow up plans were supposed to be.  At that time, was given Zpak, but reports not real improvement in symptoms.  Taking ceterizine, montelukast, astelin and sinus rinses BID with mild relief only.  Mostly drainage is clear, occasionally thick mucus.  No fevers. Has seen Dr. Whitney in the past as well for allergy testing and was told he had multiple allergies, but allergy injections were not recommended. Steroid injections have helped previously, last was in August 2022.    C/O right flank pain beginning today with occasional dysuria.  Concerned for UTI.  No hx of renal stones.  Does see urology for hx of prostatectomy.  Doesn't drink much water, usually only drinks coffee and tea.  Has had hematuria in the past off/on, last check was November and negative for hematuria at that time.      BP elevated today, normally running 130's/80's.  Currently taking Ramipril 10 mg daily.  Does report he recently stopped taking gabapentin due to a reported lawsuit he saw involving it.          Review of Systems   Constitutional: Negative.    HENT: Positive for congestion, postnasal drip, rhinorrhea, sinus pressure and sinus pain (moreso over right maxillary sinus ). Negative for ear discharge, ear pain, sneezing, sore throat and trouble swallowing.    Eyes: Negative.    Respiratory: Negative.    Cardiovascular: Negative.  " "  Gastrointestinal: Negative.    Genitourinary: Positive for dysuria (mild) and flank pain (right). Negative for frequency and urgency.   Skin: Negative.    Neurological: Negative.         Objective   Vital Signs:   /100 (BP Location: Right arm, Patient Position: Sitting, Cuff Size: Adult)   Pulse 64   Temp 97.8 °F (36.6 °C) (Oral)   Ht 182.9 cm (72\")   Wt 94.8 kg (209 lb)   SpO2 99%   BMI 28.35 kg/m²       Physical Exam  Vitals and nursing note reviewed.   Constitutional:       General: He is not in acute distress.     Appearance: He is not ill-appearing.   HENT:      Head: Normocephalic and atraumatic.      Right Ear: Tympanic membrane and ear canal normal.      Left Ear: Ear canal normal. A middle ear effusion (small) is present. Tympanic membrane is not erythematous.      Nose: Mucosal edema (pale/swollen) and congestion present.      Right Sinus: Maxillary sinus tenderness and frontal sinus tenderness present.      Left Sinus: Maxillary sinus tenderness and frontal sinus tenderness present.      Comments: Generalized pressure/tenderness over all sinuses, slightly moreso over right maxillary     Mouth/Throat:      Mouth: Mucous membranes are moist.      Pharynx: No oropharyngeal exudate or posterior oropharyngeal erythema.      Comments: Moderate clear PND  Eyes:      General:         Right eye: No discharge.         Left eye: No discharge.      Conjunctiva/sclera: Conjunctivae normal.   Cardiovascular:      Rate and Rhythm: Normal rate and regular rhythm.   Pulmonary:      Effort: Pulmonary effort is normal. No respiratory distress.      Breath sounds: Normal breath sounds. No wheezing, rhonchi or rales.   Abdominal:      General: Bowel sounds are normal.      Palpations: Abdomen is soft.      Tenderness: There is no abdominal tenderness. There is right CVA tenderness ( mild). There is no left CVA tenderness, guarding or rebound.   Musculoskeletal:      Cervical back: Neck supple. No tenderness. "   Lymphadenopathy:      Cervical: No cervical adenopathy.   Skin:     General: Skin is warm and dry.   Neurological:      General: No focal deficit present.      Mental Status: He is alert and oriented to person, place, and time.   Psychiatric:         Mood and Affect: Mood normal.         Thought Content: Thought content normal.          Result Review :     Common labs    Common Labs 11/2/22 11/2/22 11/2/22    0828 0828 0828   Glucose  84    BUN  12    Creatinine  0.87    Sodium  140    Potassium  4.2    Chloride  106    Calcium  9.4    Albumin  4.40    Total Bilirubin  0.4    Alkaline Phosphatase  75    AST (SGOT)  17    ALT (SGPT)  14    WBC 5.51     Hemoglobin 13.9     Hematocrit 40.6     Platelets 214     PSA   <0.014           Recent Results (from the past 24 hour(s))   POCT urinalysis dipstick, automated    Collection Time: 03/08/23  3:04 PM    Specimen: Urine   Result Value Ref Range    Color Yellow Yellow, Straw, Dark Yellow, Michelle    Clarity, UA Clear Clear    Specific Gravity  1.015 1.005 - 1.030    pH, Urine 6.0 5.0 - 8.0    Leukocytes Negative Negative    Nitrite, UA Negative Negative    Protein, POC Negative Negative mg/dL    Glucose, UA Negative (A) Negative mg/dL    Ketones, UA Negative Negative    Urobilinogen, UA Normal Normal, 0.2 E.U./dL    Bilirubin Negative Negative    Blood, UA 1+ (A) Negative    Lot Number [id number]     Expiration Date 02/10/24                  Assessment and Plan    Diagnoses and all orders for this visit:    1. Chronic allergic rhinitis (Primary)  -     triamcinolone acetonide (KENALOG-40) injection 80 mg    2. Right flank pain  -     POCT urinalysis dipstick, automated  -     Urinalysis, Microscopic Only - Urine, Clean Catch  -     Urine Culture - Urine, Urine, Clean Catch  -     XR abdomen kub    3. Chronic pansinusitis      Increase water intake.   KUB today.   Will send urine for micro and culture.    Will notify with results when available.   Tylenol  PRN    Kenalog 80 mg IM x 1 in office for chronic allergy symptoms/sinus pressure  Will check with [facility] regarding status of CT sinuses and recommended follow up appointments and notify patient    See PCP or RTC if symptoms persist/worsen  See PCP for routine f/u visit and management of chronic medical conditions      This document has been electronically signed by RON Stallworth on March 8, 2023 16:03 CST,.    I spent 35 minutes caring for Guevara on this date of service. This time includes time spent by me in the following activities:preparing for the visit, reviewing tests, obtaining and/or reviewing a separately obtained history, performing a medically appropriate examination and/or evaluation , counseling and educating the patient/family/caregiver, ordering medications, tests, or procedures, referring and communicating with other health care professionals  and documenting information in the medical record

## 2023-03-09 ENCOUNTER — TELEPHONE (OUTPATIENT)
Dept: FAMILY MEDICINE CLINIC | Facility: CLINIC | Age: 63
End: 2023-03-09
Payer: COMMERCIAL

## 2023-03-09 LAB
BACTERIA UR QL AUTO: NORMAL /HPF
HYALINE CASTS UR QL AUTO: NORMAL /LPF
RBC # UR STRIP: NORMAL /HPF
REF LAB TEST METHOD: NORMAL
SQUAMOUS #/AREA URNS HPF: NORMAL /HPF
WBC # UR STRIP: NORMAL /HPF

## 2023-03-09 PROCEDURE — 81015 MICROSCOPIC EXAM OF URINE: CPT | Performed by: NURSE PRACTITIONER

## 2023-03-09 PROCEDURE — 87086 URINE CULTURE/COLONY COUNT: CPT | Performed by: NURSE PRACTITIONER

## 2023-03-09 NOTE — TELEPHONE ENCOUNTER
Reina Alba MA Ross, Jamie V, APRN  Patient notified           Previous Messages       ----- Message -----   From: William Franco APRN   Sent: 3/9/2023   4:00 PM CST   To: Reina Alba MA   Subject: RE: ENT--sinus CT                                 Can you make patient aware?  If he doesn't hear anything from ENT office in a week or so, ask him to call their office directly to inquire.     ----- Message -----   From: Reina Alba MA   Sent: 3/9/2023   3:48 PM CST   To: RON Lu   Subject: FW: ENT--sinus CT                                 ENT called back and said they would check CT to be scheduled at [facility].   ----- Message -----   From: William Franco APRN   Sent: 3/8/2023   3:46 PM CST   To: Reina Alba MA   Subject: ENT--sinus CT                                     Patient seen by ENT at [facility] in November.  Per office note, looks like he was supposed to have CT of sinuses, but reports he never was notified of appointment to have this done.  Can you call [facility] and see if CT was ordered and scheduled and see what their plans were for follow up with him?

## 2023-03-10 LAB — BACTERIA SPEC AEROBE CULT: NORMAL

## 2023-03-20 ENCOUNTER — TELEPHONE (OUTPATIENT)
Dept: FAMILY MEDICINE CLINIC | Facility: CLINIC | Age: 63
End: 2023-03-20
Payer: COMMERCIAL

## 2023-03-20 ENCOUNTER — TELEPHONE (OUTPATIENT)
Dept: FAMILY MEDICINE CLINIC | Facility: CLINIC | Age: 63
End: 2023-03-20

## 2023-03-20 RX ORDER — AZITHROMYCIN 250 MG/1
TABLET, FILM COATED ORAL
Qty: 6 TABLET | Refills: 0 | Status: SHIPPED | OUTPATIENT
Start: 2023-03-20

## 2023-03-20 NOTE — TELEPHONE ENCOUNTER
Patient is having yellow drainage, no fever, body aches, or ha.  He got better after steroid injection, but feels increased pollen as caused a sinus infection.  ENT scheduled him for CT of next week, but would not treat his sinus.  He would prefer you send in a antibiotic to [facility].

## 2023-03-20 NOTE — TELEPHONE ENCOUNTER
Patient called stating he's still having some of the same symptoms as he did last appointment with [facility] on 03/08/23, but doing better, and wants to know if she could call something in to clear the rest of it up. He scheduled an appointment for this afternoon with [facility] in case he needed to still be seen again.  Call back number: [phone]

## 2023-04-20 DIAGNOSIS — I10 ESSENTIAL HYPERTENSION: ICD-10-CM

## 2023-04-20 RX ORDER — RAMIPRIL 10 MG/1
10 CAPSULE ORAL DAILY
Qty: 30 CAPSULE | Refills: 0 | Status: SHIPPED | OUTPATIENT
Start: 2023-04-20

## 2023-05-19 DIAGNOSIS — I10 ESSENTIAL HYPERTENSION: ICD-10-CM

## 2023-05-19 RX ORDER — RAMIPRIL 10 MG/1
10 CAPSULE ORAL DAILY
Qty: 30 CAPSULE | Refills: 0 | Status: SHIPPED | OUTPATIENT
Start: 2023-05-19

## 2023-05-24 ENCOUNTER — OFFICE VISIT (OUTPATIENT)
Dept: FAMILY MEDICINE CLINIC | Facility: CLINIC | Age: 63
End: 2023-05-24
Payer: COMMERCIAL

## 2023-05-24 VITALS
HEIGHT: 72 IN | TEMPERATURE: 98 F | HEART RATE: 65 BPM | DIASTOLIC BLOOD PRESSURE: 82 MMHG | SYSTOLIC BLOOD PRESSURE: 124 MMHG | BODY MASS INDEX: 27.59 KG/M2 | WEIGHT: 203.7 LBS | OXYGEN SATURATION: 96 %

## 2023-05-24 DIAGNOSIS — M79.605 BILATERAL LEG AND FOOT PAIN: Chronic | ICD-10-CM

## 2023-05-24 DIAGNOSIS — M79.2 NEUROPATHIC PAIN: Chronic | ICD-10-CM

## 2023-05-24 DIAGNOSIS — J30.9 CHRONIC ALLERGIC RHINITIS: Chronic | ICD-10-CM

## 2023-05-24 DIAGNOSIS — M79.672 BILATERAL LEG AND FOOT PAIN: Chronic | ICD-10-CM

## 2023-05-24 DIAGNOSIS — N52.31 ERECTILE DYSFUNCTION AFTER RADICAL PROSTATECTOMY: ICD-10-CM

## 2023-05-24 DIAGNOSIS — I10 ESSENTIAL HYPERTENSION: ICD-10-CM

## 2023-05-24 DIAGNOSIS — M79.671 BILATERAL LEG AND FOOT PAIN: Chronic | ICD-10-CM

## 2023-05-24 DIAGNOSIS — R12 HEART BURN: Chronic | ICD-10-CM

## 2023-05-24 DIAGNOSIS — J32.4 CHRONIC PANSINUSITIS: Chronic | ICD-10-CM

## 2023-05-24 DIAGNOSIS — M79.604 BILATERAL LEG AND FOOT PAIN: Chronic | ICD-10-CM

## 2023-05-24 DIAGNOSIS — R06.02 SHORTNESS OF BREATH: ICD-10-CM

## 2023-05-24 RX ORDER — RAMIPRIL 10 MG/1
10 CAPSULE ORAL DAILY
Qty: 30 CAPSULE | Refills: 0 | Status: SHIPPED | OUTPATIENT
Start: 2023-05-24

## 2023-05-24 RX ORDER — CETIRIZINE HYDROCHLORIDE 10 MG/1
10 TABLET ORAL DAILY
Qty: 90 TABLET | Refills: 0 | Status: SHIPPED | OUTPATIENT
Start: 2023-05-24

## 2023-05-24 RX ORDER — ALBUTEROL SULFATE 90 UG/1
2 AEROSOL, METERED RESPIRATORY (INHALATION) EVERY 4 HOURS PRN
Qty: 18 G | Refills: 1 | Status: SHIPPED | OUTPATIENT
Start: 2023-05-24

## 2023-05-24 RX ORDER — TADALAFIL 20 MG/1
20 TABLET ORAL DAILY PRN
Qty: 30 TABLET | Refills: 3 | Status: SHIPPED | OUTPATIENT
Start: 2023-05-24

## 2023-05-24 RX ORDER — OMEPRAZOLE 40 MG/1
40 CAPSULE, DELAYED RELEASE ORAL DAILY
Qty: 90 CAPSULE | Refills: 0 | Status: SHIPPED | OUTPATIENT
Start: 2023-05-24

## 2023-05-24 RX ORDER — AZELASTINE 1 MG/ML
2 SPRAY, METERED NASAL 2 TIMES DAILY
Qty: 30 ML | Refills: 2 | Status: SHIPPED | OUTPATIENT
Start: 2023-05-24

## 2023-05-24 RX ORDER — GABAPENTIN 300 MG/1
300 CAPSULE ORAL 3 TIMES DAILY
Qty: 90 CAPSULE | Refills: 3 | Status: SHIPPED | OUTPATIENT
Start: 2023-05-24

## 2023-05-24 RX ORDER — MONTELUKAST SODIUM 10 MG/1
10 TABLET ORAL NIGHTLY
Qty: 90 TABLET | Refills: 0 | Status: SHIPPED | OUTPATIENT
Start: 2023-05-24

## 2023-05-24 NOTE — PROGRESS NOTES
Chief Complaint  Hypertension, Hyperlipidemia, Leg Pain, Insomnia, and Foot Pain  ' Saw Dr. Ren SCOTT Sinus needs surgery to open'.  Subjective          Review of Systems   Constitutional: Negative for activity change, appetite change and unexpected weight change.   HENT: Positive for hearing loss, sinus pressure and tinnitus. Negative for dental problem, drooling, ear discharge, facial swelling, mouth sores, nosebleeds, postnasal drip, rhinorrhea, trouble swallowing and voice change.    Eyes: Positive for itching. Negative for photophobia, pain, discharge, redness and visual disturbance.        Wears glasses     Eye exams with Dr. Glaser   Respiratory: Negative for apnea, chest tightness and stridor.    Cardiovascular: Negative for palpitations and leg swelling.        Past Neg Cardiac work up with Dr. Goode   Gastrointestinal: Positive for heartburn. Negative for abdominal distention, anal bleeding, blood in stool and rectal pain.        Rectal stenosis    Feels like strained R groin lifting heavy roll of wire.    Endocrine: Negative for cold intolerance, heat intolerance, polydipsia, polyphagia and polyuria.   Genitourinary: Positive for enuresis. Negative for decreased urine volume, difficulty urinating, genital sores and penile swelling.        Small R hernia felt in scrotum at times, told OK    Musculoskeletal: Positive for back pain. Negative for gait problem and neck stiffness.        R foot, Plantar faciitis.  R calf pain.     L5 DDD  Occasional back pain more on R       Skin: Negative for color change, pallor and wound.        Eczema   Allergic/Immunologic: Positive for environmental allergies. Negative for food allergies and immunocompromised state.   Neurological: Positive for tremors and headaches. Negative for dizziness, seizures, syncope, facial asymmetry, speech difficulty and light-headedness.   Hematological: Negative for adenopathy. Bruises/bleeds easily.   Psychiatric/Behavioral: Positive for  sleep disturbance. Negative for agitation, behavioral problems, confusion, decreased concentration, dysphoric mood, hallucinations, self-injury and suicidal ideas. The patient has insomnia. The patient is not hyperactive.         Neurotin helps with pain and sleep,      Sleeps 5 hrs with med.        Guevara Meza presents to [facility]  Hypertension  This is a chronic problem. The current episode started more than 1 year ago. The problem is controlled. Associated symptoms include headaches. Pertinent negatives include no palpitations. Agents associated with hypertension include decongestants and NSAIDs. Risk factors for coronary artery disease include male gender and dyslipidemia. Past treatments include ACE inhibitors. Current antihypertension treatment includes ACE inhibitors. The current treatment provides significant improvement.   Hyperlipidemia  This is a chronic problem. The current episode started more than 1 year ago. Recent lipid tests were reviewed and are variable. Associated symptoms include leg pain. The current treatment provides moderate improvement of lipids.   Leg Pain   There was no injury mechanism. The pain is present in the right leg and left leg. The pain is at a severity of 6/10. The pain is moderate. Associated symptoms comments: Cramps improving. He has tried rest (Neurontin) for the symptoms. The treatment provided mild relief.   Insomnia  This is a chronic problem. The current episode started more than 1 year ago. The problem occurs daily. The problem has been gradually improving. Associated symptoms include headaches. Treatments tried: OTC sleep aides. Flexeril helps. The treatment provided moderate relief.   Foot Pain  This is a chronic problem. The current episode started more than 1 year ago. The problem occurs daily. The problem has been waxing and waning. Associated symptoms include headaches. The symptoms are aggravated  by standing and walking. Treatments tried: Neurontin. The treatment provided mild relief.   Arm Pain   The incident occurred more than 1 week ago. The incident occurred at home. The injury mechanism was repetitive motion. The pain radiates to the left arm. The pain is at a severity of 4/10. The pain is moderate. The pain has been improving since the incident. Treatments tried: Neurontin, NSAIDs.   Allergies  This is a chronic problem. The current episode started more than 1 year ago. The problem has been waxing and waning. Associated symptoms include headaches. The symptoms are aggravated by coughing. Treatments tried: Antihistamines. The treatment provided mild relief.   Pain  This is a chronic problem. The current episode started more than 1 year ago. The problem occurs daily. The problem has been waxing and waning. Associated symptoms include headaches. Associated symptoms comments: Neuropathic foot and leg pain. The symptoms are aggravated by standing and walking. Treatments tried: shoe inserts, NSAIDs. The treatment provided significant relief.   Heartburn  He complains of heartburn. This is a recurrent problem. The current episode started 1 to 4 weeks ago. The problem has been gradually worsening. The heartburn is of moderate intensity. The symptoms are aggravated by certain foods. He has tried a PPI and a histamine-2 antagonist for the symptoms. The treatment provided mild relief.   Erectile Dysfunction  This is a chronic problem. The problem has been gradually improving since onset. Past treatments include injection treatment. The treatment provided significant relief. Risk factors include prostate surgery.   Sinus Problem  This is a chronic problem. The current episode started more than 1 year ago. The problem has been waxing and waning since onset. Associated symptoms include headaches and sinus pressure. Treatments tried: ENT Consult. The treatment provided no relief.       Objective   Vital Signs:   BP  "124/82 (BP Location: Left arm, Patient Position: Sitting, Cuff Size: Adult)   Pulse 65   Temp 98 °F (36.7 °C) (Temporal)   Ht 182.9 cm (72\")   Wt 92.4 kg (203 lb 11.2 oz)   SpO2 96%   BMI 27.63 kg/m²     Physical Exam  Vitals and nursing note reviewed.   Constitutional:       General: He is not in acute distress.     Appearance: Normal appearance. He is well-developed. He is not diaphoretic.   HENT:      Head: Normocephalic.      Comments: Hearing aides     Right Ear: Tympanic membrane, ear canal and external ear normal. There is no impacted cerumen.      Left Ear: Tympanic membrane, ear canal and external ear normal. There is no impacted cerumen.      Nose: No congestion or rhinorrhea.      Mouth/Throat:      Mouth: Mucous membranes are moist.   Eyes:      General: No scleral icterus.        Right eye: No discharge.         Left eye: No discharge.      Conjunctiva/sclera: Conjunctivae normal.      Pupils: Pupils are equal, round, and reactive to light.   Neck:      Thyroid: No thyromegaly.      Vascular: No JVD.      Trachea: No tracheal deviation.   Cardiovascular:      Rate and Rhythm: Normal rate.      Heart sounds: Normal heart sounds. No murmur heard.    No friction rub. No gallop.   Pulmonary:      Effort: Pulmonary effort is normal. No respiratory distress.      Breath sounds: Normal breath sounds. No stridor. No wheezing, rhonchi or rales.   Chest:      Chest wall: No tenderness.   Abdominal:      General: Bowel sounds are normal. There is no distension.      Palpations: There is no mass.      Tenderness: There is no abdominal tenderness. There is no right CVA tenderness, left CVA tenderness, guarding or rebound.      Hernia: No hernia is present.   Musculoskeletal:         General: No tenderness or deformity.   Lymphadenopathy:      Cervical: No cervical adenopathy.   Skin:     General: Skin is warm and dry.      Capillary Refill: Capillary refill takes 2 to 3 seconds.      Coloration: Skin is not " jaundiced or pale.      Findings: No bruising, erythema, lesion or rash.   Neurological:      General: No focal deficit present.      Mental Status: He is alert and oriented to person, place, and time.      Cranial Nerves: No cranial nerve deficit.      Motor: No weakness or abnormal muscle tone.      Coordination: Coordination normal.      Gait: Gait normal.      Deep Tendon Reflexes: Reflexes are normal and symmetric. Reflexes normal.   Psychiatric:         Mood and Affect: Mood normal.         Behavior: Behavior normal.         Thought Content: Thought content normal.         Judgment: Judgment normal.        Result Review :                 Assessment and Plan    Diagnoses and all orders for this visit:    1. Bilateral leg and foot pain  Comments:  Stable with meds  Orders:  -     gabapentin (NEURONTIN) 300 MG capsule; Take 1 capsule by mouth 3 (Three) Times a Day.  Dispense: 90 capsule; Refill: 3    2. Neuropathic pain  Comments:  Stable with meds  Orders:  -     gabapentin (NEURONTIN) 300 MG capsule; Take 1 capsule by mouth 3 (Three) Times a Day.  Dispense: 90 capsule; Refill: 3    3. Shortness of breath  -     albuterol sulfate  (90 Base) MCG/ACT inhaler; Inhale 2 puffs Every 4 (Four) Hours As Needed for Wheezing or Shortness of Air.  Dispense: 18 g; Refill: 1    4. Chronic allergic rhinitis  -     azelastine (ASTELIN) 0.1 % nasal spray; 2 sprays into the nostril(s) as directed by provider 2 (Two) Times a Day. Use in each nostril as directed  Dispense: 30 mL; Refill: 2  -     cetirizine (zyrTEC) 10 MG tablet; Take 1 tablet by mouth Daily.  Dispense: 90 tablet; Refill: 0  -     montelukast (Singulair) 10 MG tablet; Take 1 tablet by mouth Every Night.  Dispense: 90 tablet; Refill: 0    5. Heart burn  Comments:  stable  Orders:  -     omeprazole (priLOSEC) 40 MG capsule; Take 1 capsule by mouth Daily.  Dispense: 90 capsule; Refill: 0    6. Essential hypertension  -     ramipril (ALTACE) 10 MG capsule; Take  1 capsule by mouth Daily.  Dispense: 30 capsule; Refill: 0    7. Erectile dysfunction after radical prostatectomy  -     tadalafil (CIALIS) 20 MG tablet; Take 1 tablet by mouth Daily As Needed for Erectile Dysfunction.  Dispense: 30 tablet; Refill: 3    8. Chronic pansinusitis  Comments:  samples sinus rinse given  Orders:  -     cetirizine (zyrTEC) 10 MG tablet; Take 1 tablet by mouth Daily.  Dispense: 90 tablet; Refill: 0        Follow Up   Return in about 3 months (around 8/24/2023), or if symptoms worsen or fail to improve.  Patient was given instructions and counseling regarding his condition or for health maintenance advice. Please see specific information pulled into the AVS if appropriate.         This document has been electronically signed by Rd Pabon MD on May 25, 2023 09:28 CDT

## 2023-08-08 DIAGNOSIS — J01.41 ACUTE RECURRENT PANSINUSITIS: ICD-10-CM

## 2023-08-08 RX ORDER — PREDNISONE 10 MG/1
10 TABLET ORAL SEE ADMIN INSTRUCTIONS
Qty: 17 TABLET | Refills: 0 | Status: SHIPPED | OUTPATIENT
Start: 2023-08-08

## 2023-08-08 RX ORDER — PREDNISONE 10 MG/1
TABLET ORAL
Qty: 17 TABLET | Refills: 0 | OUTPATIENT
Start: 2023-08-08

## 2023-08-08 NOTE — TELEPHONE ENCOUNTER
Patient would like a call back in regards to his prednisone medication refill.  Call back number: [phone]

## 2023-08-08 NOTE — TELEPHONE ENCOUNTER
Spoke with pt and he stated that he had gotten prednisone in the past for his dermatitis and eczema.  He stated that it is starting to come back. He is asking for a refill on the prednisone. He stated that he only takes it when he needs it.

## 2023-09-18 ENCOUNTER — OFFICE VISIT (OUTPATIENT)
Dept: FAMILY MEDICINE CLINIC | Facility: CLINIC | Age: 63
End: 2023-09-18
Payer: MEDICARE

## 2023-09-18 VITALS
HEIGHT: 72 IN | WEIGHT: 204.7 LBS | DIASTOLIC BLOOD PRESSURE: 80 MMHG | OXYGEN SATURATION: 99 % | TEMPERATURE: 97.3 F | BODY MASS INDEX: 27.73 KG/M2 | SYSTOLIC BLOOD PRESSURE: 126 MMHG | HEART RATE: 60 BPM

## 2023-09-18 DIAGNOSIS — M79.2 NEUROPATHIC PAIN: Chronic | ICD-10-CM

## 2023-09-18 DIAGNOSIS — I10 ESSENTIAL HYPERTENSION: ICD-10-CM

## 2023-09-18 DIAGNOSIS — M79.671 BILATERAL LEG AND FOOT PAIN: Chronic | ICD-10-CM

## 2023-09-18 DIAGNOSIS — E78.5 HYPERLIPIDEMIA, UNSPECIFIED HYPERLIPIDEMIA TYPE: Chronic | ICD-10-CM

## 2023-09-18 DIAGNOSIS — I10 ESSENTIAL HYPERTENSION: Chronic | ICD-10-CM

## 2023-09-18 DIAGNOSIS — M79.604 BILATERAL LEG AND FOOT PAIN: Chronic | ICD-10-CM

## 2023-09-18 DIAGNOSIS — M79.605 BILATERAL LEG AND FOOT PAIN: Chronic | ICD-10-CM

## 2023-09-18 DIAGNOSIS — M79.672 BILATERAL LEG AND FOOT PAIN: Chronic | ICD-10-CM

## 2023-09-18 PROCEDURE — 3074F SYST BP LT 130 MM HG: CPT | Performed by: FAMILY MEDICINE

## 2023-09-18 PROCEDURE — 3079F DIAST BP 80-89 MM HG: CPT | Performed by: FAMILY MEDICINE

## 2023-09-18 PROCEDURE — 99214 OFFICE O/P EST MOD 30 MIN: CPT | Performed by: FAMILY MEDICINE

## 2023-09-18 RX ORDER — GABAPENTIN 300 MG/1
300 CAPSULE ORAL 3 TIMES DAILY
Qty: 90 CAPSULE | Refills: 3 | Status: SHIPPED | OUTPATIENT
Start: 2023-09-18

## 2023-09-18 NOTE — PROGRESS NOTES
Chief Complaint  Hypertension, Hyperlipidemia, Insomnia, and Pain (Neuropathic)    Subjective     {Problem List  Visit Diagnosis   Encounters  Notes  Medications  Labs  Result Review Imaging  Media :23}     Review of Systems   Constitutional:  Negative for activity change, appetite change and unexpected weight change.   HENT:  Positive for hearing loss, sinus pressure and tinnitus. Negative for dental problem, drooling, ear discharge, facial swelling, mouth sores, nosebleeds, postnasal drip, rhinorrhea, trouble swallowing and voice change.    Eyes:  Positive for itching. Negative for photophobia, pain, discharge, redness and visual disturbance.        Wears glasses     Eye exams with Dr. Glaser   Respiratory:  Negative for apnea, chest tightness and stridor.    Cardiovascular:  Negative for palpitations and leg swelling.        Past Neg Cardiac work up with Dr. Goode   Gastrointestinal:  Negative for abdominal distention, anal bleeding, blood in stool and rectal pain.        Rectal stenosis    Feels like strained R groin lifting heavy roll of wire.    Endocrine: Negative for cold intolerance, heat intolerance, polydipsia, polyphagia and polyuria.   Genitourinary:  Positive for enuresis. Negative for decreased urine volume, difficulty urinating, genital sores and penile swelling.        Small R hernia felt in scrotum at times, told OK    Musculoskeletal:  Positive for back pain. Negative for gait problem and neck stiffness.        R foot, Plantar faciitis.  R calf pain.     L5 DDD  Occasional back pain more on R       Skin:  Negative for color change, pallor and wound.        Eczema   Allergic/Immunologic: Positive for environmental allergies. Negative for food allergies and immunocompromised state.   Neurological:  Positive for tremors and headaches. Negative for dizziness, seizures, syncope, facial asymmetry, speech difficulty and light-headedness.   Hematological:  Negative for adenopathy.  "Bruises/bleeds easily.   Psychiatric/Behavioral:  Positive for sleep disturbance. Negative for agitation, behavioral problems, confusion, decreased concentration, dysphoric mood, hallucinations, self-injury and suicidal ideas. The patient has insomnia. The patient is not hyperactive.         Neurotin helps with pain and sleep,      Sleeps 5 hrs with med.      Guevara Meza presents to [facility] [place]  Hypertension  Associated symptoms include headaches. Pertinent negatives include no palpitations.   Hyperlipidemia    Insomnia  This is a chronic problem. The current episode started more than 1 year ago. The problem occurs daily. The problem has been gradually improving. Associated symptoms include headaches. Treatments tried: OTC sleep aides. Flexeril helps. The treatment provided moderate relief.   Pain  Associated symptoms include headaches.     Objective   Vital Signs:   /80 (BP Location: Right arm, Patient Position: Sitting, Cuff Size: Adult)   Pulse 60   Temp 97.3 °F (36.3 °C) (Temporal)   Ht 182.9 cm (72\")   Wt 92.9 kg (204 lb 11.2 oz)   SpO2 99%   BMI 27.76 kg/m²     Physical Exam   Result Review :{Labs  Result Review  Imaging  Med Tab  Media :23}   {The following data was reviewed by (Optional):89367}  {Ambulatory Labs (Optional):65470}  {Data reviewed (Optional):69143:::1}          Assessment and Plan {CC Problem List  Visit Diagnosis  ROS  Review (Popup)  Health Maintenance  Quality  BestPractice  Medications  SmartSets  SnapShot Encounters  Media :23}   There are no diagnoses linked to this encounter.  {Time Spent (Optional):66116}  Follow Up {Instructions Charge Capture  Follow-up Communications :23}  No follow-ups on file.  Patient was given instructions and counseling regarding his condition or for health maintenance advice. Please see specific information pulled into the AVS if appropriate.       "

## 2023-09-18 NOTE — PROGRESS NOTES
Chief Complaint  Hypertension, Hyperlipidemia, Insomnia, and Pain (Neuropathic)    Subjective          Review of Systems   Constitutional:  Negative for activity change, appetite change and unexpected weight change.   HENT:  Positive for hearing loss, sinus pressure and tinnitus. Negative for dental problem, drooling, ear discharge, facial swelling, mouth sores, nosebleeds, postnasal drip, rhinorrhea, trouble swallowing and voice change.         Saw Dr. Mcclure, recommends having sinus surgery.    Eyes:  Positive for itching. Negative for photophobia, pain, discharge, redness and visual disturbance.        Wears glasses     Eye exams with Dr. Glaser   Respiratory:  Negative for apnea, chest tightness and stridor.    Cardiovascular:  Negative for palpitations and leg swelling.        Past Neg Cardiac work up with Dr. Goode   Gastrointestinal:  Positive for heartburn. Negative for abdominal distention, anal bleeding, blood in stool and rectal pain.        Rectal stenosis    Feels like strained R groin lifting heavy roll of wire.    Endocrine: Negative for cold intolerance, heat intolerance, polydipsia, polyphagia and polyuria.   Genitourinary:  Positive for enuresis. Negative for decreased urine volume, difficulty urinating, genital sores and penile swelling.        Seeing Dr. Hansen recommends having surgery for urinary incontinence.    Small R hernia felt in scrotum at times, told OK    Musculoskeletal:  Positive for back pain. Negative for gait problem and neck stiffness.        R foot, Plantar faciitis.  R calf pain.     L5 DDD  Occasional back pain more on R       Skin:  Negative for color change, pallor and wound.        Eczema   Allergic/Immunologic: Positive for environmental allergies. Negative for food allergies and immunocompromised state.   Neurological:  Positive for tremors and headaches. Negative for dizziness, seizures, syncope, facial asymmetry, speech difficulty and light-headedness.   Hematological:   Negative for adenopathy. Bruises/bleeds easily.   Psychiatric/Behavioral:  Positive for sleep disturbance. Negative for agitation, behavioral problems, confusion, decreased concentration, dysphoric mood, hallucinations, self-injury and suicidal ideas. The patient has insomnia. The patient is not hyperactive.         Neurotin helps with pain and sleep,      Sleeps 5 hrs with med.      Guevara Meza presents to [facility] [place]  Hypertension  This is a chronic problem. The current episode started more than 1 year ago. The problem is controlled. Associated symptoms include headaches. Pertinent negatives include no palpitations. Agents associated with hypertension include decongestants and NSAIDs. Risk factors for coronary artery disease include male gender and dyslipidemia. Past treatments include ACE inhibitors. Current antihypertension treatment includes ACE inhibitors. The current treatment provides significant improvement.   Hyperlipidemia  This is a chronic problem. The current episode started more than 1 year ago. Recent lipid tests were reviewed and are variable. Associated symptoms include leg pain. The current treatment provides moderate improvement of lipids.   Insomnia  This is a chronic problem. The current episode started more than 1 year ago. The problem occurs daily. The problem has been gradually improving. Associated symptoms include headaches. Treatments tried: OTC sleep aides. Flexeril helps. The treatment provided moderate relief.   Pain  This is a chronic problem. The current episode started more than 1 year ago. The problem occurs daily. The problem has been waxing and waning. Associated symptoms include headaches. Associated symptoms comments: Neuropathic foot and leg pain. The symptoms are aggravated by standing and walking. Treatments tried: shoe inserts, NSAIDs. The treatment provided significant relief.   Leg Pain   There was no injury  mechanism. The pain is present in the right leg and left leg. The pain is at a severity of 6/10. The pain is moderate. Associated symptoms comments: Cramps improving. He has tried rest (Neurontin) for the symptoms. The treatment provided mild relief.   Foot Pain  This is a chronic problem. The current episode started more than 1 year ago. The problem occurs daily. The problem has been waxing and waning. Associated symptoms include headaches. The symptoms are aggravated by standing and walking. Treatments tried: Neurontin. The treatment provided mild relief.   Arm Pain   The incident occurred more than 1 week ago. The incident occurred at home. The injury mechanism was repetitive motion. The pain radiates to the left arm. The pain is at a severity of 4/10. The pain is moderate. The pain has been Improving since the incident. Treatments tried: Neurontin, NSAIDs.   Allergies  This is a chronic problem. The current episode started more than 1 year ago. The problem has been waxing and waning. Associated symptoms include headaches. The symptoms are aggravated by coughing. Treatments tried: Antihistamines. The treatment provided mild relief.   Heartburn  He complains of heartburn. This is a recurrent problem. The current episode started 1 to 4 weeks ago. The problem has been gradually worsening. The heartburn is of moderate intensity. The symptoms are aggravated by certain foods. He has tried a PPI and a histamine-2 antagonist for the symptoms. The treatment provided mild relief.   Erectile Dysfunction  This is a chronic problem. The problem has been gradually improving since onset. Past treatments include injection treatment. The treatment provided significant relief. Risk factors include prostate surgery.   Sinus Problem  This is a chronic problem. The current episode started more than 1 year ago. The problem has been waxing and waning since onset. Associated symptoms include headaches and sinus pressure. Treatments  "tried: ENT Consult. The treatment provided no relief.     Objective   Vital Signs:   /80 (BP Location: Right arm, Patient Position: Sitting, Cuff Size: Adult)   Pulse 60   Temp 97.3 °F (36.3 °C) (Temporal)   Ht 182.9 cm (72\")   Wt 92.9 kg (204 lb 11.2 oz)   SpO2 99%   BMI 27.76 kg/m²     Physical Exam  Vitals and nursing note reviewed.   Constitutional:       General: He is not in acute distress.     Appearance: Normal appearance. He is well-developed. He is not ill-appearing or diaphoretic.   HENT:      Head: Normocephalic.      Comments: Hearing aides     Right Ear: Tympanic membrane, ear canal and external ear normal. There is no impacted cerumen.      Left Ear: Tympanic membrane, ear canal and external ear normal. There is no impacted cerumen.      Nose: No congestion or rhinorrhea.      Mouth/Throat:      Mouth: Mucous membranes are moist.   Eyes:      General: No scleral icterus.        Right eye: No discharge.         Left eye: No discharge.      Conjunctiva/sclera: Conjunctivae normal.      Pupils: Pupils are equal, round, and reactive to light.   Neck:      Thyroid: No thyromegaly.      Vascular: No JVD.      Trachea: No tracheal deviation.   Cardiovascular:      Rate and Rhythm: Normal rate.      Heart sounds: Normal heart sounds. No murmur heard.    No friction rub. No gallop.   Pulmonary:      Effort: Pulmonary effort is normal. No respiratory distress.      Breath sounds: Normal breath sounds. No stridor. No wheezing, rhonchi or rales.   Chest:      Chest wall: No tenderness.   Abdominal:      General: Bowel sounds are normal. There is no distension.      Palpations: There is no mass.      Tenderness: There is no abdominal tenderness. There is no right CVA tenderness, left CVA tenderness, guarding or rebound.      Hernia: No hernia is present.   Musculoskeletal:         General: No tenderness or deformity.   Lymphadenopathy:      Cervical: No cervical adenopathy.   Skin:     General: Skin is " warm and dry.      Capillary Refill: Capillary refill takes 2 to 3 seconds.      Coloration: Skin is not jaundiced or pale.      Findings: No bruising, erythema, lesion or rash.   Neurological:      General: No focal deficit present.      Mental Status: He is alert and oriented to person, place, and time.      Cranial Nerves: No cranial nerve deficit.      Motor: No weakness or abnormal muscle tone.      Coordination: Coordination normal.      Gait: Gait normal.      Deep Tendon Reflexes: Reflexes are normal and symmetric. Reflexes normal.   Psychiatric:         Mood and Affect: Mood normal.         Behavior: Behavior normal.         Thought Content: Thought content normal.         Judgment: Judgment normal.      Result Review :                 Assessment and Plan    Diagnoses and all orders for this visit:    1. Neuropathic pain  Comments:  Stable with meds  Orders:  -     gabapentin (NEURONTIN) 300 MG capsule; Take 1 capsule by mouth 3 (Three) Times a Day.  Dispense: 90 capsule; Refill: 3    2. Bilateral leg and foot pain  Comments:  Stable with meds  Orders:  -     gabapentin (NEURONTIN) 300 MG capsule; Take 1 capsule by mouth 3 (Three) Times a Day.  Dispense: 90 capsule; Refill: 3    3. Hyperlipidemia, unspecified hyperlipidemia type  -     Lipid Panel; Future    4. Essential hypertension  -     CBC & Differential; Future  -     Comprehensive metabolic panel; Future  -     TSH Rfx On Abnormal To Free T4; Future  -     Urinalysis With Culture If Indicated -; Future  -     Lipid Panel; Future        Follow Up   Return in about 4 months (around 1/18/2024).  Patient was given instructions and counseling regarding his condition or for health maintenance advice. Please see specific information pulled into the AVS if appropriate.         This document has been electronically signed by Rd Pabon MD on September 18, 2023 14:44 CDT

## 2023-09-20 RX ORDER — RAMIPRIL 10 MG/1
10 CAPSULE ORAL DAILY
Qty: 30 CAPSULE | Refills: 1 | Status: SHIPPED | OUTPATIENT
Start: 2023-09-20

## 2023-09-21 RX ORDER — IPRATROPIUM BROMIDE 42 UG/1
SPRAY, METERED NASAL
Qty: 15 ML | Refills: 3 | Status: SHIPPED | OUTPATIENT
Start: 2023-09-21

## 2024-02-07 NOTE — PROGRESS NOTES
Subjective    Mr. Meza is 63 y.o. male    Chief Complaint: urinary incontinence     History of Present Illness    63 yr old male new patient referred by Nell Mercado history of prostate CA.  Underwent RALP 2019 by Dr. Rodriguez at [facility].  Since procedure has battled erectile dysfunction and stress urinary incontinence requiring pad usage.  Erections maintained with Trimix penile injection.  Requiring 1-3 pads per day.        2014: TRUS (OUTSIDE) - benign  09/13/16: TRUS (OUTSIDE) - benign  10/26/17: TRUS (OUTSIDE) - benign; had to be performed in the OR due to rectal stenosis  09/18/18: TRUS: 32cc prostate  - Reema 3+3=6 in 3 cores at the right with 7% - 35% involvement  2/12/19: RALP, BPLND, [place] 3+4, pT2N0. 0/6 LN, negative margins.    The following portions of the patient's history were reviewed and updated as appropriate: allergies, current medications, past family history, past medical history, past social history, past surgical history and problem list.    Review of Systems   Constitutional:  Negative for chills and fever.   Gastrointestinal:  Negative for abdominal pain, anal bleeding, blood in stool, nausea and vomiting.   Genitourinary:  Negative for decreased urine volume, difficulty urinating, dysuria, flank pain, frequency, hematuria and urgency.         Current Outpatient Medications:     albuterol sulfate  (90 Base) MCG/ACT inhaler, Inhale 2 puffs Every 4 (Four) Hours As Needed for Wheezing or Shortness of Air., Disp: 18 g, Rfl: 1    aspirin 81 MG tablet, Take 1 tablet by mouth Daily., Disp: , Rfl:     azelastine (ASTELIN) 0.1 % nasal spray, 2 sprays into the nostril(s) as directed by provider 2 (Two) Times a Day. Use in each nostril as directed, Disp: 30 mL, Rfl: 2    cetirizine (zyrTEC) 10 MG tablet, Take 1 tablet by mouth Daily., Disp: 90 tablet, Rfl: 0    clobetasol (TEMOVATE) 0.05 % external solution, Apply to the affected scalp area by topical route 2 times per day in the morning  and evening as needed--gets from dermatology, Disp: 25 mL, Rfl: 0    Cyanocobalamin (VITAMIN B12 PO), Take  by mouth., Disp: , Rfl:     diclofenac sodium (VOTAREN XR) 100 MG 24 hr tablet, TAKE 1 TABLET EVERY DAY BY ORAL ROUTE AS DIRECTED FOR 30 DAYS., Disp: , Rfl:     ezetimibe (ZETIA) 10 MG tablet, Take 1 tablet by mouth Daily., Disp: , Rfl:     gabapentin (NEURONTIN) 300 MG capsule, Take 1 capsule by mouth 3 (Three) Times a Day., Disp: 90 capsule, Rfl: 3    ipratropium (ATROVENT) 0.06 % nasal spray, SPRAY 2 SPRAYS 3 TIMES A DAY BY INTRANASAL ROUTE AS NEEDED., Disp: 15 mL, Rfl: 3    montelukast (Singulair) 10 MG tablet, Take 1 tablet by mouth Every Night., Disp: 90 tablet, Rfl: 0    Multiple Vitamin (MULTI VITAMIN DAILY PO), Take  by mouth., Disp: , Rfl:     omeprazole (priLOSEC) 40 MG capsule, Take 1 capsule by mouth Daily., Disp: 90 capsule, Rfl: 0    ramipril (ALTACE) 10 MG capsule, TAKE 1 CAPSULE BY MOUTH DAILY., Disp: 30 capsule, Rfl: 1    sucralfate (Carafate) 1 g tablet, Take 1 tablet by mouth 4 (Four) Times a Day As Needed (Stomach pain). Stomach pain, Disp: 120 tablet, Rfl: 3    triamcinolone (KENALOG) 0.1 % cream, APPLY A THIN LAYER TO THE AFFECTED AREA(S) ON LOWER LEGS BY TOPICAL ROUTE 2 TIMES PER DAY FOR ONE WEEK AS NEEDED, Disp: , Rfl:     cyclobenzaprine (FLEXERIL) 10 MG tablet, Take 1 tablet by mouth At Night As Needed for Muscle Spasms. (Patient not taking: Reported on 2/15/2024), Disp: 90 tablet, Rfl: 2    tadalafil (CIALIS) 20 MG tablet, Take 1 tablet by mouth Daily As Needed for Erectile Dysfunction. (Patient not taking: Reported on 2/15/2024), Disp: 30 tablet, Rfl: 3    Past Medical History:   Diagnosis Date    Allergic     Cancer     prostate    History of inguinal hernia     Hyperlipidemia     Hypertension     Shoulder dislocation        Past Surgical History:   Procedure Laterality Date    CHOLECYSTECTOMY      COLONOSCOPY      PROSTATE SURGERY      ROTATOR CUFF REPAIR Right        Social  "History     Socioeconomic History    Marital status:    Tobacco Use    Smoking status: Never    Smokeless tobacco: Never   Substance and Sexual Activity    Alcohol use: No    Drug use: No    Sexual activity: Not Currently     Comment: S/P Prostatectomy       Family History   Problem Relation Age of Onset    Stroke Mother     No Known Problems Father        Objective    Temp 97.6 °F (36.4 °C)   Ht 182.9 cm (72\")   Wt 92.8 kg (204 lb 9.6 oz)   BMI 27.75 kg/m²     Physical Exam  Constitutional:       Appearance: Normal appearance.   Abdominal:      Tenderness: There is no right CVA tenderness or left CVA tenderness.   Skin:     General: Skin is warm and dry.   Neurological:      Mental Status: He is alert and oriented to person, place, and time.   Psychiatric:         Mood and Affect: Mood normal.         Behavior: Behavior normal.             Results for orders placed or performed in visit on 02/15/24   POC Urinalysis Dipstick, Multipro    Specimen: Urine   Result Value Ref Range    Color Yellow Yellow, Straw, Dark Yellow, Michelle    Clarity, UA Clear Clear    Glucose, UA Negative Negative mg/dL    Bilirubin Negative Negative    Ketones, UA Negative Negative    Specific Gravity  1.020 1.005 - 1.030    Blood, UA Trace (A) Negative    pH, Urine 6.0 5.0 - 8.0    Protein, POC Negative Negative mg/dL    Urobilinogen, UA 0.2 E.U./dL Normal, 0.2 E.U./dL    Nitrite, UA Negative Negative    Leukocytes Negative Negative   Estimation of residual urine via abdominal ultrasound  Residual Urine: 53 ml  Indication: incontinence  Position: Supine  Examination: Incremental scanning of the suprapubic area using 3 MHz transducer using copious amounts of acoustic gel.   Findings: An anechoic area was demonstrated which represented the bladder, with measurement of residual urine as noted. I inspected this myself. In that the residual urine was stable or insignificant, no treatment will be necessary at this time.      Assessment " and Plan    Diagnoses and all orders for this visit:    1. Urinary incontinence, unspecified type (Primary)  -     POC Urinalysis Dipstick, Multipro      Have provided patient information regarding the different options for male stress incontinence including male sling versus artificial sphincter have provided a brochure.  Have also discussed penile implant.  Will get patient scheduled with Dr. Bruner for consultation.    Patient to continue Trimix penile injections prescribed through PCP

## 2024-02-15 ENCOUNTER — OFFICE VISIT (OUTPATIENT)
Dept: UROLOGY | Facility: CLINIC | Age: 64
End: 2024-02-15
Payer: MEDICARE

## 2024-02-15 VITALS — TEMPERATURE: 97.6 F | BODY MASS INDEX: 27.71 KG/M2 | HEIGHT: 72 IN | WEIGHT: 204.6 LBS

## 2024-02-15 DIAGNOSIS — R32 URINARY INCONTINENCE, UNSPECIFIED TYPE: Primary | ICD-10-CM

## 2024-02-15 LAB
BILIRUB BLD-MCNC: NEGATIVE MG/DL
CLARITY, POC: CLEAR
COLOR UR: YELLOW
GLUCOSE UR STRIP-MCNC: NEGATIVE MG/DL
KETONES UR QL: NEGATIVE
LEUKOCYTE EST, POC: NEGATIVE
NITRITE UR-MCNC: NEGATIVE MG/ML
PH UR: 6 [PH] (ref 5–8)
PROT UR STRIP-MCNC: NEGATIVE MG/DL
RBC # UR STRIP: ABNORMAL /UL
SP GR UR: 1.02 (ref 1–1.03)
UROBILINOGEN UR QL: ABNORMAL

## 2024-02-15 RX ORDER — EZETIMIBE 10 MG/1
10 TABLET ORAL DAILY
COMMUNITY
Start: 2024-01-24 | End: 2024-07-23

## 2024-08-06 ENCOUNTER — TELEPHONE (OUTPATIENT)
Dept: UROLOGY | Facility: CLINIC | Age: 64
End: 2024-08-06
Payer: MEDICARE

## 2024-08-06 NOTE — TELEPHONE ENCOUNTER
----- Message from Melania GALVAN sent at 8/6/2024  8:09 AM CDT -----  Regarding: FW: please change this appt to a FLEX CYSTO    ----- Message -----  From: Trev Bruner MD  Sent: 8/5/2024   6:35 PM CDT  To: Melania Fierro CMA  Subject: please change this appt to a FLEX CYSTO          Thank you

## 2024-08-06 NOTE — TELEPHONE ENCOUNTER
Called patient and spoke to him about changing his upcoming appointment with Dr. Bruner to a cysto appointment.   Patient needed me to explain what a cystoscopy was, which I briefed him on.  He wanted an appointment that was late in the morning.  My next late morning wasn't until 8/28/24 at 10:00am.  Patient asked that he could call our office back after looking at this schedule.  I told him I would cancel off the appointment for 8/14/24 until I hear from him.  Patient verbally agreed to plan.    Hub is okay to transfer call to  if patient calls back.   please reschedule patient's appointment with Dr. Bruner from 8/14/24 to a flex cysto appointment to discuss IPP and male sling.